# Patient Record
Sex: MALE | Race: WHITE | NOT HISPANIC OR LATINO | URBAN - METROPOLITAN AREA
[De-identification: names, ages, dates, MRNs, and addresses within clinical notes are randomized per-mention and may not be internally consistent; named-entity substitution may affect disease eponyms.]

---

## 2022-08-21 ENCOUNTER — INPATIENT HOSPITAL (OUTPATIENT)
Dept: URBAN - METROPOLITAN AREA HOSPITAL 56 | Facility: HOSPITAL | Age: 62
End: 2022-08-21

## 2022-08-21 DIAGNOSIS — K44.9 DIAPHRAGMATIC HERNIA WITHOUT OBSTRUCTION OR GANGRENE: ICD-10-CM

## 2022-08-21 DIAGNOSIS — E11.22 TYPE 2 DIABETES MELLITUS WITH DIABETIC CHRONIC KIDNEY DISEAS: ICD-10-CM

## 2022-08-21 DIAGNOSIS — K29.90 GASTRODUODENITIS, UNSPECIFIED, WITHOUT BLEEDING: ICD-10-CM

## 2022-08-21 DIAGNOSIS — K25.9 GASTRIC ULCER, UNSPECIFIED AS ACUTE OR CHRONIC, WITHOUT HEMO: ICD-10-CM

## 2022-08-21 DIAGNOSIS — N18.4 CHRONIC KIDNEY DISEASE, STAGE 4 (SEVERE): ICD-10-CM

## 2022-08-21 DIAGNOSIS — K92.2 GASTROINTESTINAL HEMORRHAGE, UNSPECIFIED: ICD-10-CM

## 2022-08-21 DIAGNOSIS — R71.0 PRECIPITOUS DROP IN HEMATOCRIT: ICD-10-CM

## 2022-08-21 PROCEDURE — 43235 EGD DIAGNOSTIC BRUSH WASH: CPT | Performed by: INTERNAL MEDICINE

## 2022-08-21 PROCEDURE — 99254 IP/OBS CNSLTJ NEW/EST MOD 60: CPT | Mod: 25

## 2022-08-22 ENCOUNTER — INPATIENT HOSPITAL (OUTPATIENT)
Dept: URBAN - METROPOLITAN AREA HOSPITAL 56 | Facility: HOSPITAL | Age: 62
End: 2022-08-22

## 2022-08-22 DIAGNOSIS — K25.9 GASTRIC ULCER, UNSPECIFIED AS ACUTE OR CHRONIC, WITHOUT HEMO: ICD-10-CM

## 2022-08-22 DIAGNOSIS — K29.90 GASTRODUODENITIS, UNSPECIFIED, WITHOUT BLEEDING: ICD-10-CM

## 2022-08-22 DIAGNOSIS — D53.8 OTHER SPECIFIED NUTRITIONAL ANEMIAS: ICD-10-CM

## 2022-08-22 DIAGNOSIS — K44.9 DIAPHRAGMATIC HERNIA WITHOUT OBSTRUCTION OR GANGRENE: ICD-10-CM

## 2022-08-22 PROCEDURE — 99232 SBSQ HOSP IP/OBS MODERATE 35: CPT

## 2022-08-23 PROCEDURE — 99232 SBSQ HOSP IP/OBS MODERATE 35: CPT

## 2023-04-15 ENCOUNTER — INPATIENT HOSPITAL (OUTPATIENT)
Dept: URBAN - METROPOLITAN AREA HOSPITAL 56 | Facility: HOSPITAL | Age: 63
End: 2023-04-15
Payer: COMMERCIAL

## 2023-04-15 DIAGNOSIS — K92.1 MELENA: ICD-10-CM

## 2023-04-15 DIAGNOSIS — D50.0 IRON DEFICIENCY ANEMIA SECONDARY TO BLOOD LOSS (CHRONIC): ICD-10-CM

## 2023-04-15 PROCEDURE — 99255 IP/OBS CONSLTJ NEW/EST HI 80: CPT

## 2023-04-16 ENCOUNTER — INPATIENT HOSPITAL (OUTPATIENT)
Dept: URBAN - METROPOLITAN AREA HOSPITAL 56 | Facility: HOSPITAL | Age: 63
End: 2023-04-16
Payer: COMMERCIAL

## 2023-04-16 DIAGNOSIS — K92.1 MELENA: ICD-10-CM

## 2023-04-16 DIAGNOSIS — K26.4 CHRONIC OR UNSPECIFIED DUODENAL ULCER WITH HEMORRHAGE: ICD-10-CM

## 2023-04-16 PROCEDURE — 43255 EGD CONTROL BLEEDING ANY: CPT | Performed by: INTERNAL MEDICINE

## 2023-04-17 ENCOUNTER — INPATIENT HOSPITAL (OUTPATIENT)
Dept: URBAN - METROPOLITAN AREA HOSPITAL 56 | Facility: HOSPITAL | Age: 63
End: 2023-04-17
Payer: COMMERCIAL

## 2023-04-17 DIAGNOSIS — D50.0 IRON DEFICIENCY ANEMIA SECONDARY TO BLOOD LOSS (CHRONIC): ICD-10-CM

## 2023-04-17 DIAGNOSIS — K26.4 CHRONIC OR UNSPECIFIED DUODENAL ULCER WITH HEMORRHAGE: ICD-10-CM

## 2023-04-17 PROCEDURE — 99233 SBSQ HOSP IP/OBS HIGH 50: CPT | Performed by: PHYSICIAN ASSISTANT

## 2023-04-18 PROCEDURE — 99232 SBSQ HOSP IP/OBS MODERATE 35: CPT | Performed by: PHYSICIAN ASSISTANT

## 2023-04-22 ENCOUNTER — INPATIENT HOSPITAL (OUTPATIENT)
Dept: URBAN - METROPOLITAN AREA HOSPITAL 56 | Facility: HOSPITAL | Age: 63
End: 2023-04-22
Payer: COMMERCIAL

## 2023-04-22 DIAGNOSIS — K92.1 MELENA: ICD-10-CM

## 2023-04-22 DIAGNOSIS — K26.4 CHRONIC OR UNSPECIFIED DUODENAL ULCER WITH HEMORRHAGE: ICD-10-CM

## 2023-04-22 PROCEDURE — 43255 EGD CONTROL BLEEDING ANY: CPT | Performed by: INTERNAL MEDICINE

## 2023-04-23 ENCOUNTER — INPATIENT HOSPITAL (OUTPATIENT)
Dept: URBAN - METROPOLITAN AREA HOSPITAL 56 | Facility: HOSPITAL | Age: 63
End: 2023-04-23
Payer: COMMERCIAL

## 2023-04-23 DIAGNOSIS — K26.4 CHRONIC OR UNSPECIFIED DUODENAL ULCER WITH HEMORRHAGE: ICD-10-CM

## 2023-04-23 DIAGNOSIS — D50.0 IRON DEFICIENCY ANEMIA SECONDARY TO BLOOD LOSS (CHRONIC): ICD-10-CM

## 2023-04-23 PROCEDURE — 99232 SBSQ HOSP IP/OBS MODERATE 35: CPT

## 2023-04-24 ENCOUNTER — INPATIENT HOSPITAL (OUTPATIENT)
Dept: URBAN - METROPOLITAN AREA HOSPITAL 104 | Facility: HOSPITAL | Age: 63
End: 2023-04-24
Payer: COMMERCIAL

## 2023-04-24 DIAGNOSIS — D50.0 IRON DEFICIENCY ANEMIA SECONDARY TO BLOOD LOSS (CHRONIC): ICD-10-CM

## 2023-04-24 DIAGNOSIS — K26.4 CHRONIC OR UNSPECIFIED DUODENAL ULCER WITH HEMORRHAGE: ICD-10-CM

## 2023-04-24 DIAGNOSIS — R57.1 HYPOVOLEMIC SHOCK: ICD-10-CM

## 2023-04-24 PROCEDURE — 99233 SBSQ HOSP IP/OBS HIGH 50: CPT | Performed by: NURSE PRACTITIONER

## 2023-04-25 PROCEDURE — 99232 SBSQ HOSP IP/OBS MODERATE 35: CPT | Performed by: NURSE PRACTITIONER

## 2023-04-26 PROCEDURE — 99232 SBSQ HOSP IP/OBS MODERATE 35: CPT | Performed by: NURSE PRACTITIONER

## 2024-06-01 ENCOUNTER — INPATIENT HOSPITAL (OUTPATIENT)
Dept: URBAN - METROPOLITAN AREA HOSPITAL 104 | Facility: HOSPITAL | Age: 64
End: 2024-06-01
Payer: COMMERCIAL

## 2024-06-01 DIAGNOSIS — D50.0 IRON DEFICIENCY ANEMIA SECONDARY TO BLOOD LOSS (CHRONIC): ICD-10-CM

## 2024-06-01 DIAGNOSIS — K92.1 MELENA: ICD-10-CM

## 2024-06-01 PROCEDURE — 99222 1ST HOSP IP/OBS MODERATE 55: CPT | Performed by: INTERNAL MEDICINE

## 2024-06-03 ENCOUNTER — INPATIENT HOSPITAL (OUTPATIENT)
Dept: URBAN - METROPOLITAN AREA HOSPITAL 104 | Facility: HOSPITAL | Age: 64
End: 2024-06-03
Payer: COMMERCIAL

## 2024-06-03 DIAGNOSIS — D50.0 IRON DEFICIENCY ANEMIA SECONDARY TO BLOOD LOSS (CHRONIC): ICD-10-CM

## 2024-06-03 DIAGNOSIS — K92.1 MELENA: ICD-10-CM

## 2024-06-03 PROCEDURE — 99232 SBSQ HOSP IP/OBS MODERATE 35: CPT | Performed by: NURSE PRACTITIONER

## 2024-06-26 ENCOUNTER — INPATIENT HOSPITAL (OUTPATIENT)
Dept: URBAN - METROPOLITAN AREA HOSPITAL 104 | Facility: HOSPITAL | Age: 64
End: 2024-06-26
Payer: COMMERCIAL

## 2024-06-26 DIAGNOSIS — K92.1 MELENA: ICD-10-CM

## 2024-06-26 DIAGNOSIS — D50.0 IRON DEFICIENCY ANEMIA SECONDARY TO BLOOD LOSS (CHRONIC): ICD-10-CM

## 2024-06-26 DIAGNOSIS — K31.811 ANGIODYSPLASIA OF STOMACH AND DUODENUM WITH BLEEDING: ICD-10-CM

## 2024-06-26 DIAGNOSIS — K29.70 GASTRITIS, UNSPECIFIED, WITHOUT BLEEDING: ICD-10-CM

## 2024-06-26 PROCEDURE — 99222 1ST HOSP IP/OBS MODERATE 55: CPT | Mod: 25 | Performed by: NURSE PRACTITIONER

## 2024-06-26 PROCEDURE — 43255 EGD CONTROL BLEEDING ANY: CPT | Performed by: INTERNAL MEDICINE

## 2024-06-27 PROCEDURE — 99232 SBSQ HOSP IP/OBS MODERATE 35: CPT | Performed by: NURSE PRACTITIONER

## 2024-06-28 ENCOUNTER — INPATIENT HOSPITAL (OUTPATIENT)
Dept: URBAN - METROPOLITAN AREA HOSPITAL 104 | Facility: HOSPITAL | Age: 64
End: 2024-06-28
Payer: COMMERCIAL

## 2024-06-28 DIAGNOSIS — K29.70 GASTRITIS, UNSPECIFIED, WITHOUT BLEEDING: ICD-10-CM

## 2024-06-28 DIAGNOSIS — K31.811 ANGIODYSPLASIA OF STOMACH AND DUODENUM WITH BLEEDING: ICD-10-CM

## 2024-06-28 DIAGNOSIS — D50.0 IRON DEFICIENCY ANEMIA SECONDARY TO BLOOD LOSS (CHRONIC): ICD-10-CM

## 2024-06-28 PROCEDURE — 99232 SBSQ HOSP IP/OBS MODERATE 35: CPT | Performed by: NURSE PRACTITIONER

## 2024-07-08 ENCOUNTER — INPATIENT HOSPITAL (OUTPATIENT)
Dept: URBAN - METROPOLITAN AREA HOSPITAL 104 | Facility: HOSPITAL | Age: 64
End: 2024-07-08
Payer: COMMERCIAL

## 2024-07-08 DIAGNOSIS — K56.7 ILEUS, UNSPECIFIED: ICD-10-CM

## 2024-07-08 DIAGNOSIS — D50.0 IRON DEFICIENCY ANEMIA SECONDARY TO BLOOD LOSS (CHRONIC): ICD-10-CM

## 2024-07-08 PROCEDURE — 99232 SBSQ HOSP IP/OBS MODERATE 35: CPT | Performed by: PHYSICIAN ASSISTANT

## 2024-07-09 PROCEDURE — 99232 SBSQ HOSP IP/OBS MODERATE 35: CPT | Performed by: PHYSICIAN ASSISTANT

## 2024-10-25 ENCOUNTER — HOSPITAL ENCOUNTER (INPATIENT)
Age: 64
LOS: 13 days | Discharge: NURSING, REHAB OR LONG-TERM CARE | End: 2024-11-08
Attending: STUDENT IN AN ORGANIZED HEALTH CARE EDUCATION/TRAINING PROGRAM | Admitting: STUDENT IN AN ORGANIZED HEALTH CARE EDUCATION/TRAINING PROGRAM
Payer: MEDICARE

## 2024-10-25 ENCOUNTER — APPOINTMENT (OUTPATIENT)
Dept: CT IMAGING | Age: 64
End: 2024-10-25
Payer: MEDICARE

## 2024-10-25 ENCOUNTER — APPOINTMENT (OUTPATIENT)
Dept: GENERAL RADIOLOGY | Age: 64
End: 2024-10-25
Attending: STUDENT IN AN ORGANIZED HEALTH CARE EDUCATION/TRAINING PROGRAM
Payer: MEDICARE

## 2024-10-25 DIAGNOSIS — N18.6 ESRD (END STAGE RENAL DISEASE) (HCC): ICD-10-CM

## 2024-10-25 DIAGNOSIS — J96.21 ACUTE ON CHRONIC HYPOXIC RESPIRATORY FAILURE: Primary | ICD-10-CM

## 2024-10-25 LAB
ALBUMIN SERPL-MCNC: 2.5 G/DL (ref 3.4–5)
ALBUMIN/GLOB SERPL: 0.6 {RATIO} (ref 1.1–2.2)
ALP SERPL-CCNC: 183 U/L (ref 40–129)
ALT SERPL-CCNC: 11 U/L (ref 10–40)
ANION GAP SERPL CALCULATED.3IONS-SCNC: 7 MMOL/L (ref 9–17)
ARTERIAL PATENCY WRIST A: ABNORMAL
AST SERPL-CCNC: 25 U/L (ref 15–37)
BASOPHILS # BLD: 0.03 K/UL
BASOPHILS NFR BLD: 1 % (ref 0–1)
BILIRUB SERPL-MCNC: 0.5 MG/DL (ref 0–1)
BNP SERPL-MCNC: ABNORMAL PG/ML (ref 0–125)
BODY TEMPERATURE: 37
BUN SERPL-MCNC: 13 MG/DL (ref 7–20)
CALCIUM SERPL-MCNC: 10.6 MG/DL (ref 8.3–10.6)
CHLORIDE SERPL-SCNC: 96 MMOL/L (ref 99–110)
CO2 SERPL-SCNC: 27 MMOL/L (ref 21–32)
COHGB MFR BLD: 0.3 % (ref 0.5–1.5)
CREAT SERPL-MCNC: 1.8 MG/DL (ref 0.8–1.3)
EOSINOPHIL # BLD: 0.2 K/UL
EOSINOPHILS RELATIVE PERCENT: 3 % (ref 0–3)
ERYTHROCYTE [DISTWIDTH] IN BLOOD BY AUTOMATED COUNT: 16.2 % (ref 11.7–14.9)
GFR, ESTIMATED: 37 ML/MIN/1.73M2
GLUCOSE SERPL-MCNC: 85 MG/DL (ref 74–99)
HCO3 VENOUS: 29.2 MMOL/L (ref 22–29)
HCT VFR BLD AUTO: 27.4 % (ref 42–52)
HGB BLD-MCNC: 8.3 G/DL (ref 13.5–18)
IMM GRANULOCYTES # BLD AUTO: 0.02 K/UL
IMM GRANULOCYTES NFR BLD: 0 %
LACTATE BLDV-SCNC: 0.8 MMOL/L (ref 0.4–2)
LYMPHOCYTES NFR BLD: 0.51 K/UL
LYMPHOCYTES RELATIVE PERCENT: 8 % (ref 24–44)
MCH RBC QN AUTO: 28.3 PG (ref 27–31)
MCHC RBC AUTO-ENTMCNC: 30.3 G/DL (ref 32–36)
MCV RBC AUTO: 93.5 FL (ref 78–100)
METHEMOGLOBIN: 0.4 % (ref 0.5–1.5)
MONOCYTES NFR BLD: 0.31 K/UL
MONOCYTES NFR BLD: 5 % (ref 0–4)
NEUTROPHILS NFR BLD: 83 % (ref 36–66)
NEUTS SEG NFR BLD: 5.11 K/UL
OXYHGB MFR BLD: 88.8 %
PCO2 VENOUS: 47.2 MM HG (ref 38–54)
PH VENOUS: 7.41 (ref 7.32–7.43)
PLATELET # BLD AUTO: 128 K/UL (ref 140–440)
PMV BLD AUTO: 8.3 FL (ref 7.5–11.1)
PO2 VENOUS: 57.6 MM HG (ref 23–48)
POSITIVE BASE EXCESS, VEN: 4 MMOL/L (ref 0–3)
POTASSIUM SERPL-SCNC: 4.3 MMOL/L (ref 3.5–5.1)
PROT SERPL-MCNC: 6.9 G/DL (ref 6.4–8.2)
RBC # BLD AUTO: 2.93 M/UL (ref 4.6–6.2)
SODIUM SERPL-SCNC: 131 MMOL/L (ref 136–145)
TROPONIN I SERPL HS-MCNC: 140 NG/L (ref 0–22)
WBC OTHER # BLD: 6.2 K/UL (ref 4–10.5)

## 2024-10-25 PROCEDURE — 82805 BLOOD GASES W/O2 SATURATION: CPT

## 2024-10-25 PROCEDURE — 99285 EMERGENCY DEPT VISIT HI MDM: CPT

## 2024-10-25 PROCEDURE — 83605 ASSAY OF LACTIC ACID: CPT

## 2024-10-25 PROCEDURE — 5A1955Z RESPIRATORY VENTILATION, GREATER THAN 96 CONSECUTIVE HOURS: ICD-10-PCS | Performed by: INTERNAL MEDICINE

## 2024-10-25 PROCEDURE — 2580000003 HC RX 258: Performed by: STUDENT IN AN ORGANIZED HEALTH CARE EDUCATION/TRAINING PROGRAM

## 2024-10-25 PROCEDURE — 83880 ASSAY OF NATRIURETIC PEPTIDE: CPT

## 2024-10-25 PROCEDURE — 84145 PROCALCITONIN (PCT): CPT

## 2024-10-25 PROCEDURE — 6370000000 HC RX 637 (ALT 250 FOR IP): Performed by: STUDENT IN AN ORGANIZED HEALTH CARE EDUCATION/TRAINING PROGRAM

## 2024-10-25 PROCEDURE — 6360000002 HC RX W HCPCS: Performed by: STUDENT IN AN ORGANIZED HEALTH CARE EDUCATION/TRAINING PROGRAM

## 2024-10-25 PROCEDURE — 93005 ELECTROCARDIOGRAM TRACING: CPT | Performed by: STUDENT IN AN ORGANIZED HEALTH CARE EDUCATION/TRAINING PROGRAM

## 2024-10-25 PROCEDURE — 96367 TX/PROPH/DG ADDL SEQ IV INF: CPT

## 2024-10-25 PROCEDURE — 80053 COMPREHEN METABOLIC PANEL: CPT

## 2024-10-25 PROCEDURE — 94640 AIRWAY INHALATION TREATMENT: CPT

## 2024-10-25 PROCEDURE — 2500000003 HC RX 250 WO HCPCS: Performed by: STUDENT IN AN ORGANIZED HEALTH CARE EDUCATION/TRAINING PROGRAM

## 2024-10-25 PROCEDURE — 87040 BLOOD CULTURE FOR BACTERIA: CPT

## 2024-10-25 PROCEDURE — 71045 X-RAY EXAM CHEST 1 VIEW: CPT

## 2024-10-25 PROCEDURE — 83690 ASSAY OF LIPASE: CPT

## 2024-10-25 PROCEDURE — 85025 COMPLETE CBC W/AUTO DIFF WBC: CPT

## 2024-10-25 PROCEDURE — 0202U NFCT DS 22 TRGT SARS-COV-2: CPT

## 2024-10-25 PROCEDURE — 5A1D70Z PERFORMANCE OF URINARY FILTRATION, INTERMITTENT, LESS THAN 6 HOURS PER DAY: ICD-10-PCS | Performed by: INTERNAL MEDICINE

## 2024-10-25 PROCEDURE — 96365 THER/PROPH/DIAG IV INF INIT: CPT

## 2024-10-25 PROCEDURE — 36415 COLL VENOUS BLD VENIPUNCTURE: CPT

## 2024-10-25 PROCEDURE — 84484 ASSAY OF TROPONIN QUANT: CPT

## 2024-10-25 RX ORDER — IPRATROPIUM BROMIDE AND ALBUTEROL SULFATE 2.5; .5 MG/3ML; MG/3ML
2 SOLUTION RESPIRATORY (INHALATION) ONCE
Status: COMPLETED | OUTPATIENT
Start: 2024-10-25 | End: 2024-10-25

## 2024-10-25 RX ADMIN — CEFEPIME 2000 MG: 2 INJECTION, POWDER, FOR SOLUTION INTRAVENOUS at 23:14

## 2024-10-25 RX ADMIN — DOXYCYCLINE 100 MG: 100 INJECTION, POWDER, LYOPHILIZED, FOR SOLUTION INTRAVENOUS at 23:50

## 2024-10-25 RX ADMIN — IPRATROPIUM BROMIDE AND ALBUTEROL SULFATE 2 DOSE: 2.5; .5 SOLUTION RESPIRATORY (INHALATION) at 23:20

## 2024-10-25 RX ADMIN — VANCOMYCIN HYDROCHLORIDE 2500 MG: 5 INJECTION, POWDER, LYOPHILIZED, FOR SOLUTION INTRAVENOUS at 23:49

## 2024-10-25 ASSESSMENT — PAIN - FUNCTIONAL ASSESSMENT: PAIN_FUNCTIONAL_ASSESSMENT: NONE - DENIES PAIN

## 2024-10-26 ENCOUNTER — APPOINTMENT (OUTPATIENT)
Dept: CT IMAGING | Age: 64
End: 2024-10-26
Payer: MEDICARE

## 2024-10-26 PROBLEM — J96.20 ACUTE ON CHRONIC RESPIRATORY FAILURE: Status: ACTIVE | Noted: 2024-10-26

## 2024-10-26 LAB
ALBUMIN SERPL-MCNC: 2.2 G/DL (ref 3.4–5)
ALBUMIN/GLOB SERPL: 0.5 {RATIO} (ref 1.1–2.2)
ALP SERPL-CCNC: 147 U/L (ref 40–129)
ALT SERPL-CCNC: 7 U/L (ref 10–40)
ANION GAP SERPL CALCULATED.3IONS-SCNC: 6 MMOL/L (ref 9–17)
AST SERPL-CCNC: 21 U/L (ref 15–37)
B PARAP IS1001 DNA NPH QL NAA+NON-PROBE: NOT DETECTED
B PERT DNA SPEC QL NAA+PROBE: NOT DETECTED
BILIRUB SERPL-MCNC: 0.4 MG/DL (ref 0–1)
BUN SERPL-MCNC: 17 MG/DL (ref 7–20)
C PNEUM DNA NPH QL NAA+NON-PROBE: NOT DETECTED
CALCIUM SERPL-MCNC: 10.4 MG/DL (ref 8.3–10.6)
CHLORIDE SERPL-SCNC: 99 MMOL/L (ref 99–110)
CO2 SERPL-SCNC: 27 MMOL/L (ref 21–32)
CREAT SERPL-MCNC: 2.1 MG/DL (ref 0.8–1.3)
EKG ATRIAL RATE: 94 BPM
EKG DIAGNOSIS: NORMAL
EKG P AXIS: 62 DEGREES
EKG P-R INTERVAL: 192 MS
EKG Q-T INTERVAL: 394 MS
EKG QRS DURATION: 144 MS
EKG QTC CALCULATION (BAZETT): 492 MS
EKG R AXIS: -78 DEGREES
EKG T AXIS: 44 DEGREES
EKG VENTRICULAR RATE: 94 BPM
ERYTHROCYTE [DISTWIDTH] IN BLOOD BY AUTOMATED COUNT: 16.5 % (ref 11.7–14.9)
FLUAV RNA NPH QL NAA+NON-PROBE: NOT DETECTED
FLUBV RNA NPH QL NAA+NON-PROBE: NOT DETECTED
GFR, ESTIMATED: 32 ML/MIN/1.73M2
GLUCOSE SERPL-MCNC: 54 MG/DL (ref 74–99)
HADV DNA NPH QL NAA+NON-PROBE: NOT DETECTED
HCOV 229E RNA NPH QL NAA+NON-PROBE: NOT DETECTED
HCOV HKU1 RNA NPH QL NAA+NON-PROBE: NOT DETECTED
HCOV NL63 RNA NPH QL NAA+NON-PROBE: NOT DETECTED
HCOV OC43 RNA NPH QL NAA+NON-PROBE: NOT DETECTED
HCT VFR BLD AUTO: 23.8 % (ref 42–52)
HGB BLD-MCNC: 7.1 G/DL (ref 13.5–18)
HMPV RNA NPH QL NAA+NON-PROBE: NOT DETECTED
HPIV1 RNA NPH QL NAA+NON-PROBE: NOT DETECTED
HPIV2 RNA NPH QL NAA+NON-PROBE: NOT DETECTED
HPIV3 RNA NPH QL NAA+NON-PROBE: NOT DETECTED
HPIV4 RNA NPH QL NAA+NON-PROBE: NOT DETECTED
LIPASE SERPL-CCNC: 15 U/L (ref 13–60)
M PNEUMO DNA NPH QL NAA+NON-PROBE: NOT DETECTED
MCH RBC QN AUTO: 28 PG (ref 27–31)
MCHC RBC AUTO-ENTMCNC: 29.8 G/DL (ref 32–36)
MCV RBC AUTO: 93.7 FL (ref 78–100)
MRSA, DNA, NASAL: NOT DETECTED
PLATELET # BLD AUTO: 127 K/UL (ref 140–440)
PMV BLD AUTO: 8.8 FL (ref 7.5–11.1)
POTASSIUM SERPL-SCNC: 4.4 MMOL/L (ref 3.5–5.1)
PROCALCITONIN SERPL-MCNC: 2 NG/ML
PROT SERPL-MCNC: 6.4 G/DL (ref 6.4–8.2)
RBC # BLD AUTO: 2.54 M/UL (ref 4.6–6.2)
RSV RNA NPH QL NAA+NON-PROBE: NOT DETECTED
RV+EV RNA NPH QL NAA+NON-PROBE: NOT DETECTED
SARS-COV-2 RNA NPH QL NAA+NON-PROBE: NOT DETECTED
SODIUM SERPL-SCNC: 132 MMOL/L (ref 136–145)
SPECIMEN DESCRIPTION: NORMAL
SPECIMEN DESCRIPTION: NORMAL
TROPONIN I SERPL HS-MCNC: 150 NG/L (ref 0–22)
WBC OTHER # BLD: 4.9 K/UL (ref 4–10.5)

## 2024-10-26 PROCEDURE — 87205 SMEAR GRAM STAIN: CPT

## 2024-10-26 PROCEDURE — 96368 THER/DIAG CONCURRENT INF: CPT

## 2024-10-26 PROCEDURE — 6370000000 HC RX 637 (ALT 250 FOR IP): Performed by: STUDENT IN AN ORGANIZED HEALTH CARE EDUCATION/TRAINING PROGRAM

## 2024-10-26 PROCEDURE — 94640 AIRWAY INHALATION TREATMENT: CPT

## 2024-10-26 PROCEDURE — 6360000002 HC RX W HCPCS: Performed by: STUDENT IN AN ORGANIZED HEALTH CARE EDUCATION/TRAINING PROGRAM

## 2024-10-26 PROCEDURE — 87070 CULTURE OTHR SPECIMN AEROBIC: CPT

## 2024-10-26 PROCEDURE — 89220 SPUTUM SPECIMEN COLLECTION: CPT

## 2024-10-26 PROCEDURE — 94002 VENT MGMT INPAT INIT DAY: CPT

## 2024-10-26 PROCEDURE — 74176 CT ABD & PELVIS W/O CONTRAST: CPT

## 2024-10-26 PROCEDURE — 85027 COMPLETE CBC AUTOMATED: CPT

## 2024-10-26 PROCEDURE — 87077 CULTURE AEROBIC IDENTIFY: CPT

## 2024-10-26 PROCEDURE — 87641 MR-STAPH DNA AMP PROBE: CPT

## 2024-10-26 PROCEDURE — 80053 COMPREHEN METABOLIC PANEL: CPT

## 2024-10-26 PROCEDURE — 94761 N-INVAS EAR/PLS OXIMETRY MLT: CPT

## 2024-10-26 PROCEDURE — 2580000003 HC RX 258: Performed by: STUDENT IN AN ORGANIZED HEALTH CARE EDUCATION/TRAINING PROGRAM

## 2024-10-26 PROCEDURE — 2000000000 HC ICU R&B

## 2024-10-26 PROCEDURE — 2700000000 HC OXYGEN THERAPY PER DAY

## 2024-10-26 PROCEDURE — 93010 ELECTROCARDIOGRAM REPORT: CPT | Performed by: INTERNAL MEDICINE

## 2024-10-26 PROCEDURE — 87186 SC STD MICRODIL/AGAR DIL: CPT

## 2024-10-26 PROCEDURE — 96366 THER/PROPH/DIAG IV INF ADDON: CPT

## 2024-10-26 PROCEDURE — 84484 ASSAY OF TROPONIN QUANT: CPT

## 2024-10-26 RX ORDER — SUCRALFATE ORAL 1 G/10ML
1 SUSPENSION ORAL 3 TIMES DAILY
COMMUNITY

## 2024-10-26 RX ORDER — ONDANSETRON 8 MG/1
8 TABLET, ORALLY DISINTEGRATING ORAL EVERY 8 HOURS PRN
COMMUNITY

## 2024-10-26 RX ORDER — ACETAMINOPHEN 500 MG
1000 TABLET ORAL EVERY 8 HOURS PRN
COMMUNITY
Start: 2024-08-06

## 2024-10-26 RX ORDER — ATORVASTATIN CALCIUM 40 MG/1
40 TABLET, FILM COATED ORAL NIGHTLY
Status: DISCONTINUED | OUTPATIENT
Start: 2024-10-26 | End: 2024-11-08 | Stop reason: HOSPADM

## 2024-10-26 RX ORDER — LOPERAMIDE HYDROCHLORIDE 2 MG/1
2 CAPSULE ORAL PRN
COMMUNITY
Start: 2024-08-06

## 2024-10-26 RX ORDER — TRAZODONE HYDROCHLORIDE 50 MG/1
50 TABLET, FILM COATED ORAL NIGHTLY
Status: DISCONTINUED | OUTPATIENT
Start: 2024-10-26 | End: 2024-11-08 | Stop reason: HOSPADM

## 2024-10-26 RX ORDER — LANOLIN ALCOHOL/MO/W.PET/CERES
1 CREAM (GRAM) TOPICAL NIGHTLY PRN
COMMUNITY
Start: 2024-06-24

## 2024-10-26 RX ORDER — SODIUM CHLORIDE FOR INHALATION 3 %
4 VIAL, NEBULIZER (ML) INHALATION PRN
Status: DISCONTINUED | OUTPATIENT
Start: 2024-10-26 | End: 2024-11-08 | Stop reason: HOSPADM

## 2024-10-26 RX ORDER — HEPARIN SODIUM 5000 [USP'U]/ML
5000 INJECTION, SOLUTION INTRAVENOUS; SUBCUTANEOUS EVERY 8 HOURS SCHEDULED
Status: DISCONTINUED | OUTPATIENT
Start: 2024-10-26 | End: 2024-11-08 | Stop reason: HOSPADM

## 2024-10-26 RX ORDER — GABAPENTIN 100 MG/1
100 CAPSULE ORAL DAILY
COMMUNITY

## 2024-10-26 RX ORDER — TRAZODONE HYDROCHLORIDE 50 MG/1
1 TABLET, FILM COATED ORAL NIGHTLY
COMMUNITY
Start: 2024-06-24

## 2024-10-26 RX ORDER — MIDODRINE HYDROCHLORIDE 5 MG/1
10 TABLET ORAL PRN
Status: DISCONTINUED | OUTPATIENT
Start: 2024-10-26 | End: 2024-10-26

## 2024-10-26 RX ORDER — SODIUM CHLORIDE FOR INHALATION 7 %
4 VIAL, NEBULIZER (ML) INHALATION EVERY 8 HOURS
COMMUNITY
Start: 2024-08-06

## 2024-10-26 RX ORDER — IPRATROPIUM BROMIDE AND ALBUTEROL SULFATE 2.5; .5 MG/3ML; MG/3ML
1 SOLUTION RESPIRATORY (INHALATION) EVERY 4 HOURS PRN
Status: DISCONTINUED | OUTPATIENT
Start: 2024-10-26 | End: 2024-11-08 | Stop reason: HOSPADM

## 2024-10-26 RX ORDER — BUMETANIDE 0.25 MG/ML
1 INJECTION, SOLUTION INTRAMUSCULAR; INTRAVENOUS ONCE
Status: COMPLETED | OUTPATIENT
Start: 2024-10-26 | End: 2024-10-26

## 2024-10-26 RX ORDER — ALBUTEROL SULFATE 0.83 MG/ML
2.5 SOLUTION RESPIRATORY (INHALATION) EVERY 4 HOURS
Status: DISCONTINUED | OUTPATIENT
Start: 2024-10-26 | End: 2024-11-08 | Stop reason: HOSPADM

## 2024-10-26 RX ORDER — ATORVASTATIN CALCIUM 40 MG/1
40 TABLET, FILM COATED ORAL NIGHTLY
COMMUNITY
Start: 2024-05-20

## 2024-10-26 RX ORDER — LANSOPRAZOLE 30 MG/1
30 TABLET, ORALLY DISINTEGRATING, DELAYED RELEASE ORAL
Status: DISCONTINUED | OUTPATIENT
Start: 2024-10-26 | End: 2024-11-08 | Stop reason: HOSPADM

## 2024-10-26 RX ORDER — MIDODRINE HYDROCHLORIDE 5 MG/1
10 TABLET ORAL
Status: DISCONTINUED | OUTPATIENT
Start: 2024-10-26 | End: 2024-11-08 | Stop reason: HOSPADM

## 2024-10-26 RX ORDER — LANSOPRAZOLE 30 MG/1
30 TABLET, ORALLY DISINTEGRATING, DELAYED RELEASE ORAL DAILY
COMMUNITY
Start: 2024-08-07

## 2024-10-26 RX ORDER — ALPRAZOLAM 0.5 MG
0.5 TABLET ORAL EVERY 6 HOURS PRN
COMMUNITY

## 2024-10-26 RX ORDER — ALBUTEROL SULFATE 0.83 MG/ML
2.5 SOLUTION RESPIRATORY (INHALATION) EVERY 4 HOURS
COMMUNITY
Start: 2024-08-06

## 2024-10-26 RX ORDER — OXYCODONE HYDROCHLORIDE 5 MG/1
5 TABLET ORAL EVERY 8 HOURS PRN
COMMUNITY
Start: 2024-08-06

## 2024-10-26 RX ORDER — ALPRAZOLAM 0.5 MG
0.5 TABLET ORAL EVERY 6 HOURS PRN
Status: DISCONTINUED | OUTPATIENT
Start: 2024-10-26 | End: 2024-11-08 | Stop reason: HOSPADM

## 2024-10-26 RX ORDER — MIDODRINE HYDROCHLORIDE 5 MG/1
10 TABLET ORAL
Status: DISCONTINUED | OUTPATIENT
Start: 2024-10-26 | End: 2024-10-26

## 2024-10-26 RX ORDER — GABAPENTIN 100 MG/1
100 CAPSULE ORAL DAILY
Status: DISCONTINUED | OUTPATIENT
Start: 2024-10-26 | End: 2024-11-08 | Stop reason: HOSPADM

## 2024-10-26 RX ORDER — OXYCODONE HYDROCHLORIDE 5 MG/1
5 TABLET ORAL EVERY 6 HOURS PRN
Status: DISCONTINUED | OUTPATIENT
Start: 2024-10-26 | End: 2024-11-08 | Stop reason: HOSPADM

## 2024-10-26 RX ORDER — MIDODRINE HYDROCHLORIDE 10 MG/1
1 TABLET ORAL PRN
COMMUNITY
Start: 2024-06-24

## 2024-10-26 RX ORDER — ALBUTEROL SULFATE 0.83 MG/ML
2.5 SOLUTION RESPIRATORY (INHALATION) EVERY 4 HOURS
Status: DISCONTINUED | OUTPATIENT
Start: 2024-10-26 | End: 2024-10-26

## 2024-10-26 RX ADMIN — ALPRAZOLAM 0.5 MG: 0.5 TABLET ORAL at 14:57

## 2024-10-26 RX ADMIN — LANSOPRAZOLE 30 MG: 30 TABLET, ORALLY DISINTEGRATING ORAL at 07:54

## 2024-10-26 RX ADMIN — ALPRAZOLAM 0.5 MG: 0.5 TABLET ORAL at 21:34

## 2024-10-26 RX ADMIN — HEPARIN SODIUM 5000 UNITS: 5000 INJECTION INTRAVENOUS; SUBCUTANEOUS at 08:03

## 2024-10-26 RX ADMIN — SODIUM CHLORIDE 1250 MG: 9 INJECTION, SOLUTION INTRAVENOUS at 21:54

## 2024-10-26 RX ADMIN — ALBUTEROL SULFATE 2.5 MG: 2.5 SOLUTION RESPIRATORY (INHALATION) at 12:13

## 2024-10-26 RX ADMIN — MEROPENEM 1000 MG: 1 INJECTION, POWDER, FOR SOLUTION INTRAVENOUS at 08:06

## 2024-10-26 RX ADMIN — ALPRAZOLAM 0.5 MG: 0.5 TABLET ORAL at 07:54

## 2024-10-26 RX ADMIN — MIDODRINE HYDROCHLORIDE 10 MG: 5 TABLET ORAL at 17:13

## 2024-10-26 RX ADMIN — ALBUTEROL SULFATE 2.5 MG: 2.5 SOLUTION RESPIRATORY (INHALATION) at 21:55

## 2024-10-26 RX ADMIN — MIDODRINE HYDROCHLORIDE 10 MG: 5 TABLET ORAL at 12:34

## 2024-10-26 RX ADMIN — ALBUTEROL SULFATE 2.5 MG: 2.5 SOLUTION RESPIRATORY (INHALATION) at 08:15

## 2024-10-26 RX ADMIN — HEPARIN SODIUM 5000 UNITS: 5000 INJECTION INTRAVENOUS; SUBCUTANEOUS at 14:39

## 2024-10-26 RX ADMIN — ALBUTEROL SULFATE 2.5 MG: 2.5 SOLUTION RESPIRATORY (INHALATION) at 16:19

## 2024-10-26 RX ADMIN — OXYCODONE HYDROCHLORIDE 5 MG: 5 TABLET ORAL at 23:42

## 2024-10-26 RX ADMIN — MEROPENEM 1000 MG: 1 INJECTION, POWDER, FOR SOLUTION INTRAVENOUS at 14:39

## 2024-10-26 RX ADMIN — BUMETANIDE 1 MG: 0.25 INJECTION INTRAMUSCULAR; INTRAVENOUS at 07:54

## 2024-10-26 RX ADMIN — ATORVASTATIN CALCIUM 40 MG: 40 TABLET, FILM COATED ORAL at 21:33

## 2024-10-26 RX ADMIN — OXYCODONE HYDROCHLORIDE 5 MG: 5 TABLET ORAL at 10:14

## 2024-10-26 RX ADMIN — OXYCODONE HYDROCHLORIDE 5 MG: 5 TABLET ORAL at 17:18

## 2024-10-26 RX ADMIN — TRAZODONE HYDROCHLORIDE 50 MG: 50 TABLET ORAL at 21:33

## 2024-10-26 RX ADMIN — GABAPENTIN 100 MG: 100 CAPSULE ORAL at 07:54

## 2024-10-26 RX ADMIN — MIDODRINE HYDROCHLORIDE 10 MG: 5 TABLET ORAL at 07:54

## 2024-10-26 RX ADMIN — HEPARIN SODIUM 5000 UNITS: 5000 INJECTION INTRAVENOUS; SUBCUTANEOUS at 21:33

## 2024-10-26 ASSESSMENT — PULMONARY FUNCTION TESTS
PIF_VALUE: 19
PIF_VALUE: 24
PIF_VALUE: 22
PIF_VALUE: 19
PIF_VALUE: 17
PIF_VALUE: 21
PIF_VALUE: 19
PIF_VALUE: 21
PIF_VALUE: 19
PIF_VALUE: 24
PIF_VALUE: 21
PIF_VALUE: 22
PIF_VALUE: 24
PIF_VALUE: 22
PIF_VALUE: 19
PIF_VALUE: 21
PIF_VALUE: 17
PIF_VALUE: 20
PIF_VALUE: 27
PIF_VALUE: 22

## 2024-10-26 ASSESSMENT — PAIN SCALES - GENERAL
PAINLEVEL_OUTOF10: 9
PAINLEVEL_OUTOF10: 8
PAINLEVEL_OUTOF10: 9

## 2024-10-26 ASSESSMENT — PAIN DESCRIPTION - DESCRIPTORS: DESCRIPTORS: ACHING

## 2024-10-26 ASSESSMENT — PAIN DESCRIPTION - ORIENTATION: ORIENTATION: MID

## 2024-10-26 ASSESSMENT — PAIN DESCRIPTION - LOCATION: LOCATION: BACK

## 2024-10-26 NOTE — PROGRESS NOTES
Pt esrd on hd at [facility] seeing dr martino. Sees [facility] outpt  Will forward consult and last hemodialysis Friday there with hd cath  Trach vent

## 2024-10-26 NOTE — H&P
History and Physical      Name:  Tico Day /Age/Sex: 1960  (64 y.o. male)   MRN & CSN:  [id number] & [id number] Encounter Date/Time: 10/26/2024 2:20 AM EDT   Location:  -A PCP: Carlos Nina MD       Assessment and Plan:   Tico Day is a 64 y.o. male with ESRD on HD , chronic systolic heart failure, chronic respiratory failure with tracheostomy since , left atrial thrombus, wide-complex tachycardia, CAD with history of PCI in , COPD, anemia of chronic disease, obstructive sleep apnea, anxiety, chronic pain with opioid dependence presented from nursing facility with respiratory distress    Tracheostomy dependence  Acute on chronic respiratory failure with hypoxia requiring ventilatory support  Secondary to acute on chronic systolic heart failure, possible pneumonia  Chest x-ray personally reviewed bilateral pulmonary infiltrates R>L and cardiomegaly Pro-BNP 43,703.  Last echocardiogram from 2024 reviewed EF of 40%.  Negative respiratory viral panel  IV Bumex 1 mg, monitor intake and output measure daily weight  Nephrology consultation for inpatient management of HD and volume management  IV meropenem and IV vancomycin, follow-up MRSA screen tracheal aspirate cultures and Bcx  Titrate PEEP and FiO2 to target saturation above 92%, tracheostomy care per RT    ESRD on HD   Nephrology consultation for inpatient management of hemodialysis    Elevated troponin secondary to demand ischemia  Coronary artery disease with history of PCI in   EKG 94/min right bundle branch block normal sinus rhythm  Historically unable to tolerate antiplatelet therapy due to recurrent GI bleed  Continue home Lipitor    Anemia of chronic disease  Stable Hb trend, no evidence of active bleeding  Monitor CBC    Obesity/BMI 31.6    Inpatient stepdown telemetry  Full code for now, tried calling Alesia, no response left VM    Disposition:     Current

## 2024-10-26 NOTE — CONSULTS
[address] [place] [address], [address] [place] [address]  Phone: [phone]  Office Hours: 8:30AM - 4:30PM  Monday - Friday     Nephrology Service Consultation    Patient:  Tico Day  MRN: [id number]  Consulting physician:  Emily Ramirez MD  Reason for Consult: ESRD on HD     History Obtained From:  patient, electronic medical record  PCP: Carlos Nina MD    Assessment and Recommendations     Patient Active Problem List   Diagnosis Code    Acute on chronic respiratory failure J96.20       Renal Function Monitoring stable  Dialysis Status on HD MWF - had dialysis Friday  Blood Pressure Management improved  Volume Status euvolemic no evidence of overload  Electrolytes Na 131 K normal  Acid/Base stable  Mineral/Bone Disease Management see orders  Will coordinate dialysis care with you    Poncho Hill MD [facility]    -----------------------------------------------------------------    HISTORY OF PRESENT ILLNESS:   The patient is a 64 y.o. male who presents with resp distress  He has been in [facility] getting rehab - he has been on HD - under the care of Nephrology assoneyda Josef  Has a trach  He had a prolonged  hospitalization  Dr Hoskins sees him in [facility]  See the  for further details    Past Medical History:    History reviewed. No pertinent past medical history.    Past Surgical History:    History reviewed. No pertinent surgical history.    Medications:   Scheduled Meds:   albuterol  2.5 mg Nebulization Q4H    atorvastatin  40 mg Oral Nightly    gabapentin  100 mg Per G Tube Daily    lansoprazole  30 mg Oral QAM AC    traZODone  50 mg Oral Nightly    meropenem  1,000 mg IntraVENous Q8H    midodrine  10 mg Oral TID WC    heparin (porcine)  5,000 Units SubCUTAneous 3 times per day    vancomycin  1,250 mg IntraVENous Q24H     Continuous Infusions:  PRN Meds:.ipratropium 0.5 mg-albuterol 2.5 mg, ALPRAZolam, oxyCODONE, sodium chloride (Inhalant)    Allergies:  Reglan [metoclopramide] and Remeron

## 2024-10-26 NOTE — PROGRESS NOTES
4 Eyes Skin Assessment     NAME:  Tico Day  YOB: 1960  MEDICAL RECORD NUMBER:  [id number]    The patient is being assessed for  Admission    I agree that at least one RN has performed a thorough Head to Toe Skin Assessment on the patient. ALL assessment sites listed below have been assessed.      Areas assessed by both nurses:    Head, Face, Ears, Shoulders, Back, Chest, Arms, Elbows, Hands, Sacrum. Buttock, Coccyx, Ischium, Legs. Feet and Heels, and Under Medical Devices         Does the Patient have a Wound? Yes wound(s) were present on assessment. LDA wound assessment was Initiated and completed by RN       Wilfrid Prevention initiated by RN: Yes  Wound Care Orders initiated by RN: Yes    Pressure Injury (Stage 3,4, Unstageable, DTI, NWPT, and Complex wounds) if present, place Wound referral order by RN under : No    New Ostomies, if present place, Ostomy referral order under : No     Nurse 1 eSignature: Electronically signed by Johanne Mehta RN on 10/26/24 at 5:35 AM EDT    **SHARE this note so that the co-signing nurse can place an eSignature**    Nurse 2 eSignature: Electronically signed by Alex Leonard RN on 10/26/24 at 5:37 AM EDT

## 2024-10-26 NOTE — ED PROVIDER NOTES
150.  Respiratory panel negative. [CR]   0144 CT:IMPRESSION:  Extremely limited evaluation due to extensive artifact from poor arm positioning  and right upper quadrant clips. If suspicion for gallbladder pathology, right  upper quadrant ultrasound is recommended.     Hepatic cirrhosis with sequelae including moderate ascites and splenomegaly.     Diverticulosis.     Small bilateral pleural effusions with adjacent consolidation/atelectasis. Right  lung base subcentimeter nodular opacities, incompletely visualized. Consider  short-term follow-up dedicated CT of the chest.     Cardiomegaly.     Umbilical fat-containing hernia.   [CR]   0147 Paged Lulu Marin MD for admisison [CR]      ED Course User Index  [CR] Ajay Harrison MD           Independent Imaging Interpretation by me: please seen ED course/above/below    EKG (if obtained): (All EKG's are interpreted by myself in the absence of a cardiologist) Please see ED course/above/below    Is this patient to be included in the SEP-1 Core Measure due to severe sepsis or septic shock?   No   Exclusion criteria - the patient is NOT to be included for SEP-1 Core Measure due to:  May have criteria for sepsis, but does not meet criteria for severe sepsis or septic shock    Patient was given the following medications:  Medications   ipratropium 0.5 mg-albuterol 2.5 mg (DUONEB) nebulizer solution 1 Dose (has no administration in time range)   ceFEPIme (MAXIPIME) 2,000 mg in sodium chloride 0.9 % 50 mL IVPB (mini-bag) (has no administration in time range)   vancomycin (VANCOCIN) 2,500 mg in sodium chloride 0.9 % 500 mL IVPB (0 mg IntraVENous Stopped 10/26/24 0249)   ceFEPIme (MAXIPIME) 2,000 mg in sodium chloride 0.9 % 50 mL IVPB (mini-bag) (0 mg IntraVENous Stopped 10/25/24 2345)   doxycycline (VIBRAMYCIN) 100 mg in sodium chloride 0.9 % 100 mL IVPB (mini-bag) (0 mg IntraVENous Stopped 10/26/24 0050)   ipratropium 0.5 mg-albuterol 2.5 mg (DUONEB) nebulizer    ceFEPIme (MAXIPIME) 2,000 mg in sodium chloride 0.9 % 50 mL IVPB (mini-bag) (0 mg IntraVENous Stopped 10/25/24 2345)   doxycycline (VIBRAMYCIN) 100 mg in sodium chloride 0.9 % 100 mL IVPB (mini-bag) (0 mg IntraVENous Stopped 10/26/24 0050)   ipratropium 0.5 mg-albuterol 2.5 mg (DUONEB) nebulizer solution 2 Dose (2 Doses Inhalation Given 10/25/24 2320)       DISCHARGE PRESCRIPTIONS: (None if blank)  There are no discharge medications for this patient.      I have reviewed and interpreted all of the currently available lab results from this visit (if applicable):    Radiographs (if obtained):  Radiologist's Report Reviewed:  CT ABDOMEN PELVIS WO CONTRAST Additional Contrast? None   Final Result   Extremely limited evaluation due to extensive artifact from poor arm positioning   and right upper quadrant clips. If suspicion for gallbladder pathology, right   upper quadrant ultrasound is recommended.      Hepatic cirrhosis with sequelae including moderate ascites and splenomegaly.      Diverticulosis.      Small bilateral pleural effusions with adjacent consolidation/atelectasis. Right   lung base subcentimeter nodular opacities, incompletely visualized. Consider   short-term follow-up dedicated CT of the chest.      Cardiomegaly.      Umbilical fat-containing hernia.      Electronically signed by FAVIAN AGUDELO      XR CHEST PORTABLE   Final Result   1. Moderate diffuse bilateral infiltrate versus edema.   2. Dialysis catheter and tracheostomy tube in good position.   3. Small right pleural effusion.            Electronically signed by Dionicio Nj MD          LABS: (none if blank)  Labs Reviewed   CBC WITH AUTO DIFFERENTIAL - Abnormal; Notable for the following components:       Result Value    RBC 2.93 (*)     Hemoglobin 8.3 (*)     Hematocrit 27.4 (*)     MCHC 30.3 (*)     RDW 16.2 (*)     Platelets 128 (*)     Neutrophils % 83 (*)     Lymphocytes % 8 (*)     Monocytes % 5 (*)     All other components within normal

## 2024-10-26 NOTE — ED NOTES
The following labs were labeled with appropriate pt sticker and tubed to lab:     [] Blue     [] Lavender   [] on ice  [x] Green/yellow  [] Green/black [] on ice  [x] Grey  [x] on ice  [] Yellow  [] Red  [] Pink  [] Type/ Screen  [] ABG  [] VBG    [] COVID-19 swab    [] Rapid  [] PCR  [] Flu swab  [] Peds Viral Panel     [] Urine Sample  [] Fecal Sample  [] Pelvic Cultures  [] Blood Cultures  [] X 2  [] STREP Cultures  [] Wound Cultures

## 2024-10-26 NOTE — ED NOTES
ED TO INPATIENT SBAR HANDOFF    Patient Name: Tico Day   :  1960  64 y.o.   Preferred Name  Tico  Family/Caregiver Present no   Restraints no   C-SSRS: Risk of Suicide: No Risk  Sitter no   Sepsis Risk Score        Situation  Chief Complaint   Patient presents with    Respiratory Distress     Brief Description of Patient's Condition: trach on vent  Mental Status: oriented and alert  Arrived from: nursing home    Imaging:   CT ABDOMEN PELVIS WO CONTRAST Additional Contrast? None   Final Result   Extremely limited evaluation due to extensive artifact from poor arm positioning   and right upper quadrant clips. If suspicion for gallbladder pathology, right   upper quadrant ultrasound is recommended.      Hepatic cirrhosis with sequelae including moderate ascites and splenomegaly.      Diverticulosis.      Small bilateral pleural effusions with adjacent consolidation/atelectasis. Right   lung base subcentimeter nodular opacities, incompletely visualized. Consider   short-term follow-up dedicated CT of the chest.      Cardiomegaly.      Umbilical fat-containing hernia.      Electronically signed by FAVIAN AGUDELO      XR CHEST PORTABLE   Final Result   1. Moderate diffuse bilateral infiltrate versus edema.   2. Dialysis catheter and tracheostomy tube in good position.   3. Small right pleural effusion.            Electronically signed by Dionicio Nj MD        Abnormal labs:   Abnormal Labs Reviewed   CBC WITH AUTO DIFFERENTIAL - Abnormal; Notable for the following components:       Result Value    RBC 2.93 (*)     Hemoglobin 8.3 (*)     Hematocrit 27.4 (*)     MCHC 30.3 (*)     RDW 16.2 (*)     Platelets 128 (*)     Neutrophils % 83 (*)     Lymphocytes % 8 (*)     Monocytes % 5 (*)     All other components within normal limits   COMPREHENSIVE METABOLIC PANEL - Abnormal; Notable for the following components:    Sodium 131 (*)     Chloride 96 (*)     Anion Gap 7 (*)     Creatinine 1.8 (*)     Est, Glom

## 2024-10-26 NOTE — CONSULTS
PHARMACY VANCOMYCIN MONITORING SERVICE  Pharmacy consulted by Dr. Garrison for monitoring and adjustment.    Indication for treatment: Vancomycin indication: HAP  Goal trough: Trough Goal: 15-20 mcg/mL  AUC/EULALIO: 400-600    Risk Factors for MRSA Identified:   Hospitalization within the past 90 days    Pertinent Laboratory Values:   Temp Readings from Last 3 Encounters:   10/25/24 98.9 °F (37.2 °C) (Oral)     Recent Labs     10/25/24  2245   WBC 6.2     Recent Labs     10/25/24  2245   BUN 13   CREATININE 1.8*     Estimated Creatinine Clearance: 48 mL/min (A) (based on SCr of 1.8 mg/dL (H)).    Intake/Output Summary (Last 24 hours) at 10/26/2024 1024  Last data filed at 10/26/2024 0249  Gross per 24 hour   Intake 650 ml   Output --   Net 650 ml     Last Encounter Weight:  Wt Readings from Last 3 Encounters:   10/26/24 97.3 kg (214 lb 8.1 oz)       Pertinent Cultures:   Date    Source    Results  10/25   Blood    NGTD  10/26   Sputum/Respiratory  To be collected  10/26   MRSA Nasal   To be collected   10/25   Respiratory panel   Negative     Vancomycin level:   TROUGH:  No results for input(s): \"VANCOTROUGH\" in the last 72 hours.  RANDOM:  No results for input(s): \"VANCORANDOM\" in the last 72 hours.    Assessment:  HPI: patient is a 64 y.o. male with tracheostomy dependence who presents with acute on chronic respiratory failure with hypoxia requiring ventilatory support and possible pneumonia. History of ESRD on iHD MWF, CAD with PCI 2022, anemia.  SCr, BUN, and urine output: Scr 1.8, BUN 13, UoP not documented  ESRD on iHD MWF -no orders yet, nephro consult placed  Day(s) of therapy: 1 of 7  Vancomycin concentration: TBD    Plan:  Loading dose of vancomycin 2500 mg IV x once administered in ED   Will give maintenance dose of vancomycin 1250 mg IV q24h  Regimen predicts ssAUC of 539 mg/L*hr  Pharmacy will continue to monitor patient and adjust therapy as indicated    VANCOMYCIN CONCENTRATION SCHEDULED FOR 10/29 @

## 2024-10-27 PROBLEM — E44.0 MODERATE MALNUTRITION (HCC): Status: ACTIVE | Noted: 2024-10-27

## 2024-10-27 LAB
ALBUMIN SERPL-MCNC: 2.3 G/DL (ref 3.4–5)
ALBUMIN/GLOB SERPL: 0.5 {RATIO} (ref 1.1–2.2)
ALP SERPL-CCNC: 155 U/L (ref 40–129)
ALT SERPL-CCNC: 6 U/L (ref 10–40)
ANION GAP SERPL CALCULATED.3IONS-SCNC: 8 MMOL/L (ref 9–17)
AST SERPL-CCNC: 20 U/L (ref 15–37)
BASOPHILS # BLD: 0.04 K/UL
BASOPHILS NFR BLD: 0 % (ref 0–1)
BILIRUB SERPL-MCNC: 0.5 MG/DL (ref 0–1)
BUN SERPL-MCNC: 22 MG/DL (ref 7–20)
CALCIUM SERPL-MCNC: 11.4 MG/DL (ref 8.3–10.6)
CHLORIDE SERPL-SCNC: 97 MMOL/L (ref 99–110)
CO2 SERPL-SCNC: 26 MMOL/L (ref 21–32)
CREAT SERPL-MCNC: 2.8 MG/DL (ref 0.8–1.3)
EOSINOPHIL # BLD: 0.17 K/UL
EOSINOPHILS RELATIVE PERCENT: 2 % (ref 0–3)
ERYTHROCYTE [DISTWIDTH] IN BLOOD BY AUTOMATED COUNT: 17.2 % (ref 11.7–14.9)
GFR, ESTIMATED: 23 ML/MIN/1.73M2
GLUCOSE BLD-MCNC: 42 MG/DL (ref 74–99)
GLUCOSE BLD-MCNC: 75 MG/DL (ref 74–99)
GLUCOSE SERPL-MCNC: 37 MG/DL (ref 74–99)
HCT VFR BLD AUTO: 26.9 % (ref 42–52)
HGB BLD-MCNC: 8 G/DL (ref 13.5–18)
IMM GRANULOCYTES # BLD AUTO: 0.06 K/UL
IMM GRANULOCYTES NFR BLD: 1 %
LYMPHOCYTES NFR BLD: 0.73 K/UL
LYMPHOCYTES RELATIVE PERCENT: 7 % (ref 24–44)
MCH RBC QN AUTO: 28.2 PG (ref 27–31)
MCHC RBC AUTO-ENTMCNC: 29.7 G/DL (ref 32–36)
MCV RBC AUTO: 94.7 FL (ref 78–100)
MONOCYTES NFR BLD: 0.41 K/UL
MONOCYTES NFR BLD: 4 % (ref 0–4)
NEUTROPHILS NFR BLD: 86 % (ref 36–66)
NEUTS SEG NFR BLD: 8.6 K/UL
PLATELET # BLD AUTO: 152 K/UL (ref 140–440)
PMV BLD AUTO: 8.5 FL (ref 7.5–11.1)
POTASSIUM SERPL-SCNC: 4.9 MMOL/L (ref 3.5–5.1)
PROT SERPL-MCNC: 7.1 G/DL (ref 6.4–8.2)
RBC # BLD AUTO: 2.84 M/UL (ref 4.6–6.2)
SODIUM SERPL-SCNC: 132 MMOL/L (ref 136–145)
WBC OTHER # BLD: 10 K/UL (ref 4–10.5)

## 2024-10-27 PROCEDURE — 2580000003 HC RX 258: Performed by: STUDENT IN AN ORGANIZED HEALTH CARE EDUCATION/TRAINING PROGRAM

## 2024-10-27 PROCEDURE — 36415 COLL VENOUS BLD VENIPUNCTURE: CPT

## 2024-10-27 PROCEDURE — 6360000002 HC RX W HCPCS: Performed by: STUDENT IN AN ORGANIZED HEALTH CARE EDUCATION/TRAINING PROGRAM

## 2024-10-27 PROCEDURE — 82962 GLUCOSE BLOOD TEST: CPT

## 2024-10-27 PROCEDURE — 2700000000 HC OXYGEN THERAPY PER DAY

## 2024-10-27 PROCEDURE — 89220 SPUTUM SPECIMEN COLLECTION: CPT

## 2024-10-27 PROCEDURE — 94640 AIRWAY INHALATION TREATMENT: CPT

## 2024-10-27 PROCEDURE — 94761 N-INVAS EAR/PLS OXIMETRY MLT: CPT

## 2024-10-27 PROCEDURE — 85025 COMPLETE CBC W/AUTO DIFF WBC: CPT

## 2024-10-27 PROCEDURE — 2000000000 HC ICU R&B

## 2024-10-27 PROCEDURE — 94003 VENT MGMT INPAT SUBQ DAY: CPT

## 2024-10-27 PROCEDURE — 6370000000 HC RX 637 (ALT 250 FOR IP): Performed by: STUDENT IN AN ORGANIZED HEALTH CARE EDUCATION/TRAINING PROGRAM

## 2024-10-27 PROCEDURE — 6370000000 HC RX 637 (ALT 250 FOR IP): Performed by: INTERNAL MEDICINE

## 2024-10-27 PROCEDURE — 80053 COMPREHEN METABOLIC PANEL: CPT

## 2024-10-27 PROCEDURE — 2580000003 HC RX 258: Performed by: FAMILY MEDICINE

## 2024-10-27 RX ORDER — GLUCAGON 1 MG/ML
1 KIT INJECTION PRN
Status: DISCONTINUED | OUTPATIENT
Start: 2024-10-27 | End: 2024-11-08 | Stop reason: HOSPADM

## 2024-10-27 RX ORDER — DEXTROSE MONOHYDRATE 100 MG/ML
INJECTION, SOLUTION INTRAVENOUS CONTINUOUS PRN
Status: DISCONTINUED | OUTPATIENT
Start: 2024-10-27 | End: 2024-11-08 | Stop reason: HOSPADM

## 2024-10-27 RX ORDER — ALPRAZOLAM 0.5 MG
0.5 TABLET ORAL ONCE
Status: DISCONTINUED | OUTPATIENT
Start: 2024-10-27 | End: 2024-11-08 | Stop reason: HOSPADM

## 2024-10-27 RX ADMIN — ALBUTEROL SULFATE 2.5 MG: 2.5 SOLUTION RESPIRATORY (INHALATION) at 11:16

## 2024-10-27 RX ADMIN — ALPRAZOLAM 0.5 MG: 0.5 TABLET ORAL at 03:24

## 2024-10-27 RX ADMIN — ALBUTEROL SULFATE 2.5 MG: 2.5 SOLUTION RESPIRATORY (INHALATION) at 07:09

## 2024-10-27 RX ADMIN — HEPARIN SODIUM 5000 UNITS: 5000 INJECTION INTRAVENOUS; SUBCUTANEOUS at 06:46

## 2024-10-27 RX ADMIN — ALPRAZOLAM 0.5 MG: 0.5 TABLET ORAL at 09:39

## 2024-10-27 RX ADMIN — MIDODRINE HYDROCHLORIDE 10 MG: 5 TABLET ORAL at 09:39

## 2024-10-27 RX ADMIN — MEROPENEM 1000 MG: 1 INJECTION, POWDER, FOR SOLUTION INTRAVENOUS at 00:51

## 2024-10-27 RX ADMIN — TRAZODONE HYDROCHLORIDE 50 MG: 50 TABLET ORAL at 20:59

## 2024-10-27 RX ADMIN — DEXTROSE MONOHYDRATE 250 ML: 100 INJECTION, SOLUTION INTRAVENOUS at 06:45

## 2024-10-27 RX ADMIN — ALBUTEROL SULFATE 2.5 MG: 2.5 SOLUTION RESPIRATORY (INHALATION) at 21:43

## 2024-10-27 RX ADMIN — MEROPENEM 1000 MG: 1 INJECTION, POWDER, FOR SOLUTION INTRAVENOUS at 07:06

## 2024-10-27 RX ADMIN — ALPRAZOLAM 0.5 MG: 0.5 TABLET ORAL at 23:09

## 2024-10-27 RX ADMIN — HEPARIN SODIUM 5000 UNITS: 5000 INJECTION INTRAVENOUS; SUBCUTANEOUS at 20:59

## 2024-10-27 RX ADMIN — MEROPENEM 1000 MG: 1 INJECTION, POWDER, FOR SOLUTION INTRAVENOUS at 23:07

## 2024-10-27 RX ADMIN — ALBUTEROL SULFATE 2.5 MG: 2.5 SOLUTION RESPIRATORY (INHALATION) at 23:39

## 2024-10-27 RX ADMIN — ALBUTEROL SULFATE 2.5 MG: 2.5 SOLUTION RESPIRATORY (INHALATION) at 16:22

## 2024-10-27 RX ADMIN — ALPRAZOLAM 0.5 MG: 0.5 TABLET ORAL at 17:09

## 2024-10-27 RX ADMIN — MEROPENEM 1000 MG: 1 INJECTION, POWDER, FOR SOLUTION INTRAVENOUS at 15:34

## 2024-10-27 RX ADMIN — LANSOPRAZOLE 30 MG: 30 TABLET, ORALLY DISINTEGRATING ORAL at 06:46

## 2024-10-27 RX ADMIN — ATORVASTATIN CALCIUM 40 MG: 40 TABLET, FILM COATED ORAL at 21:00

## 2024-10-27 RX ADMIN — Medication 4 ML: at 07:10

## 2024-10-27 RX ADMIN — ALBUTEROL SULFATE 2.5 MG: 2.5 SOLUTION RESPIRATORY (INHALATION) at 01:59

## 2024-10-27 RX ADMIN — OXYCODONE HYDROCHLORIDE 5 MG: 5 TABLET ORAL at 21:00

## 2024-10-27 RX ADMIN — OXYCODONE HYDROCHLORIDE 5 MG: 5 TABLET ORAL at 06:47

## 2024-10-27 RX ADMIN — HEPARIN SODIUM 5000 UNITS: 5000 INJECTION INTRAVENOUS; SUBCUTANEOUS at 13:37

## 2024-10-27 RX ADMIN — GABAPENTIN 100 MG: 100 CAPSULE ORAL at 09:39

## 2024-10-27 RX ADMIN — OXYCODONE HYDROCHLORIDE 5 MG: 5 TABLET ORAL at 12:47

## 2024-10-27 RX ADMIN — ALBUTEROL SULFATE 2.5 MG: 2.5 SOLUTION RESPIRATORY (INHALATION) at 04:50

## 2024-10-27 ASSESSMENT — PAIN SCALES - WONG BAKER
WONGBAKER_NUMERICALRESPONSE: HURTS A LITTLE BIT

## 2024-10-27 ASSESSMENT — PAIN DESCRIPTION - ORIENTATION
ORIENTATION: MID
ORIENTATION: RIGHT;LEFT

## 2024-10-27 ASSESSMENT — PULMONARY FUNCTION TESTS
PIF_VALUE: 16
PIF_VALUE: 21
PIF_VALUE: 24
PIF_VALUE: 17
PIF_VALUE: 21
PIF_VALUE: 19
PIF_VALUE: 20
PIF_VALUE: 21
PIF_VALUE: 24
PIF_VALUE: 20
PIF_VALUE: 22
PIF_VALUE: 19
PIF_VALUE: 15
PIF_VALUE: 20
PIF_VALUE: 22
PIF_VALUE: 26

## 2024-10-27 ASSESSMENT — PAIN DESCRIPTION - LOCATION
LOCATION: ABDOMEN
LOCATION: BACK
LOCATION: LEG

## 2024-10-27 ASSESSMENT — PAIN DESCRIPTION - DESCRIPTORS
DESCRIPTORS: ACHING
DESCRIPTORS: DISCOMFORT
DESCRIPTORS: ACHING

## 2024-10-27 ASSESSMENT — PAIN SCALES - GENERAL
PAINLEVEL_OUTOF10: 8
PAINLEVEL_OUTOF10: 8
PAINLEVEL_OUTOF10: 0
PAINLEVEL_OUTOF10: 2
PAINLEVEL_OUTOF10: 8
PAINLEVEL_OUTOF10: 2
PAINLEVEL_OUTOF10: 8
PAINLEVEL_OUTOF10: 2

## 2024-10-27 NOTE — PROGRESS NOTES
PHARMACY VANCOMYCIN MONITORING SERVICE  Pharmacy consulted by Dr. Garrison for monitoring and adjustment.    Indication for treatment: Vancomycin indication: HAP  Goal trough: Trough Goal: 15-20 mcg/mL  AUC/EULALIO: 400-600    Risk Factors for MRSA Identified:   Hospitalization within the past 90 days    Pertinent Laboratory Values:   Temp Readings from Last 3 Encounters:   10/27/24 98.2 °F (36.8 °C) (Oral)     Recent Labs     10/25/24  2245 10/26/24  1010 10/27/24  0610   WBC 6.2 4.9 10.0     Recent Labs     10/25/24  2245 10/26/24  1010 10/27/24  0535   BUN 13 17 22*   CREATININE 1.8* 2.1* 2.8*     Estimated Creatinine Clearance: 31 mL/min (A) (based on SCr of 2.8 mg/dL (H)).    Intake/Output Summary (Last 24 hours) at 10/27/2024 1102  Last data filed at 10/27/2024 0650  Gross per 24 hour   Intake --   Output 545 ml   Net -545 ml     Last Encounter Weight:  Wt Readings from Last 3 Encounters:   10/27/24 99 kg (218 lb 4.1 oz)       Pertinent Cultures:   Date    Source    Results  10/25   Blood    NG x2 days  10/26   Sputum (trach aspirate) Sent   10/26   MRSA Nasal   Negative   10/25   Respiratory panel   Negative     Vancomycin level:   TROUGH:  No results for input(s): \"VANCOTROUGH\" in the last 72 hours.  RANDOM:  No results for input(s): \"VANCORANDOM\" in the last 72 hours.    Assessment:  HPI: patient is a 64 y.o. male with tracheostomy dependence who presents with acute on chronic respiratory failure with hypoxia requiring ventilatory support and possible pneumonia. History of ESRD on iHD MWF, CAD with PCI 2022, anemia.  SCr, BUN, and urine output: Scr 2.8 (increased from 1.8 PTA), BUN 22, UoP 0.2 ml/kg/hr  ESRD on iHD MWF -orders placed  Day(s) of therapy: 3 of 7  Vancomycin concentration: TBD    Plan:  No vancomycin today. Planned for iHD tomorrow and pre-HD level ordered  Pharmacy will continue to monitor patient and adjust therapy as indicated    VANCOMYCIN CONCENTRATION SCHEDULED FOR 10/29 @ 1800    Thank you for

## 2024-10-27 NOTE — DISCHARGE INSTR - DIET

## 2024-10-27 NOTE — PROGRESS NOTES
Comprehensive Nutrition Assessment    Type and Reason for Visit:  Initial, Positive Nutrition Screen (Home TF)    Nutrition Recommendations/Plan:   Begin Renal formula @ 40 ml/hr to provide 1728 kcals (80% of EEN), 78 gm protein (100% of EPN). Standard FWF, may adjust according to nephrology fluid recs   Will continue PO diet, recommend SLP eval   Switch supplement to Renal oral nutrition supplement daily   If pt is eating 50% of three meals per day, will adjust EN order. If nocturnal feeds or bolus feeds are desired to encourage further po intakes, please consult dietitian for TF order and mgt to adjust EN.   Monitor for GI status, glucose, labs, fluids, Po intakes, and POC     Malnutrition Assessment:  Malnutrition Status:  Moderate malnutrition (10/27/24 1329)    Context:  Social/Environmental Circumstances     Findings of the 6 clinical characteristics of malnutrition:  Energy Intake:  Unable to assess (Pt was eating orally and receiving EN PTA)  Weight Loss:  No significant weight loss     Body Fat Loss:  Mild body fat loss Triceps, Orbital   Muscle Mass Loss:  Mild muscle mass loss Clavicles (pectoralis & deltoids), Hand (interosseous)  Fluid Accumulation:  Unable to assess Extremities   Strength:  Not Performed    Nutrition Assessment:    Admitted with acute on chronic respiratory failure. Hx ESRD on HD, anemia of chronic disease, HF, DMII, CAD s/p stents x 2, trach/PEG tube. Currently on pressure support, plans to wean vent settings. Minimal po data documented, pt resting at visit. Observed pt with mild wasting in fat/muscle mass areas. Meets criteria for malnutrition. H/o pt tolerating TF of Nepro 1.8 Audi formula at 50 ml/hr, no formula hung or running at visit, unclear plan for nutrition. Pt remains on Regular diet and standard supplements, will adjust. Recommend SLP eval for swallow safety. Some wounds present on abdomen. MD provided verbal order to manage tube feeds. Will monitor as a high nutrition

## 2024-10-27 NOTE — PROGRESS NOTES
V2.0  [facility] Hospitalist Progress Note      Name:  Tico Day /Age/Sex: 1960  (64 y.o. male)   MRN & CSN:  [id number] & [id number] Encounter Date/Time: 10/27/2024 10:55 AM EDT    Location:  -A PCP: Carlos Nina MD       Hospital Day: 3    Assessment and Plan:   Tico Day is a 64 y.o. male with pmh of  ESRD on HD , chronic systolic heart failure, chronic respiratory failure with tracheostomy since , left atrial thrombus, wide-complex tachycardia, CAD with history of PCI in , COPD, anemia of chronic disease, obstructive sleep apnea, anxiety, chronic pain with opioid dependence  who presents with Acute on chronic respiratory failure      Plan:  Tracheostomy dependence  Acute on chronic respiratory failure with hypoxia requiring ventilatory support  Secondary to acute on chronic systolic heart failure, possible pneumonia  Chest x-ray personally reviewed bilateral pulmonary infiltrates R>L and cardiomegaly Pro-BNP 43,703.  Last echocardiogram from 2024 reviewed EF of 40%.  Negative respiratory viral panel  monitor intake and output measure daily weight  Nephrology consultation for inpatient management of HD and volume management  IV meropenem dc IV vancomycin as MRSA nares negative  Will start pressure support trial as tolerated wean from vent if tolerated     ESRD on HD   Nephrology consultation for inpatient management of hemodialysis     Elevated troponin secondary to demand ischemia  Coronary artery disease with history of PCI in   EKG 94/min right bundle branch block normal sinus rhythm  Historically unable to tolerate antiplatelet therapy due to recurrent GI bleed  Continue home Lipitor     Anemia of chronic disease  Stable Hb trend, no evidence of active bleeding  Monitor CBC     Obesity/BMI 31.6    Diet ADULT DIET; Regular  ADULT ORAL NUTRITION SUPPLEMENT; Breakfast, Lunch, Dinner; Standard High Calorie/High Protein

## 2024-10-27 NOTE — PROGRESS NOTES
[address] [place] [address], [address] [place] [address]  Phone: [phone]  Office Hours: 8:30AM - 4:30PM  Monday - Friday     Nephrology Progress Note  10/27/2024 9:14 AM        Assessment and Plan:     Patient Active Problem List:     Acute on chronic respiratory failure  Resp failure  hypotension      Renal Function Monitoring stable  Dialysis Status MWF hemodialysis   Blood Pressure Management low normal tolerating well  Volume Status mild   Electrolytes Na 132 K normal  Acid/Base normal  Mineral/Bone Disease Management see orders  Anemia Management on retacrit with dialysis check iron    Dr Hoskins will follow in am    Poncho Hill MD FACP, FASN, FAS    Subjective:   Admit Date: 10/25/2024  PCP: Carlos Nina MD  Interval History: awake alert    Diet: Diet NPO Exceptions are: Sips of Water with Meds      Data:   Scheduled Meds:   atorvastatin  40 mg Oral Nightly    gabapentin  100 mg Per G Tube Daily    lansoprazole  30 mg Oral QAM AC    traZODone  50 mg Oral Nightly    meropenem  1,000 mg IntraVENous Q8H    midodrine  10 mg Oral TID WC    heparin (porcine)  5,000 Units SubCUTAneous 3 times per day    vancomycin  1,250 mg IntraVENous Q24H    albuterol  2.5 mg Nebulization Q4H     Continuous Infusions:   dextrose       PRN Meds:glucose, dextrose bolus **OR** dextrose bolus, glucagon (rDNA), dextrose, ipratropium 0.5 mg-albuterol 2.5 mg, ALPRAZolam, oxyCODONE, sodium chloride (Inhalant)  I/O last 3 completed shifts:  In: 650 [IV Piggyback:650]  Out: 545 [Urine:545]  No intake/output data recorded.    Intake/Output Summary (Last 24 hours) at 10/27/2024 0914  Last data filed at 10/27/2024 0650  Gross per 24 hour   Intake --   Output 545 ml   Net -545 ml     CBC:   Recent Labs     10/25/24  2245 10/26/24  1010 10/27/24  0610   WBC 6.2 4.9 10.0   HGB 8.3* 7.1* 8.0*   * 127* 152     BMP:    Recent Labs     10/25/24  2245 10/26/24  1010 10/27/24  0535   * 132* 132*   K 4.3 4.4 4.9   CL 96* 99 97*

## 2024-10-28 LAB
ALBUMIN SERPL-MCNC: 2.4 G/DL (ref 3.4–5)
ALBUMIN/GLOB SERPL: 0.5 {RATIO} (ref 1.1–2.2)
ALP SERPL-CCNC: 159 U/L (ref 40–129)
ALT SERPL-CCNC: 5 U/L (ref 10–40)
ANION GAP SERPL CALCULATED.3IONS-SCNC: 8 MMOL/L (ref 9–17)
AST SERPL-CCNC: 23 U/L (ref 15–37)
BILIRUB SERPL-MCNC: 0.5 MG/DL (ref 0–1)
BUN SERPL-MCNC: 28 MG/DL (ref 7–20)
CALCIUM SERPL-MCNC: 11.5 MG/DL (ref 8.3–10.6)
CHLORIDE SERPL-SCNC: 97 MMOL/L (ref 99–110)
CO2 SERPL-SCNC: 26 MMOL/L (ref 21–32)
CREAT SERPL-MCNC: 3.4 MG/DL (ref 0.8–1.3)
ERYTHROCYTE [DISTWIDTH] IN BLOOD BY AUTOMATED COUNT: 17.4 % (ref 11.7–14.9)
FERRITIN SERPL-MCNC: 305 NG/ML (ref 30–400)
GFR, ESTIMATED: 18 ML/MIN/1.73M2
GLUCOSE BLD-MCNC: 73 MG/DL (ref 74–99)
GLUCOSE BLD-MCNC: 85 MG/DL (ref 74–99)
GLUCOSE BLD-MCNC: 95 MG/DL (ref 74–99)
GLUCOSE SERPL-MCNC: 62 MG/DL (ref 74–99)
HCT VFR BLD AUTO: 25.3 % (ref 42–52)
HGB BLD-MCNC: 7.6 G/DL (ref 13.5–18)
IRON SATN MFR SERPL: 15 % (ref 15–50)
IRON SERPL-MCNC: 21 UG/DL (ref 59–158)
MCH RBC QN AUTO: 28.6 PG (ref 27–31)
MCHC RBC AUTO-ENTMCNC: 30 G/DL (ref 32–36)
MCV RBC AUTO: 95.1 FL (ref 78–100)
MICROORGANISM SPEC CULT: ABNORMAL
MICROORGANISM SPEC CULT: ABNORMAL
MICROORGANISM/AGENT SPEC: ABNORMAL
PLATELET # BLD AUTO: 152 K/UL (ref 140–440)
PMV BLD AUTO: 8.8 FL (ref 7.5–11.1)
POTASSIUM SERPL-SCNC: 5 MMOL/L (ref 3.5–5.1)
PROT SERPL-MCNC: 7 G/DL (ref 6.4–8.2)
RBC # BLD AUTO: 2.66 M/UL (ref 4.6–6.2)
RETICS # AUTO: 0.11 M/UL
RETICS/RBC NFR AUTO: 4 % (ref 0.2–2)
SODIUM SERPL-SCNC: 131 MMOL/L (ref 136–145)
SPECIMEN DESCRIPTION: ABNORMAL
TIBC SERPL-MCNC: 136 UG/DL (ref 260–445)
UNSATURATED IRON BINDING CAPACITY: 115 UG/DL (ref 110–370)
WBC OTHER # BLD: 4.7 K/UL (ref 4–10.5)

## 2024-10-28 PROCEDURE — 6360000002 HC RX W HCPCS: Performed by: STUDENT IN AN ORGANIZED HEALTH CARE EDUCATION/TRAINING PROGRAM

## 2024-10-28 PROCEDURE — 2580000003 HC RX 258: Performed by: STUDENT IN AN ORGANIZED HEALTH CARE EDUCATION/TRAINING PROGRAM

## 2024-10-28 PROCEDURE — 94640 AIRWAY INHALATION TREATMENT: CPT

## 2024-10-28 PROCEDURE — 92610 EVALUATE SWALLOWING FUNCTION: CPT

## 2024-10-28 PROCEDURE — 2000000000 HC ICU R&B

## 2024-10-28 PROCEDURE — 36415 COLL VENOUS BLD VENIPUNCTURE: CPT

## 2024-10-28 PROCEDURE — 83540 ASSAY OF IRON: CPT

## 2024-10-28 PROCEDURE — 6360000002 HC RX W HCPCS: Performed by: INTERNAL MEDICINE

## 2024-10-28 PROCEDURE — 89220 SPUTUM SPECIMEN COLLECTION: CPT

## 2024-10-28 PROCEDURE — 6370000000 HC RX 637 (ALT 250 FOR IP): Performed by: STUDENT IN AN ORGANIZED HEALTH CARE EDUCATION/TRAINING PROGRAM

## 2024-10-28 PROCEDURE — 2700000000 HC OXYGEN THERAPY PER DAY

## 2024-10-28 PROCEDURE — 94003 VENT MGMT INPAT SUBQ DAY: CPT

## 2024-10-28 PROCEDURE — 82728 ASSAY OF FERRITIN: CPT

## 2024-10-28 PROCEDURE — 2500000003 HC RX 250 WO HCPCS: Performed by: STUDENT IN AN ORGANIZED HEALTH CARE EDUCATION/TRAINING PROGRAM

## 2024-10-28 PROCEDURE — 90935 HEMODIALYSIS ONE EVALUATION: CPT

## 2024-10-28 PROCEDURE — 85045 AUTOMATED RETICULOCYTE COUNT: CPT

## 2024-10-28 PROCEDURE — 6370000000 HC RX 637 (ALT 250 FOR IP): Performed by: PREVENTIVE MEDICINE

## 2024-10-28 PROCEDURE — 80053 COMPREHEN METABOLIC PANEL: CPT

## 2024-10-28 PROCEDURE — 6370000000 HC RX 637 (ALT 250 FOR IP): Performed by: INTERNAL MEDICINE

## 2024-10-28 PROCEDURE — 85027 COMPLETE CBC AUTOMATED: CPT

## 2024-10-28 PROCEDURE — 82962 GLUCOSE BLOOD TEST: CPT

## 2024-10-28 PROCEDURE — 83550 IRON BINDING TEST: CPT

## 2024-10-28 PROCEDURE — 94761 N-INVAS EAR/PLS OXIMETRY MLT: CPT

## 2024-10-28 RX ORDER — ALPRAZOLAM 0.5 MG
0.5 TABLET ORAL ONCE
Status: COMPLETED | OUTPATIENT
Start: 2024-10-28 | End: 2024-10-28

## 2024-10-28 RX ORDER — OXYCODONE HYDROCHLORIDE 5 MG/1
5 TABLET ORAL ONCE
Status: COMPLETED | OUTPATIENT
Start: 2024-10-28 | End: 2024-10-28

## 2024-10-28 RX ADMIN — OXYCODONE HYDROCHLORIDE 5 MG: 5 TABLET ORAL at 21:12

## 2024-10-28 RX ADMIN — MICONAZOLE NITRATE: 2 POWDER TOPICAL at 21:16

## 2024-10-28 RX ADMIN — GABAPENTIN 100 MG: 100 CAPSULE ORAL at 10:00

## 2024-10-28 RX ADMIN — OXYCODONE HYDROCHLORIDE 5 MG: 5 TABLET ORAL at 04:17

## 2024-10-28 RX ADMIN — HEPARIN SODIUM 5000 UNITS: 5000 INJECTION INTRAVENOUS; SUBCUTANEOUS at 17:15

## 2024-10-28 RX ADMIN — PIPERACILLIN AND TAZOBACTAM 3375 MG: 3; .375 INJECTION, POWDER, LYOPHILIZED, FOR SOLUTION INTRAVENOUS at 13:07

## 2024-10-28 RX ADMIN — ATORVASTATIN CALCIUM 40 MG: 40 TABLET, FILM COATED ORAL at 21:12

## 2024-10-28 RX ADMIN — OXYCODONE HYDROCHLORIDE 5 MG: 5 TABLET ORAL at 23:54

## 2024-10-28 RX ADMIN — ALPRAZOLAM 0.5 MG: 0.5 TABLET ORAL at 07:14

## 2024-10-28 RX ADMIN — MEROPENEM 1000 MG: 1 INJECTION, POWDER, FOR SOLUTION INTRAVENOUS at 05:38

## 2024-10-28 RX ADMIN — ALBUTEROL SULFATE 2.5 MG: 2.5 SOLUTION RESPIRATORY (INHALATION) at 15:13

## 2024-10-28 RX ADMIN — HEPARIN SODIUM 5000 UNITS: 5000 INJECTION INTRAVENOUS; SUBCUTANEOUS at 21:12

## 2024-10-28 RX ADMIN — ALBUTEROL SULFATE 2.5 MG: 2.5 SOLUTION RESPIRATORY (INHALATION) at 04:17

## 2024-10-28 RX ADMIN — EPOETIN ALFA-EPBX 10000 UNITS: 10000 INJECTION, SOLUTION INTRAVENOUS; SUBCUTANEOUS at 15:04

## 2024-10-28 RX ADMIN — ALPRAZOLAM 0.5 MG: 0.5 TABLET ORAL at 19:29

## 2024-10-28 RX ADMIN — MIDODRINE HYDROCHLORIDE 10 MG: 5 TABLET ORAL at 13:02

## 2024-10-28 RX ADMIN — ALBUTEROL SULFATE 2.5 MG: 2.5 SOLUTION RESPIRATORY (INHALATION) at 23:22

## 2024-10-28 RX ADMIN — OXYCODONE HYDROCHLORIDE 5 MG: 5 TABLET ORAL at 17:30

## 2024-10-28 RX ADMIN — ALBUTEROL SULFATE 2.5 MG: 2.5 SOLUTION RESPIRATORY (INHALATION) at 07:24

## 2024-10-28 RX ADMIN — HEPARIN SODIUM 5000 UNITS: 5000 INJECTION INTRAVENOUS; SUBCUTANEOUS at 04:18

## 2024-10-28 RX ADMIN — TRAZODONE HYDROCHLORIDE 50 MG: 50 TABLET ORAL at 21:12

## 2024-10-28 RX ADMIN — LANSOPRAZOLE 30 MG: 30 TABLET, ORALLY DISINTEGRATING ORAL at 06:11

## 2024-10-28 RX ADMIN — ALBUTEROL SULFATE 2.5 MG: 2.5 SOLUTION RESPIRATORY (INHALATION) at 20:03

## 2024-10-28 RX ADMIN — ALPRAZOLAM 0.5 MG: 0.5 TABLET ORAL at 05:38

## 2024-10-28 ASSESSMENT — PAIN SCALES - GENERAL
PAINLEVEL_OUTOF10: 9
PAINLEVEL_OUTOF10: 6
PAINLEVEL_OUTOF10: 9
PAINLEVEL_OUTOF10: 8
PAINLEVEL_OUTOF10: 9

## 2024-10-28 ASSESSMENT — PAIN DESCRIPTION - LOCATION
LOCATION: COCCYX;HIP
LOCATION: BACK
LOCATION: HIP
LOCATION: BACK

## 2024-10-28 ASSESSMENT — PULMONARY FUNCTION TESTS
PIF_VALUE: 16
PIF_VALUE: 23
PIF_VALUE: 17
PIF_VALUE: 16
PIF_VALUE: 23
PIF_VALUE: 23
PIF_VALUE: 22
PIF_VALUE: 16
PIF_VALUE: 22
PIF_VALUE: 16
PIF_VALUE: 22
PIF_VALUE: 21
PIF_VALUE: 26
PIF_VALUE: 23
PIF_VALUE: 21
PIF_VALUE: 24
PIF_VALUE: 22
PIF_VALUE: 23
PIF_VALUE: 25
PIF_VALUE: 19
PIF_VALUE: 22
PIF_VALUE: 21
PIF_VALUE: 23
PIF_VALUE: 23
PIF_VALUE: 16

## 2024-10-28 ASSESSMENT — PAIN DESCRIPTION - ORIENTATION
ORIENTATION: MID
ORIENTATION: MID
ORIENTATION: RIGHT
ORIENTATION: RIGHT;LEFT;LOWER;MID;UPPER

## 2024-10-28 ASSESSMENT — PAIN DESCRIPTION - FREQUENCY: FREQUENCY: INTERMITTENT

## 2024-10-28 ASSESSMENT — PAIN DESCRIPTION - DESCRIPTORS
DESCRIPTORS: ACHING

## 2024-10-28 ASSESSMENT — PAIN DESCRIPTION - ONSET: ONSET: GRADUAL

## 2024-10-28 ASSESSMENT — PAIN - FUNCTIONAL ASSESSMENT: PAIN_FUNCTIONAL_ASSESSMENT: ACTIVITIES ARE NOT PREVENTED

## 2024-10-28 ASSESSMENT — PAIN DESCRIPTION - PAIN TYPE: TYPE: CHRONIC PAIN

## 2024-10-28 NOTE — PROGRESS NOTES
Nephrology Progress Note        [address] [facility], [address]  [place] [address]  Phone: [phone]  Office Hours: 8:30AM - 4:30PM  Monday - Friday        10/28/2024 8:03 AM  Subjective:   Admit Date: 10/25/2024  PCP: Carlos Nina MD  Interval History:   On 30%fio2      Diet: ADULT ORAL NUTRITION SUPPLEMENT; Breakfast; Renal Oral Supplement  ADULT DIET; Regular; likes chocolate vs van  ADULT TUBE FEEDING; PEG; Renal Formula; Continuous; 20; Yes; 10; Q 4 hours; 40; 30; Q 4 hours      Data:   Scheduled Meds:   epoetin gabriel-epbx  10,000 Units IntraVENous Once per day on Monday Wednesday Friday    ALPRAZolam  0.5 mg PEG Tube Once    atorvastatin  40 mg Oral Nightly    gabapentin  100 mg Per G Tube Daily    lansoprazole  30 mg Oral QAM AC    traZODone  50 mg Oral Nightly    meropenem  1,000 mg IntraVENous Q8H    midodrine  10 mg Oral TID WC    heparin (porcine)  5,000 Units SubCUTAneous 3 times per day    albuterol  2.5 mg Nebulization Q4H     Continuous Infusions:   dextrose       PRN Meds:glucose, dextrose bolus **OR** dextrose bolus, glucagon (rDNA), dextrose, ipratropium 0.5 mg-albuterol 2.5 mg, ALPRAZolam, oxyCODONE, sodium chloride (Inhalant)  I/O last 3 completed shifts:  In: -   Out: 1100 [Urine:1100]  No intake/output data recorded.    Intake/Output Summary (Last 24 hours) at 10/28/2024 0803  Last data filed at 10/27/2024 1800  Gross per 24 hour   Intake --   Output 700 ml   Net -700 ml       CBC:   Recent Labs     10/26/24  1010 10/27/24  0610 10/28/24  0635   WBC 4.9 10.0 4.7   HGB 7.1* 8.0* 7.6*   * 152 152       BMP:    Recent Labs     10/26/24  1010 10/27/24  0535 10/28/24  0635   * 132* 131*   K 4.4 4.9 5.0   CL 99 97* 97*   CO2 27 26 26   BUN 17 22* 28*   CREATININE 2.1* 2.8* 3.4*   GLUCOSE 54* 37* 62*   CALCIUM 10.4 11.4* 11.5*     Hepatic:   Recent Labs     10/26/24  1010 10/27/24  0535 10/28/24  0635   AST 21 20 23   ALT 7* 6* 5*   BILITOT 0.4 0.5 0.5   ALKPHOS 147* 155*

## 2024-10-28 NOTE — PROCEDURES
INSTRUMENTAL SWALLOW REPORT  MODIFIED BARIUM SWALLOW    NAME: Tico Day   : 1960  MRN: [id number]       Date of Eval: 10/28/2024     Ordering Physician: Dr. Emily Ramirez        Referring Diagnosis(es): Referring Diagnosis: dysphagia R13.10    Past Medical History:  has no past medical history on file.  Past Surgical History:  has no past surgical history on file.          Type of Study: Initial FEES  Results of Prior Study: unable to locate report    Recent CXR/CT of Chest: see chart    Patient Complaints/Reason for Referral:  Tico Day was referred for a MBS to assess the efficiency of his/her swallow function, assess for aspiration, and to make recommendations regarding safe dietary consistencies, effective compensatory strategies, and safe eating environment.  Patient complaints: discomfort with trach tube    Onset of problem:   Date of Onset: prior to admission            Behavior/Cognition/Vision/Hearing:  Behavior/Cognition: Alert;Cooperative  Vision: Within Functional Limits  Hearing: Within functional limits    Impressions: Tico Day was seen for inpatient flexible endoscopic swallow evaluation (FEES). He was seated upright in bed, alert, cooperative.     The flexible endoscope was passed easily through the right nasal vault and the hypopharynx was visualized. Noted edema of the epiglottis, arytenoids, and false vocal folds. Scant thin, clear secretions observed in the valleculae, lateral channels, and pyriform sinuses. Vocal folds achieved complete adduction with compression of the false folds during phonation. Movement of soft palate, BOT and pharyngeal walls appeared functional.    Pt presented with trials of ice chips, thin liquids via tsp/straw, nectar thick liquids via straw, puree, and regular solids. Oral stage WFL with intact labial seal, timely oral prep/transit and complete clearance. Pharyngeal stage WFL with intact airway protection and functional bolus efficiency.  Yes  Type of devices: All fall risk precautions in place  Restraints Initially in Place: No      Goals:    Long Term:     Short Term:     Goal 1: Pt/caregivers will indicate understanding of dysphagia education and recommendations.  Goal 2: Pt/caregivers will demonstrate understanding of SLP recommendations/POC       Oral Preparation / Oral Phase  WFL        Pharyngeal Phase  WFL      Esophageal Phase  Esophageal Screen: WFL        Pain      Pain Level: 9  Pain Location: Back      Therapy Time:   Individual Concurrent Group Co-treatment   Time In 1115         Time Out 1145         Minutes 30                   YONY Sheffield, 10/28/2024, 12:21 PM

## 2024-10-28 NOTE — PROGRESS NOTES
V2.0  [facility] [place]      Name:  Tico Day /Age/Sex: 1960  (64 y.o. male)   MRN & CSN:  [id number] & [id number] Encounter Date/Time: 10/28/2024 10:55 AM EDT    Location:  -A PCP: Carlos Nina MD       Hospital Day: 4    Assessment and Plan:   Tico Day is a 64 y.o. male with pmh of  ESRD on HD , chronic systolic heart failure, chronic respiratory failure with tracheostomy since , left atrial thrombus, wide-complex tachycardia, CAD with history of PCI in , COPD, anemia of chronic disease, obstructive sleep apnea, anxiety, chronic pain with opioid dependence  who presents with Acute on chronic respiratory failure      Plan:  Tracheostomy dependence  Acute on chronic respiratory failure with hypoxia requiring ventilatory support  Secondary to acute on chronic systolic heart failure, possible pneumonia  Chest x-ray personally reviewed bilateral pulmonary infiltrates R>L and cardiomegaly Pro-BNP 43,703.  Last echocardiogram from 2024 reviewed EF of 40%.  Negative respiratory viral panel  monitor intake and output measure daily weight  Nephrology consultation for inpatient management of HD and volume management  Sputum culture growing Pseudomonas aeruginosa sensitive to Zosyn switched from meropenem to Zosyn  pressure support trial as tolerated      ESRD on HD   Nephrology consultation for inpatient management of hemodialysis     Elevated troponin secondary to demand ischemia  Coronary artery disease with history of PCI in   EKG 94/min right bundle branch block normal sinus rhythm  Historically unable to tolerate antiplatelet therapy due to recurrent GI bleed  Continue home Lipitor     Anemia of chronic disease  Stable Hb trend, no evidence of active bleeding  Monitor CBC     Obesity/BMI 31.6    Diet ADULT TUBE FEEDING; PEG; Renal Formula; Continuous; 20; Yes; 20; Q 4 hours; 40; 30; Q 4 hours  ADULT DIET;

## 2024-10-28 NOTE — PROGRESS NOTES
Spiritual Health History and Assessment/Progress Note  [facility]    Loneliness/Social Isolation,  ,  ,      Name: Tico Day MRN: [id number]    Age: 64 y.o.     Sex: male   Language: English   Jehovah's witness: None   Acute on chronic respiratory failure     Date: 10/28/2024            Total Time Calculated: 8 min              Spiritual Assessment began in [facility] ICU        Referral/Consult From: Rounding   Encounter Overview/Reason: Loneliness/Social Isolation  Service Provided For: Patient    Sybil, Belief, Meaning:   Patient unable to assess at this time  Family/Friends No family/friends present      Importance and Influence:  Patient unable to assess at this time  Family/Friends No family/friends present    Community:  Patient feels well-supported. Support system includes: Spouse/Partner  Family/Friends No family/friends present    Assessment and Plan of Care:     Patient Interventions include: Facilitated expression of thoughts and feelings  Family/Friends Interventions include: No family/friends present    Patient Plan of Care: Spiritual Care available upon further referral  Family/Friends Plan of Care: No family/friends present    Electronically signed by Chaplain Garret on 10/28/2024 at 5:03 PM

## 2024-10-28 NOTE — PROGRESS NOTES
Facility/Department: [facility]   CLINICAL BEDSIDE SWALLOW EVALUATION    NAME: Tico Day  : 1960  MRN: [id number]    ADMISSION DATE: 10/25/2024  ADMITTING DIAGNOSIS: has Acute on chronic respiratory failure and Moderate malnutrition (HCC) on their problem list.  ONSET DATE: this admission       Date of Eval: 10/28/2024  Evaluating Therapist: YONY Yin    Impression: Tico Day was seen today for a clinical bedside swallow evaluation following admission to [facility] with acute on chronic respiratory failure. Abdominal/pelvis CT on 10/26/24 reveals \"Small bilateral pleural effusions with adjacent consolidation/atelectasis. Right  lung base subcentimeter nodular opacities, incompletely visualized.\" Relevant medical hx includes ESRD on HD , chronic systolic heart failure, chronic respiratory failure with tracheostomy since , left atrial thrombus, wide-complex tachycardia, CAD with history of PCI in , COPD, anemia of chronic disease, obstructive sleep apnea, anxiety, chronic pain with opioid dependence. Pt receiving mechanical ventilation throughout visit.     Tico Day was positioned upright in bed for the current visit. Pt pleasant and cooperative throughout visit. Pt reports he has been consuming liquids only at the LTACH and he hasn't had much of an appetite. Pt expresses frustration that he is back on the vent, stating \"I worked so hard.\" Oral mechanism examination reveals decreased lingual ROM, but otherwise WFL. Vocal quality is aphonic d/t tracheostomy.    Tico Day consumed trials of ice chips x6 during this visit. Oral acceptance, bolus preparation/mastication, and oral clearance appear WFL. Laryngeal elevation appears functional. Pharyngeal swallow initiation appears timely initially, however, questionable delay as pt fatigues. No overt s/sx of penetration/aspiration noted. SLP provided education to pt and RN regarding results/recommendation

## 2024-10-28 NOTE — CONSULTS
[facility] Contin[facility] Nurse  Consult Note       Tico Day  AGE: 64 y.o.   GENDER: male  : 1960  TODAY'S DATE:  10/28/2024    Subjective:     Reason for CWOCN Evaluation and Assessment: wound assessment      Tico Day is a 64 y.o. male referred by:   [x] Physician  [] Nursing  [] Other:     Wound Identification:  Wound Type: pressure and MASD-intertriginous and IAD  Contributing Factors: chronic pressure, decreased mobility, obesity, decreased tissue oxygenation, malnutrition, incontinence of stool, and incontinence of urine        PAST MEDICAL HISTORY    History reviewed. No pertinent past medical history.    PAST SURGICAL HISTORY    History reviewed. No pertinent surgical history.    FAMILY HISTORY    History reviewed. No pertinent family history.    SOCIAL HISTORY    Social History     Tobacco Use    Smoking status: Unknown     Passive exposure: Past       ALLERGIES    Allergies   Allergen Reactions    Reglan [Metoclopramide]     Remeron [Mirtazapine]        MEDICATIONS    No current facility-administered medications on file prior to encounter.     Current Outpatient Medications on File Prior to Encounter   Medication Sig Dispense Refill    albuterol (PROVENTIL) (2.5 MG/3ML) 0.083% nebulizer solution Inhale 3 mLs into the lungs every 4 hours      atorvastatin (LIPITOR) 40 MG tablet 1 tablet by Enteral route nightly      loperamide (IMODIUM) 2 MG capsule Take 1 capsule by mouth as needed for Diarrhea      melatonin 3 MG TABS tablet Take 1 tablet by mouth nightly as needed (insomnia)      midodrine (PROAMATINE) 10 MG tablet Take 1 tablet by mouth as needed (Every monday, wednesday, and friday for hypotension)      oxyCODONE (ROXICODONE) 5 MG immediate release tablet 1 tablet by Enteral route every 8 hours as needed. Max Daily Amount: 15 mg      lansoprazole (PREVACID SOLUTAB) 30 MG disintegrating tablet 1 tablet by Enteral route daily      sennosides (SENOKOT) 8.8 MG/5ML syrup 5 mLs

## 2024-10-28 NOTE — PROGRESS NOTES
Patient wanted to be put back on full support. Vent changed to full support, settings below, RN notified.     10/28/24 1127   Patient Observation   Pulse 87   Respirations 29   SpO2 100 %   Vent Information   Vent Mode (S)  AC/VC   Ventilator Settings   FiO2  30 %   Vt (Set, mL) 450 mL   Resp Rate (Set) 16 bpm   PEEP/CPAP (cmH2O) 5   Vent Patient Data (Readings)   Vt (Measured) 320 mL   Peak Inspiratory Pressure (cmH2O) 26 cmH2O   Rate Measured 31 br/min   Minute Volume (L/min) 11.8 Liters   Mean Airway Pressure (cmH2O) 8.7 cmH20   Plateau Pressure (cm H2O) 0 cm H2O   Driving Pressure -5   I:E Ratio 1.10:1   Backup Apnea On   Backup Rate 16 Breaths Per Minute   Backup Vt 450   Vent Alarm Settings   High Pressure (cmH2O) 40 cmH2O   Low Minute Volume (lpm) 2.5 L/min   High Minute Volume (lpm) 20 L/min   Low Exhaled Vt (ml) 0.38 mL   High Exhaled Vt (ml) 1000 mL   RR High (bpm) 40 br/min   Apnea (secs) 20 secs   Additional Respiratoray Assessments   Humidification Source HME   Ambu Bag With Mask At Bedside Yes   Airway Clearance   Suction Trach   Subglottic Suction Done No   Suction Device Inline suction catheter   Sputum Method Obtained Tracheal   $Obtained Sample $Induced Sputum (charge not used for Bronchoscopy)

## 2024-10-28 NOTE — PROGRESS NOTES
Physician Progress Note      PATIENT:               ANAMARIA OROURKE  CSN #:                  [id number]  :                       1960  ADMIT DATE:       10/25/2024 10:22 PM  DISCH DATE:  RESPONDING  PROVIDER #:        Emily Ramirez MD          QUERY TEXT:    Pt admitted with respiratory distress and has malnutrition documented in RD   note on 10/27. Please further specify type of malnutrition with documentation   in the medical record.    The medical record reflects the following:  Risk Factors: 64 y.o male with pMHx of CHF, COPD, ESRD, ventilator dependence,   and tracheostomy  Clinical Indicators: 10/27 -  - Malnutrition Assessment:  Malnutrition Status:  Moderate malnutrition (10/27/24 1329)  Context:  Social/Environmental Circumstances  Findings of the 6 clinical characteristics of malnutrition:  Energy Intake:  Unable to assess (Pt was eating orally and receiving EN PTA)  Weight Loss:  No significant weight loss  Body Fat Loss:  Mild body fat loss Triceps, Orbital  Muscle Mass Loss:  Mild muscle mass loss Clavicles (pectoralis & deltoids),   Hand (interosseous)  Nutrition Assessment:  Admitted with acute on chronic respiratory failure. Hx ESRD on HD, anemia of   chronic disease, HF, DMII, CAD s/p stents x 2, trach/PEG tube. Currently on   pressure support, plans to wean vent settings. Minimal po data documented, pt   resting at visit. Observed pt with mild wasting in fat/muscle mass areas.   Meets criteria for malnutrition. H/o pt tolerating TF of Nepro 1.8 Audi formula   at 50 ml/hr, no formula hung or running at visit, unclear plan for nutrition.   Pt remains on Regular diet and standard supplements, will adjust. Recommend   SLP eval for swallow safety. Some wounds pr  Treatment: Rd consult, oral nutritional supplement, tube feeding adjustments    ASPEN Criteria:    https://aspenjournals.onlinelibrary.seymour.com/doi/full/10.1177/791205966742947  5  Options provided:  -- This patient has Moderate  Protein calorie malnutrition.  -- Other - I will add my own diagnosis  -- Disagree - Not applicable / Not valid  -- Disagree - Clinically unable to determine / Unknown  -- Refer to Clinical Documentation Reviewer    PROVIDER RESPONSE TEXT:    This patient has Moderate Protein calorie malnutrition.    Query created by: Federico Carcamo on 10/28/2024 8:10 AM      Electronically signed by:  Emily Ramirez MD 10/28/2024 9:11 AM

## 2024-10-28 NOTE — PROGRESS NOTES
Kady NAVARRETEN RN clinical  for [facility] RN students 07-19:00 this date.

## 2024-10-28 NOTE — DISCHARGE INSTR - COC
Continuity of Care Form    Patient Name: Tico Day   :  1960  MRN:  [id number]    Admit date:  10/25/2024  Discharge date:  21    Code Status Order: Full Code   Advance Directives:   Advance Care Flowsheet Documentation             Admitting Physician:  No admitting provider for patient encounter.  PCP: Carlos Nina MD    Discharging Nurse: Nitza SEE  Discharging Hospital Unit/Room#: 2122/2122-A  Discharging Unit Phone Number: [phone]    Emergency Contact:   Extended Emergency Contact Information  Primary Emergency Contact: Alesia Day  Mobile Phone: [phone]  Relation: Spouse    Past Surgical History:  History reviewed. No pertinent surgical history.    Immunization History:   Immunization History   Administered Date(s) Administered    COVID-19, PFIZER PURPLE top, DILUTE for use, (age 12 y+), 30mcg/0.3mL 2021, 2021       Active Problems:  Patient Active Problem List   Diagnosis Code    Acute on chronic respiratory failure J96.20    Moderate malnutrition (HCC) E44.0       Isolation/Infection:   Isolation            Contact          Patient Infection Status       Infection Onset Added Last Indicated Last Indicated By Review Planned Expiration Resolved Resolved By    ESBL (Extended Spectrum Beta Lactamase) 04/30/24 10/28/24  Justice Weiner RN        Added from external infection. Source: [facility].  24: Added from external infection. Source: [facility]                       Nurse Assessment:  Last Vital Signs: BP (!) 119/58   Pulse 84   Temp 98.2 °F (36.8 °C) (Oral)   Resp 25   Ht 1.753 m (5' 9\")   Wt 99.6 kg (219 lb 9.3 oz)   SpO2 100%   BMI 32.43 kg/m²     Last documented pain score (0-10 scale): Pain Level: 9  Last Weight:   Wt Readings from Last 1 Encounters:   10/28/24 99.6 kg (219 lb 9.3 oz)     Mental Status:  oriented, alert, coherent, and able to concentrate and follow conversation    IV Access:  - None    Nursing    Undermining Starts ___ O'Clock 0 10/28/24 1150   Undermining Ends___ O'Clock 0 10/28/24 1150   Wound Assessment Pink/red 10/28/24 1150   Drainage Amount Small (< 25%) 10/28/24 1150   Drainage Description Serosanguinous 10/28/24 1150   Odor None 10/28/24 1150   Suzette-wound Assessment Intact 10/28/24 1150   Margins Defined edges 10/28/24 1150   Wound Thickness Description not for Pressure Injury Partial thickness 10/28/24 1150   Number of days: 2       Wound 10/26/24 Abdomen Lower;Right (Active)   Wound Image   10/28/24 1150   Wound Etiology Other 10/28/24 1150   Dressing Status New dressing applied 10/28/24 1150   Wound Cleansed Cleansed with saline 10/28/24 1150   Dressing/Treatment Interdry Ag/wicking fabric with Ag 10/28/24 1150   Wound Length (cm) 1 cm 10/28/24 1150   Wound Width (cm) 4 cm 10/28/24 1150   Wound Depth (cm) 0.1 cm 10/28/24 1150   Wound Surface Area (cm^2) 4 cm^2 10/28/24 1150   Wound Volume (cm^3) 0.4 cm^3 10/28/24 1150   Distance Tunneling (cm) 0 cm 10/28/24 1150   Tunneling Position ___ O'Clock 0 10/28/24 1150   Undermining Starts ___ O'Clock 0 10/28/24 1150   Undermining Ends___ O'Clock 0 10/28/24 1150   Undermining Maxium Distance (cm) 0 10/28/24 1150   Wound Assessment Pink/red 10/28/24 1150   Drainage Amount Small (< 25%) 10/28/24 1150   Drainage Description Serosanguinous 10/28/24 1150   Odor None 10/28/24 1150   Suzette-wound Assessment Intact 10/28/24 1150   Margins Defined edges 10/28/24 1150   Wound Thickness Description not for Pressure Injury Partial thickness 10/28/24 1150   Number of days: 2        Elimination:  Continence:   Bowel: No  Bladder: No  Urinary Catheter:  external    Colostomy/Ileostomy/Ileal Conduit: No       Date of Last BM: 11/8/21    Intake/Output Summary (Last 24 hours) at 10/28/2024 1630  Last data filed at 10/27/2024 1800  Gross per 24 hour   Intake --   Output 200 ml   Net -200 ml     I/O last 3 completed shifts:  In: -   Out: 1100 [Urine:1100]    Safety Concerns:

## 2024-10-29 LAB
ALBUMIN SERPL-MCNC: 2.2 G/DL (ref 3.4–5)
ALBUMIN/GLOB SERPL: 0.5 {RATIO} (ref 1.1–2.2)
ALP SERPL-CCNC: 151 U/L (ref 40–129)
ALT SERPL-CCNC: <5 U/L (ref 10–40)
ANION GAP SERPL CALCULATED.3IONS-SCNC: 4 MMOL/L (ref 9–17)
AST SERPL-CCNC: 21 U/L (ref 15–37)
BILIRUB SERPL-MCNC: 0.4 MG/DL (ref 0–1)
BUN SERPL-MCNC: 18 MG/DL (ref 7–20)
CALCIUM SERPL-MCNC: 11.3 MG/DL (ref 8.3–10.6)
CHLORIDE SERPL-SCNC: 97 MMOL/L (ref 99–110)
CO2 SERPL-SCNC: 28 MMOL/L (ref 21–32)
CREAT SERPL-MCNC: 2.5 MG/DL (ref 0.8–1.3)
CRP SERPL HS-MCNC: 120 MG/L (ref 0–5)
ERYTHROCYTE [DISTWIDTH] IN BLOOD BY AUTOMATED COUNT: 17.5 % (ref 11.7–14.9)
GFR, ESTIMATED: 26 ML/MIN/1.73M2
GLUCOSE SERPL-MCNC: 85 MG/DL (ref 74–99)
HCT VFR BLD AUTO: 23.1 % (ref 42–52)
HCT VFR BLD AUTO: 26.1 % (ref 42–52)
HGB BLD-MCNC: 7.1 G/DL (ref 13.5–18)
HGB BLD-MCNC: 7.9 G/DL (ref 13.5–18)
LACTATE BLDV-SCNC: 0.6 MMOL/L (ref 0.4–2)
MCH RBC QN AUTO: 28.5 PG (ref 27–31)
MCHC RBC AUTO-ENTMCNC: 30.7 G/DL (ref 32–36)
MCV RBC AUTO: 92.8 FL (ref 78–100)
PLATELET # BLD AUTO: 145 K/UL (ref 140–440)
PMV BLD AUTO: 8.7 FL (ref 7.5–11.1)
POTASSIUM SERPL-SCNC: 4.3 MMOL/L (ref 3.5–5.1)
PROT SERPL-MCNC: 6.6 G/DL (ref 6.4–8.2)
RBC # BLD AUTO: 2.49 M/UL (ref 4.6–6.2)
SODIUM SERPL-SCNC: 129 MMOL/L (ref 136–145)
WBC OTHER # BLD: 4 K/UL (ref 4–10.5)

## 2024-10-29 PROCEDURE — 85014 HEMATOCRIT: CPT

## 2024-10-29 PROCEDURE — 6360000002 HC RX W HCPCS: Performed by: STUDENT IN AN ORGANIZED HEALTH CARE EDUCATION/TRAINING PROGRAM

## 2024-10-29 PROCEDURE — 86901 BLOOD TYPING SEROLOGIC RH(D): CPT

## 2024-10-29 PROCEDURE — 86900 BLOOD TYPING SEROLOGIC ABO: CPT

## 2024-10-29 PROCEDURE — 2060000000 HC ICU INTERMEDIATE R&B

## 2024-10-29 PROCEDURE — 80053 COMPREHEN METABOLIC PANEL: CPT

## 2024-10-29 PROCEDURE — 86140 C-REACTIVE PROTEIN: CPT

## 2024-10-29 PROCEDURE — 85027 COMPLETE CBC AUTOMATED: CPT

## 2024-10-29 PROCEDURE — 6360000002 HC RX W HCPCS: Performed by: INTERNAL MEDICINE

## 2024-10-29 PROCEDURE — 94640 AIRWAY INHALATION TREATMENT: CPT

## 2024-10-29 PROCEDURE — 99223 1ST HOSP IP/OBS HIGH 75: CPT | Performed by: INTERNAL MEDICINE

## 2024-10-29 PROCEDURE — APPSS60 APP SPLIT SHARED TIME 46-60 MINUTES: Performed by: NURSE PRACTITIONER

## 2024-10-29 PROCEDURE — 83605 ASSAY OF LACTIC ACID: CPT

## 2024-10-29 PROCEDURE — P9016 RBC LEUKOCYTES REDUCED: HCPCS

## 2024-10-29 PROCEDURE — 2580000003 HC RX 258: Performed by: PREVENTIVE MEDICINE

## 2024-10-29 PROCEDURE — 2700000000 HC OXYGEN THERAPY PER DAY

## 2024-10-29 PROCEDURE — 94761 N-INVAS EAR/PLS OXIMETRY MLT: CPT

## 2024-10-29 PROCEDURE — 94003 VENT MGMT INPAT SUBQ DAY: CPT

## 2024-10-29 PROCEDURE — 92526 ORAL FUNCTION THERAPY: CPT

## 2024-10-29 PROCEDURE — 36430 TRANSFUSION BLD/BLD COMPNT: CPT

## 2024-10-29 PROCEDURE — 6370000000 HC RX 637 (ALT 250 FOR IP): Performed by: INTERNAL MEDICINE

## 2024-10-29 PROCEDURE — 6370000000 HC RX 637 (ALT 250 FOR IP): Performed by: STUDENT IN AN ORGANIZED HEALTH CARE EDUCATION/TRAINING PROGRAM

## 2024-10-29 PROCEDURE — 89220 SPUTUM SPECIMEN COLLECTION: CPT

## 2024-10-29 PROCEDURE — 85018 HEMOGLOBIN: CPT

## 2024-10-29 PROCEDURE — 86920 COMPATIBILITY TEST SPIN: CPT

## 2024-10-29 PROCEDURE — 86850 RBC ANTIBODY SCREEN: CPT

## 2024-10-29 PROCEDURE — 2580000003 HC RX 258: Performed by: STUDENT IN AN ORGANIZED HEALTH CARE EDUCATION/TRAINING PROGRAM

## 2024-10-29 RX ORDER — LORAZEPAM 1 MG/1
1 TABLET ORAL EVERY 4 HOURS PRN
Status: DISCONTINUED | OUTPATIENT
Start: 2024-10-29 | End: 2024-11-08 | Stop reason: HOSPADM

## 2024-10-29 RX ORDER — SODIUM CHLORIDE 9 MG/ML
INJECTION, SOLUTION INTRAVENOUS PRN
Status: DISCONTINUED | OUTPATIENT
Start: 2024-10-29 | End: 2024-11-08 | Stop reason: HOSPADM

## 2024-10-29 RX ORDER — SODIUM CHLORIDE, SODIUM LACTATE, POTASSIUM CHLORIDE, AND CALCIUM CHLORIDE .6; .31; .03; .02 G/100ML; G/100ML; G/100ML; G/100ML
500 INJECTION, SOLUTION INTRAVENOUS ONCE
Status: COMPLETED | OUTPATIENT
Start: 2024-10-29 | End: 2024-10-29

## 2024-10-29 RX ADMIN — TRAZODONE HYDROCHLORIDE 50 MG: 50 TABLET ORAL at 21:03

## 2024-10-29 RX ADMIN — ALPRAZOLAM 0.5 MG: 0.5 TABLET ORAL at 16:44

## 2024-10-29 RX ADMIN — ALPRAZOLAM 0.5 MG: 0.5 TABLET ORAL at 10:30

## 2024-10-29 RX ADMIN — ALBUTEROL SULFATE 2.5 MG: 2.5 SOLUTION RESPIRATORY (INHALATION) at 16:03

## 2024-10-29 RX ADMIN — LANSOPRAZOLE 30 MG: 30 TABLET, ORALLY DISINTEGRATING ORAL at 07:21

## 2024-10-29 RX ADMIN — HEPARIN SODIUM 5000 UNITS: 5000 INJECTION INTRAVENOUS; SUBCUTANEOUS at 21:03

## 2024-10-29 RX ADMIN — PIPERACILLIN AND TAZOBACTAM 3375 MG: 3; .375 INJECTION, POWDER, LYOPHILIZED, FOR SOLUTION INTRAVENOUS at 11:42

## 2024-10-29 RX ADMIN — ALBUTEROL SULFATE 2.5 MG: 2.5 SOLUTION RESPIRATORY (INHALATION) at 11:37

## 2024-10-29 RX ADMIN — OXYCODONE HYDROCHLORIDE 5 MG: 5 TABLET ORAL at 16:43

## 2024-10-29 RX ADMIN — ALBUTEROL SULFATE 2.5 MG: 2.5 SOLUTION RESPIRATORY (INHALATION) at 23:10

## 2024-10-29 RX ADMIN — PIPERACILLIN AND TAZOBACTAM 3375 MG: 3; .375 INJECTION, POWDER, LYOPHILIZED, FOR SOLUTION INTRAVENOUS at 00:36

## 2024-10-29 RX ADMIN — ALBUTEROL SULFATE 2.5 MG: 2.5 SOLUTION RESPIRATORY (INHALATION) at 19:46

## 2024-10-29 RX ADMIN — ALPRAZOLAM 0.5 MG: 0.5 TABLET ORAL at 01:07

## 2024-10-29 RX ADMIN — GABAPENTIN 100 MG: 100 CAPSULE ORAL at 07:21

## 2024-10-29 RX ADMIN — LORAZEPAM 1 MG: 1 TABLET ORAL at 21:03

## 2024-10-29 RX ADMIN — SODIUM CHLORIDE, POTASSIUM CHLORIDE, SODIUM LACTATE AND CALCIUM CHLORIDE 500 ML: 600; 310; 30; 20 INJECTION, SOLUTION INTRAVENOUS at 04:50

## 2024-10-29 RX ADMIN — IRON SUCROSE 200 MG: 20 INJECTION, SOLUTION INTRAVENOUS at 18:06

## 2024-10-29 RX ADMIN — ALBUTEROL SULFATE 2.5 MG: 2.5 SOLUTION RESPIRATORY (INHALATION) at 03:20

## 2024-10-29 RX ADMIN — MICONAZOLE NITRATE: 2 POWDER TOPICAL at 07:23

## 2024-10-29 RX ADMIN — MIDODRINE HYDROCHLORIDE 10 MG: 5 TABLET ORAL at 18:06

## 2024-10-29 RX ADMIN — HEPARIN SODIUM 5000 UNITS: 5000 INJECTION INTRAVENOUS; SUBCUTANEOUS at 07:22

## 2024-10-29 RX ADMIN — MIDODRINE HYDROCHLORIDE 10 MG: 5 TABLET ORAL at 07:21

## 2024-10-29 RX ADMIN — MICONAZOLE NITRATE: 2 POWDER TOPICAL at 21:03

## 2024-10-29 RX ADMIN — ATORVASTATIN CALCIUM 40 MG: 40 TABLET, FILM COATED ORAL at 21:03

## 2024-10-29 RX ADMIN — HEPARIN SODIUM 5000 UNITS: 5000 INJECTION INTRAVENOUS; SUBCUTANEOUS at 16:44

## 2024-10-29 RX ADMIN — ALBUTEROL SULFATE 2.5 MG: 2.5 SOLUTION RESPIRATORY (INHALATION) at 07:50

## 2024-10-29 ASSESSMENT — PULMONARY FUNCTION TESTS
PIF_VALUE: 26
PIF_VALUE: 20
PIF_VALUE: 36
PIF_VALUE: 22
PIF_VALUE: 32
PIF_VALUE: 22
PIF_VALUE: 15
PIF_VALUE: 19
PIF_VALUE: 16
PIF_VALUE: 15
PIF_VALUE: 18
PIF_VALUE: 19
PIF_VALUE: 22
PIF_VALUE: 20
PIF_VALUE: 21
PIF_VALUE: 23
PIF_VALUE: 26
PIF_VALUE: 19
PIF_VALUE: 16
PIF_VALUE: 19
PIF_VALUE: 35
PIF_VALUE: 20
PIF_VALUE: 15
PIF_VALUE: 20
PIF_VALUE: 32
PIF_VALUE: 15

## 2024-10-29 ASSESSMENT — PAIN SCALES - GENERAL
PAINLEVEL_OUTOF10: 6
PAINLEVEL_OUTOF10: 5
PAINLEVEL_OUTOF10: 0
PAINLEVEL_OUTOF10: 4
PAINLEVEL_OUTOF10: 8

## 2024-10-29 ASSESSMENT — PAIN DESCRIPTION - LOCATION: LOCATION: BACK

## 2024-10-29 ASSESSMENT — PAIN DESCRIPTION - DESCRIPTORS: DESCRIPTORS: ACHING;THROBBING

## 2024-10-29 ASSESSMENT — PAIN - FUNCTIONAL ASSESSMENT: PAIN_FUNCTIONAL_ASSESSMENT: ACTIVITIES ARE NOT PREVENTED

## 2024-10-29 ASSESSMENT — PAIN DESCRIPTION - ORIENTATION: ORIENTATION: MID;LOWER

## 2024-10-29 NOTE — PROGRESS NOTES
Nephrology Progress Note        [address], [address]  [place] [address]  Phone: [phone]  Office Hours: 8:30AM - 4:30PM  Monday - Friday        10/29/2024 7:56 AM  Subjective:   Admit Date: 10/25/2024  PCP: Carlos Nina MD  Interval History:   On MV  Receiving a NS bolus as BP was low    Diet: ADULT TUBE FEEDING; PEG; Renal Formula; Continuous; 20; Yes; 20; Q 4 hours; 40; 30; Q 4 hours  ADULT DIET; Regular      Data:   Scheduled Meds:   epoetin gabriel-epbx  10,000 Units IntraVENous Once per day on Monday Wednesday Friday    piperacillin-tazobactam  3,375 mg IntraVENous Q12H    miconazole   Topical BID    ALPRAZolam  0.5 mg PEG Tube Once    atorvastatin  40 mg Oral Nightly    gabapentin  100 mg Per G Tube Daily    lansoprazole  30 mg Oral QAM AC    traZODone  50 mg Oral Nightly    midodrine  10 mg Oral TID WC    heparin (porcine)  5,000 Units SubCUTAneous 3 times per day    albuterol  2.5 mg Nebulization Q4H     Continuous Infusions:   dextrose       PRN Meds:glucose, dextrose bolus **OR** dextrose bolus, glucagon (rDNA), dextrose, ipratropium 0.5 mg-albuterol 2.5 mg, ALPRAZolam, oxyCODONE, sodium chloride (Inhalant)  I/O last 3 completed shifts:  In: 1592.4 [I.V.:241.1; Other:240; NG/GT:354; IV Piggyback:257.3]  Out: 2000   I/O this shift:  In: 195 [NG/GT:195]  Out: 50 [Urine:50]    Intake/Output Summary (Last 24 hours) at 10/29/2024 0756  Last data filed at 10/29/2024 0730  Gross per 24 hour   Intake 1787.41 ml   Output 2050 ml   Net -262.59 ml       CBC:   Recent Labs     10/27/24  0610 10/28/24  0635 10/29/24  0330   WBC 10.0 4.7 4.0   HGB 8.0* 7.6* 7.1*    152 145       BMP:    Recent Labs     10/27/24  0535 10/28/24  0635 10/29/24  0330   * 131* 129*   K 4.9 5.0 4.3   CL 97* 97* 97*   CO2 26 26 28   BUN 22* 28* 18   CREATININE 2.8* 3.4* 2.5*   GLUCOSE 37* 62* 85   CALCIUM 11.4* 11.5* 11.3*     Hepatic:   Recent Labs     10/27/24  0535 10/28/24  0635 10/29/24  0330   AST 20  23 21   ALT 6* 5* <5*   BILITOT 0.5 0.5 0.4   ALKPHOS 155* 159* 151*         Objective:   Vitals: BP (!) 83/46   Pulse 75   Temp 98.2 °F (36.8 °C) (Oral)   Resp 20   Ht 1.753 m (5' 9\")   Wt 99.3 kg (218 lb 14.7 oz)   SpO2 96%   BMI 32.33 kg/m²   General appearance:  in no acute distress  HEENT: normocephalic, atraumatic,   Neck: supple, trachea midline  Lungs: breathing comfortably on MV  Heart:: regular rate and rhythm,  Abdomen:PEG tube  Extremities: extremities atraumatic, no cyanosis or edema  Neurologic: drowsy    MEDICAL DECISION MAKING       Patient Active Problem List    Diagnosis Date Noted    Moderate malnutrition (HCC) 10/27/2024    Acute on chronic respiratory failure 10/26/2024     -Renal Function Monitoring: stable  -Dialysis Status: MWF hemodialysis so HD planned tomorrow  -Blood Pressure Management : low normal range  -Volume Status: mild   -Electrolytes Na 129 today  -Acid/Base: normal  -Mineral/Bone Disease Management : calcium is high today so no calcitriol nor calcium tabs needed  -Anemia Management; on retacrit with dialysis   -Reason for admission; acute hypoxic resp failure                      Electronically signed by Mike Hoskins DO on 10/29/2024 at 7:56 AM    [facility]  MD KAYLEE ARRINGTON DO  [address],  [address]  [place] [address]  PHONE: [phone]  FAX: [phone]

## 2024-10-29 NOTE — PROGRESS NOTES
[facility]  DEPARTMENT OF SPEECH/LANGUAGE PATHOLOGY  DAILY PROGRESS NOTE  Tico Day  10/29/2024  [id number]  ESRD (end stage renal disease) ([facility]) [N18.6]  Acute on chronic respiratory failure [J96.20]  Acute on chronic hypoxic respiratory failure [J96.21]  Allergies   Allergen Reactions    Reglan [Metoclopramide]     Remeron [Mirtazapine]          Pt was seen this date for dysphagia treatment.       IMPRESSION AND RECOMMENDATIONS: Tico Day was seen today for dysphagia tx and ongoing education following admission to [facility] with acute on chronic respiratory failure. Pt seen for a fiberoptic endoscopic evaluation of swallowing yesterday 10/29/24 with the following results: Oral stage WFL with intact labial seal, timely oral prep/transit and complete clearance. Pharyngeal stage WFL with intact airway protection and functional bolus efficiency. No episodes of laryngeal penetration or aspiration were identified. Minimal residue identified following trials of thin liquids, trace following trials of ice chips/nectar thick liquids/puree, and none following trial of solids; residue was noted within the valleculae, lateral channels, and pyriform sinuses, and spontaneously cleared with continued trials. SLP recommended a regular/thin liquid diet following study.     Tico Day was positioned upright in bed for the current visit. Pt agreeable to limited trials with SLP and pleasant throughout. Pt on full vent support. He states \"I had a breakdown this morning\" and requested to be placed on full support. Tico Day consumed trials of thin liquid via straw and regular solid during this visit. Oral acceptance, bolus preparation/mastication, a/p transit, and oral clearance are WFL. Pt with reduced oral acceptance. Pharyngeal swallow initiation appears timely. Pt declined further PO trials. SLP provided education on results of FEES and recommendations moving forward. Pt indicates

## 2024-10-29 NOTE — PROGRESS NOTES
Pt very anxious at this time. Repeat suctioning per patient. RR in the 30's and HR in the low 100's. Dr. Harmon at bedside and notified. Called Shahnaz PARKS at this time to place pt back on full vent support.

## 2024-10-29 NOTE — PROGRESS NOTES
V2.0  [facility] Hospitalist Progress Note      Name:  Tico Day /Age/Sex: 1960  (64 y.o. male)   MRN & CSN:  [id number] & [id number] Encounter Date/Time: 10/29/2024 10:55 AM EDT    Location:  -A PCP: Carlos Nina MD       Hospital Day: 5    Assessment and Plan:   Tico Day is a 64 y.o. male with pmh of  ESRD on HD , chronic systolic heart failure, chronic respiratory failure with tracheostomy since , left atrial thrombus, wide-complex tachycardia, CAD with history of PCI in , COPD, anemia of chronic disease, obstructive sleep apnea, anxiety, chronic pain with opioid dependence  who presents with Acute on chronic respiratory failure      Plan:  Tracheostomy dependence  Acute on chronic respiratory failure with hypoxia requiring ventilatory support  Secondary to acute on chronic systolic heart failure, possible pneumonia  Chest x-ray personally reviewed bilateral pulmonary infiltrates R>L and cardiomegaly Pro-BNP 43,703.  Last echocardiogram from 2024 reviewed EF of 40%.  Negative respiratory viral panel  monitor intake and output measure daily weight  Nephrology consultation for inpatient management of HD and volume management  Sputum culture growing Pseudomonas aeruginosa sensitive to Zosyn switched from meropenem to Zosyn; ID team consulted - plan for 2 weeks total coverage, continue zosyn - end date 2024  Not tolerating pressure support ventilation     ESRD on HD   Nephrology consultation for inpatient management of hemodialysis     Elevated troponin secondary to demand ischemia  Coronary artery disease with history of PCI in   EKG 94/min right bundle branch block normal sinus rhythm  Historically unable to tolerate antiplatelet therapy due to recurrent GI bleed  Continue home Lipitor     Anemia of chronic disease  Stable Hb trend, no evidence of active bleeding  Monitor CBC     Obesity/BMI 31.6    Diet ADULT

## 2024-10-29 NOTE — PROGRESS NOTES
Pt is very anxious again. Not tolerating vent and requiring frequent suctioning. Notified Shahnaz PARKS. PRN medications given.

## 2024-10-29 NOTE — PROGRESS NOTES
Patient on SBT, CPAP/PS of 10. Tolerating well. RN notified.     10/29/24 0754   Patient Observation   Pulse 75   Respirations 20   SpO2 96 %   Vent Information   Vent Mode (S)  CPAP/PS   Ventilator Settings   FiO2  30 %   PEEP/CPAP (cmH2O) 5   Pressure Support (cm H2O) (S)  10 cm H2O   Vent Patient Data (Readings)   Vt (Measured) 444 mL   Peak Inspiratory Pressure (cmH2O) 15 cmH2O   Rate Measured 19 br/min   Minute Volume (L/min) 8.26 Liters   Mean Airway Pressure (cmH2O) 8.4 cmH20   Plateau Pressure (cm H2O) 0 cm H2O   Driving Pressure -5   I:E Ratio 1:2.20   Backup Apnea On   Backup Rate 16 Breaths Per Minute   Backup Vt 450   Vent Alarm Settings   High Pressure (cmH2O) 40 cmH2O   Low Minute Volume (lpm) 2.5 L/min   High Minute Volume (lpm) 20 L/min   Low Exhaled Vt (ml) 0.44 mL   High Exhaled Vt (ml) 1000 mL   RR High (bpm) 40 br/min   Apnea (secs) 20 secs

## 2024-10-29 NOTE — PROGRESS NOTES
Ed Sapp NP, made aware of patients blood pressure being low. Patient is not symptomatic and wakes up and can answer questions at this time.

## 2024-10-29 NOTE — PROGRESS NOTES
Spoke with pt wife Alesia. She states that after every dialysis treatment pt BP seems to drop and he is more anxious. Notified her that patient has had multiple rounds of anxiety today requiring medications. She states that he is baseline very anxious but it seems to get worse the day after dialysis. Update given at this time. All questions answered.

## 2024-10-29 NOTE — CONSULTS
Infectious Disease Consult Note  10/29/2024   Patient Name: Tico Day : 1960   Impression  Pseudomonas aeruginosa Pneumonia:  Acute on Chronic Hypoxic Respiratory Failure Requiring Mechanical Ventilator to Chronic Trach:  Continue IV Zosyn as difficult to say if ProBNP elevation in ESRD pt is causing the acute on chronic hypoxic resp failure or is it pneumonia but will treat for pneumonia.   No reported allergies to ABX. CrCl 35 on ESRD. T-max 99.6. No leukocytosis. Pro-BNP 43,703. Viral resp panel and MRSA by PCR negative.   10/25-BC 0/2 NGTD  10/26-Resp culture: Pseudomonas aeruginosa (pan sensi)    ESRD on HD (MWF):  Dr. Morales onboard  Decompensated HFrEF:  Multi-morbidity: per PMHx: ESRD on HD thrice weekly MWF, chronic HFrEF, chronic respiratory failure with tracheostomy since 2024, left atrial thrombus, wide-complex tachycardia, CAD s/p PCI , COPD, anemia of chronic disease, ALONDRA, anxiety and chronic pain with opioid dependence  Plan:  Continue IV Zosyn 3,375 mg tid, plan for a cumulative 14 day course (end date 2024)  Trend CRP and Pct, ordered    Thank you for allowing me to consult in the care of this patient.  ------------------------  REASON FOR CONSULT: Infective syndrome \"recurrent pseudomonas pneumonia vs colonization\"  Requested by: Archie Harmon  HPI:Patient is a 64 y.o.  male with a history of ESRD on HD thrice weekly MWF, chronic HFrEF, chronic respiratory failure with tracheostomy since 2024, left atrial thrombus, wide-complex tachycardia, CAD s/p PCI , COPD, anemia of chronic disease, ALONDRA, anxiety and chronic pain with opioid dependence who was admitted 10/25/2024 for further evaluation and management from a SNF with respiratory distress requiring ventilator support diagnosed with acute on chronic HFrEF and possible pneumonia. He presented with a T-max 99.6. No leukocytosis. Pro-BNP 43,703. Viral resp panel and MRSA by PCR negative. His PCXR showed

## 2024-10-29 NOTE — CONSENT
Informed Consent for Blood Component Transfusion Note    I have discussed with the patient the rationale for blood component transfusion; its benefits in treating or preventing fatigue, organ damage, or death; and its risk which includes mild transfusion reactions, rare risk of blood borne infection, or more serious but rare reactions. I have discussed the alternatives to transfusion, including the risk and consequences of not receiving transfusion. The patient had an opportunity to ask questions and had agreed to proceed with transfusion of blood components.    Electronically signed by Archie Harmon MD on 10/29/24 at 11:18 AM EDT

## 2024-10-29 NOTE — PROGRESS NOTES
Comprehensive Nutrition Assessment    Type and Reason for Visit:  Reassess    Nutrition Recommendations/Plan:   Continue current tube feeding with renal formula, nepro, at 40 ml/hr continuous  Diet consistency as per speech therapy   Please document all PO intakes in I/O    Will continue to follow up during stay      Malnutrition Assessment:  Malnutrition Status:  Moderate malnutrition (10/27/24 1329)    Context:  Social/Environmental Circumstances     Findings of the 6 clinical characteristics of malnutrition:  Energy Intake:  Unable to assess (Pt was eating orally and receiving EN PTA)  Weight Loss:  No significant weight loss     Body Fat Loss:  Mild body fat loss Triceps, Orbital   Muscle Mass Loss:  Mild muscle mass loss Clavicles (pectoralis & deltoids), Hand (interosseous)  Fluid Accumulation:  Unable to assess Extremities   Strength:  Not Performed    Nutrition Assessment:    Able to resume regular diet yesterday per speech therapy after FEES, no po data as yet. Continues to receive renal formula (nepro) tube feeding via PEG to meet more than 75% of minimum estimated calorie and protein needs. Will continue to follow at high nutrition risk at this time with likely need to continue tube feeding as primary nutrition source.    Nutrition Related Findings:    asleep in bed, tube feeding running  hx ESRD on HD   trach dependent Wound Type: Stage II, Pressure Injury       Current Nutrition Intake & Therapies:    Average Meal Intake: Unable to assess  Average Supplements Intake: None Ordered  ADULT TUBE FEEDING; PEG; Renal Formula; Continuous; 20; Yes; 20; Q 4 hours; 40; 30; Q 4 hours  ADULT DIET; Regular  Current Tube Feeding (TF) Orders:  Feeding Route: PEG  Formula: Renal Formula  Schedule: Continuous  Feeding Regimen: 40 ml/hr  Additives/Modulars: None  Water Flushes: 30 ml every 4 hours  Current TF & Flush Orders Provides: 1728 calories, 77 g protein, 877 ml free water in TF and flushes  Goal TF & Flush

## 2024-10-29 NOTE — CARE COORDINATION
Cm in to see pt. Pt very anxious and Cm need to call for the nurse. Pt is vent and trach dependent and admitted to [facility] from [facility]. Cm will follow for anticipate need for return to [facility] upon discharge.    1200 CM spoke with [facility] Vent admissions. Pt was admitted to [facility] 10/24/24 from [facility]. Pt was at [facility] 8/28/24-10/24/24. Per [facility] Vent admissions pt was only on 24/7 trach collar for 3 days before he admitted to the NH. Pt is now on vent settings. Pt is also on HD. Plan will be for return to [facility] when medically stable.    1430 Cm contacted pt's wife to discuss discharge planning. Wife is quite distraught and distrusting of the healthcare system. Pt has been to [facility] and [facility] in the past. Pt blames one of those facilities for pt regino a serious infection. 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 Wife states that she is 9 yrs younger than pt and states that she is not well. Cm discussed with pt that unfortunately when a pt need trach/vent and HD the SNF options are extremely limited and pt does not want return to [facility] or [facility]. [facility] accepts vents but not in house HD. Cm discussed with wife the significance of pt illnesses and care he requires. Wife states that she feels like she cannot trust anyone. Cm reassured wife that SNFs are working hard to provide the best service that they can and have in CM experience been very responsive to addressing any concerns a pt or family might have. CM encouraged wife to pursue supportive counseling through a pastoral care or Counseling agency. Wife also reports that she is on medication to help anxiety but despite that seems under great stress. Cm also encouraged wife to continue advocating for pt and to also discuss/check in with him periodically to make sure that she is following his most current health care

## 2024-10-29 NOTE — PLAN OF CARE
Problem: Safety - Adult  Goal: Free from fall injury  Outcome: Progressing     Problem: Discharge Planning  Goal: Discharge to home or other facility with appropriate resources  Outcome: Progressing     Problem: Skin/Tissue Integrity  Goal: Absence of new skin breakdown  Description: 1.  Monitor for areas of redness and/or skin breakdown  2.  Assess vascular access sites hourly  3.  Every 4-6 hours minimum:  Change oxygen saturation probe site  4.  Every 4-6 hours:  If on nasal continuous positive airway pressure, respiratory therapy assess nares and determine need for appliance change or resting period.  Outcome: Progressing     Problem: Pain  Goal: Verbalizes/displays adequate comfort level or baseline comfort level  Outcome: Progressing     Problem: Nutrition Deficit:  Goal: Optimize nutritional status  Outcome: Progressing     Problem: Safety - Adult  Goal: Free from fall injury  10/29/2024 0311 by Andreea Benson RN  Outcome: Progressing  10/29/2024 0310 by Andreea Benson RN  Outcome: Progressing     Problem: Discharge Planning  Goal: Discharge to home or other facility with appropriate resources  10/29/2024 0311 by Andreea Benson RN  Outcome: Progressing  10/29/2024 0310 by Andreea Benson RN  Outcome: Progressing     Problem: Skin/Tissue Integrity  Goal: Absence of new skin breakdown  Description: 1.  Monitor for areas of redness and/or skin breakdown  2.  Assess vascular access sites hourly  3.  Every 4-6 hours minimum:  Change oxygen saturation probe site  4.  Every 4-6 hours:  If on nasal continuous positive airway pressure, respiratory therapy assess nares and determine need for appliance change or resting period.  10/29/2024 0311 by Andreea Benson RN  Outcome: Progressing  10/29/2024 0310 by Andreea Benson RN  Outcome: Progressing     Problem: Pain  Goal: Verbalizes/displays adequate comfort level or baseline comfort level  10/29/2024 0311 by Andreea Benson RN  Outcome:

## 2024-10-29 NOTE — PLAN OF CARE
Problem: Safety - Adult  Goal: Free from fall injury  10/29/2024 0713 by Katie Landis RN  Outcome: Progressing  10/29/2024 0311 by Andreea Benson RN  Outcome: Progressing  10/29/2024 0310 by Andreea Benson RN  Outcome: Progressing     Problem: Discharge Planning  Goal: Discharge to home or other facility with appropriate resources  10/29/2024 0713 by Katie Landis RN  Outcome: Progressing  10/29/2024 0311 by Andreea Benson RN  Outcome: Progressing  10/29/2024 0310 by Andreea Benson RN  Outcome: Progressing     Problem: Skin/Tissue Integrity  Goal: Absence of new skin breakdown  Description: 1.  Monitor for areas of redness and/or skin breakdown  2.  Assess vascular access sites hourly  3.  Every 4-6 hours minimum:  Change oxygen saturation probe site  4.  Every 4-6 hours:  If on nasal continuous positive airway pressure, respiratory therapy assess nares and determine need for appliance change or resting period.  10/29/2024 0713 by Katie Landis RN  Outcome: Progressing  10/29/2024 0311 by Andreea Benson RN  Outcome: Progressing  10/29/2024 0310 by Andreea Benson RN  Outcome: Progressing     Problem: Pain  Goal: Verbalizes/displays adequate comfort level or baseline comfort level  10/29/2024 0713 by Katie Landis RN  Outcome: Progressing  10/29/2024 0311 by Andreea Bneson RN  Outcome: Progressing  10/29/2024 0310 by Andreea Benson RN  Outcome: Progressing     Problem: Nutrition Deficit:  Goal: Optimize nutritional status  10/29/2024 0713 by Katie Landis RN  Outcome: Progressing  10/29/2024 0311 by Andreea Benson RN  Outcome: Progressing  10/29/2024 0310 by Andreea Benson RN  Outcome: Progressing     Problem: Neurosensory - Adult  Goal: Achieves maximal functionality and self care  10/29/2024 0713 by Katie Landis RN  Outcome: Progressing  10/29/2024 0311 by Andreea Benson RN  Outcome: Progressing     Problem: Respiratory - Adult  Goal: Achieves optimal

## 2024-10-30 PROBLEM — J15.1 PNEUMONIA OF BOTH LOWER LOBES DUE TO PSEUDOMONAS SPECIES (HCC): Status: ACTIVE | Noted: 2024-10-30

## 2024-10-30 PROBLEM — T80.219A CENTRAL LINE INFECTION: Status: ACTIVE | Noted: 2024-10-30

## 2024-10-30 PROBLEM — J96.21 ACUTE ON CHRONIC HYPOXIC RESPIRATORY FAILURE: Status: ACTIVE | Noted: 2024-10-30

## 2024-10-30 LAB
ABO/RH: NORMAL
ANTIBODY SCREEN: NEGATIVE
BLOOD BANK BLOOD PRODUCT EXPIRATION DATE: NORMAL
BLOOD BANK DISPENSE STATUS: NORMAL
BLOOD BANK ISBT PRODUCT BLOOD TYPE: 6200
BLOOD BANK PRODUCT CODE: NORMAL
BLOOD BANK SAMPLE EXPIRATION: NORMAL
BLOOD BANK UNIT TYPE AND RH: NORMAL
BPU ID: NORMAL
COMPONENT: NORMAL
CROSSMATCH RESULT: NORMAL
CRP SERPL HS-MCNC: 106 MG/L (ref 0–5)
MICROORGANISM SPEC CULT: NORMAL
MICROORGANISM SPEC CULT: NORMAL
PROCALCITONIN SERPL-MCNC: 2.63 NG/ML
SERVICE CMNT-IMP: NORMAL
SERVICE CMNT-IMP: NORMAL
SPECIMEN DESCRIPTION: NORMAL
SPECIMEN DESCRIPTION: NORMAL
TRANSFUSION STATUS: NORMAL
UNIT DIVISION: 0
UNIT ISSUE DATE/TIME: NORMAL

## 2024-10-30 PROCEDURE — 6370000000 HC RX 637 (ALT 250 FOR IP): Performed by: STUDENT IN AN ORGANIZED HEALTH CARE EDUCATION/TRAINING PROGRAM

## 2024-10-30 PROCEDURE — 89220 SPUTUM SPECIMEN COLLECTION: CPT

## 2024-10-30 PROCEDURE — 6370000000 HC RX 637 (ALT 250 FOR IP): Performed by: INTERNAL MEDICINE

## 2024-10-30 PROCEDURE — 84145 PROCALCITONIN (PCT): CPT

## 2024-10-30 PROCEDURE — 6360000002 HC RX W HCPCS: Performed by: INTERNAL MEDICINE

## 2024-10-30 PROCEDURE — 94640 AIRWAY INHALATION TREATMENT: CPT

## 2024-10-30 PROCEDURE — 90935 HEMODIALYSIS ONE EVALUATION: CPT

## 2024-10-30 PROCEDURE — 2700000000 HC OXYGEN THERAPY PER DAY

## 2024-10-30 PROCEDURE — 6360000002 HC RX W HCPCS: Performed by: STUDENT IN AN ORGANIZED HEALTH CARE EDUCATION/TRAINING PROGRAM

## 2024-10-30 PROCEDURE — 86140 C-REACTIVE PROTEIN: CPT

## 2024-10-30 PROCEDURE — 94003 VENT MGMT INPAT SUBQ DAY: CPT

## 2024-10-30 PROCEDURE — 2060000000 HC ICU INTERMEDIATE R&B

## 2024-10-30 PROCEDURE — 36415 COLL VENOUS BLD VENIPUNCTURE: CPT

## 2024-10-30 PROCEDURE — 94761 N-INVAS EAR/PLS OXIMETRY MLT: CPT

## 2024-10-30 PROCEDURE — 2580000003 HC RX 258: Performed by: STUDENT IN AN ORGANIZED HEALTH CARE EDUCATION/TRAINING PROGRAM

## 2024-10-30 RX ADMIN — ALBUTEROL SULFATE 2.5 MG: 2.5 SOLUTION RESPIRATORY (INHALATION) at 19:51

## 2024-10-30 RX ADMIN — ALBUTEROL SULFATE 2.5 MG: 2.5 SOLUTION RESPIRATORY (INHALATION) at 08:42

## 2024-10-30 RX ADMIN — HEPARIN SODIUM 5000 UNITS: 5000 INJECTION INTRAVENOUS; SUBCUTANEOUS at 21:55

## 2024-10-30 RX ADMIN — TRAZODONE HYDROCHLORIDE 50 MG: 50 TABLET ORAL at 21:55

## 2024-10-30 RX ADMIN — LANSOPRAZOLE 30 MG: 30 TABLET, ORALLY DISINTEGRATING ORAL at 06:02

## 2024-10-30 RX ADMIN — MICONAZOLE NITRATE: 2 POWDER TOPICAL at 09:00

## 2024-10-30 RX ADMIN — ALBUTEROL SULFATE 2.5 MG: 2.5 SOLUTION RESPIRATORY (INHALATION) at 12:38

## 2024-10-30 RX ADMIN — ALBUTEROL SULFATE 2.5 MG: 2.5 SOLUTION RESPIRATORY (INHALATION) at 23:43

## 2024-10-30 RX ADMIN — IRON SUCROSE 200 MG: 20 INJECTION, SOLUTION INTRAVENOUS at 17:21

## 2024-10-30 RX ADMIN — HEPARIN SODIUM 5000 UNITS: 5000 INJECTION INTRAVENOUS; SUBCUTANEOUS at 13:38

## 2024-10-30 RX ADMIN — ALBUTEROL SULFATE 2.5 MG: 2.5 SOLUTION RESPIRATORY (INHALATION) at 03:27

## 2024-10-30 RX ADMIN — GABAPENTIN 100 MG: 100 CAPSULE ORAL at 13:38

## 2024-10-30 RX ADMIN — HEPARIN SODIUM 5000 UNITS: 5000 INJECTION INTRAVENOUS; SUBCUTANEOUS at 06:02

## 2024-10-30 RX ADMIN — ALPRAZOLAM 0.5 MG: 0.5 TABLET ORAL at 06:01

## 2024-10-30 RX ADMIN — ALBUTEROL SULFATE 2.5 MG: 2.5 SOLUTION RESPIRATORY (INHALATION) at 16:21

## 2024-10-30 RX ADMIN — MICONAZOLE NITRATE: 2 POWDER TOPICAL at 21:55

## 2024-10-30 RX ADMIN — ALPRAZOLAM 0.5 MG: 0.5 TABLET ORAL at 23:08

## 2024-10-30 RX ADMIN — MIDODRINE HYDROCHLORIDE 10 MG: 5 TABLET ORAL at 06:01

## 2024-10-30 RX ADMIN — LORAZEPAM 1 MG: 1 TABLET ORAL at 10:46

## 2024-10-30 RX ADMIN — ATORVASTATIN CALCIUM 40 MG: 40 TABLET, FILM COATED ORAL at 21:55

## 2024-10-30 RX ADMIN — MIDODRINE HYDROCHLORIDE 10 MG: 5 TABLET ORAL at 17:16

## 2024-10-30 RX ADMIN — LORAZEPAM 1 MG: 1 TABLET ORAL at 18:36

## 2024-10-30 RX ADMIN — PIPERACILLIN AND TAZOBACTAM 3375 MG: 3; .375 INJECTION, POWDER, LYOPHILIZED, FOR SOLUTION INTRAVENOUS at 13:56

## 2024-10-30 RX ADMIN — MIDODRINE HYDROCHLORIDE 10 MG: 5 TABLET ORAL at 12:03

## 2024-10-30 RX ADMIN — PIPERACILLIN AND TAZOBACTAM 3375 MG: 3; .375 INJECTION, POWDER, LYOPHILIZED, FOR SOLUTION INTRAVENOUS at 00:37

## 2024-10-30 RX ADMIN — ALPRAZOLAM 0.5 MG: 0.5 TABLET ORAL at 13:38

## 2024-10-30 RX ADMIN — OXYCODONE HYDROCHLORIDE 5 MG: 5 TABLET ORAL at 15:52

## 2024-10-30 RX ADMIN — OXYCODONE HYDROCHLORIDE 5 MG: 5 TABLET ORAL at 21:55

## 2024-10-30 RX ADMIN — EPOETIN ALFA-EPBX 10000 UNITS: 10000 INJECTION, SOLUTION INTRAVENOUS; SUBCUTANEOUS at 10:28

## 2024-10-30 ASSESSMENT — PULMONARY FUNCTION TESTS
PIF_VALUE: 23
PIF_VALUE: 20
PIF_VALUE: 23
PIF_VALUE: 20
PIF_VALUE: 21
PIF_VALUE: 19
PIF_VALUE: 23
PIF_VALUE: 22
PIF_VALUE: 20
PIF_VALUE: 21
PIF_VALUE: 21
PIF_VALUE: 20
PIF_VALUE: 22
PIF_VALUE: 24
PIF_VALUE: 21
PIF_VALUE: 22

## 2024-10-30 ASSESSMENT — PAIN DESCRIPTION - PAIN TYPE
TYPE: CHRONIC PAIN

## 2024-10-30 ASSESSMENT — PAIN SCALES - GENERAL
PAINLEVEL_OUTOF10: 0
PAINLEVEL_OUTOF10: 2
PAINLEVEL_OUTOF10: 9
PAINLEVEL_OUTOF10: 0
PAINLEVEL_OUTOF10: 9
PAINLEVEL_OUTOF10: 5
PAINLEVEL_OUTOF10: 0
PAINLEVEL_OUTOF10: 0
PAINLEVEL_OUTOF10: 7

## 2024-10-30 ASSESSMENT — PAIN SCALES - WONG BAKER: WONGBAKER_NUMERICALRESPONSE: HURTS A LITTLE BIT

## 2024-10-30 ASSESSMENT — PAIN DESCRIPTION - ONSET
ONSET: GRADUAL
ONSET: GRADUAL
ONSET: ON-GOING

## 2024-10-30 ASSESSMENT — PAIN DESCRIPTION - FREQUENCY
FREQUENCY: INTERMITTENT
FREQUENCY: INTERMITTENT
FREQUENCY: CONTINUOUS

## 2024-10-30 ASSESSMENT — PAIN DESCRIPTION - DESCRIPTORS
DESCRIPTORS: ACHING

## 2024-10-30 ASSESSMENT — PAIN DESCRIPTION - ORIENTATION
ORIENTATION: MID;LOWER

## 2024-10-30 ASSESSMENT — PAIN DESCRIPTION - LOCATION
LOCATION: BACK

## 2024-10-30 ASSESSMENT — PAIN - FUNCTIONAL ASSESSMENT
PAIN_FUNCTIONAL_ASSESSMENT: ACTIVITIES ARE NOT PREVENTED

## 2024-10-30 NOTE — PROGRESS NOTES
Nephrology Progress Note        [address], [address]  [place] [address]  Phone: [phone]  Office Hours: 8:30AM - 4:30PM  Monday - Friday        10/30/2024 8:21 AM  Subjective:   Admit Date: 10/25/2024  PCP: Carlos Nina MD  Interval History: on MV  Soft BP    Diet: ADULT TUBE FEEDING; PEG; Renal Formula; Continuous; 20; Yes; 20; Q 4 hours; 40; 30; Q 4 hours  ADULT DIET; Regular      Data:   Scheduled Meds:   iron sucrose  200 mg IntraVENous Q24H    epoetin gabriel-epbx  10,000 Units IntraVENous Once per day on Monday Wednesday Friday    piperacillin-tazobactam  3,375 mg IntraVENous Q12H    miconazole   Topical BID    ALPRAZolam  0.5 mg PEG Tube Once    atorvastatin  40 mg Oral Nightly    gabapentin  100 mg Per G Tube Daily    lansoprazole  30 mg Oral QAM AC    traZODone  50 mg Oral Nightly    midodrine  10 mg Oral TID WC    heparin (porcine)  5,000 Units SubCUTAneous 3 times per day    albuterol  2.5 mg Nebulization Q4H     Continuous Infusions:   sodium chloride      dextrose       PRN Meds:LORazepam **OR** LORazepam, sodium chloride, glucose, dextrose bolus **OR** dextrose bolus, glucagon (rDNA), dextrose, ipratropium 0.5 mg-albuterol 2.5 mg, ALPRAZolam, oxyCODONE, sodium chloride (Inhalant)  I/O last 3 completed shifts:  In: 3035.5 [I.V.:241.1; Blood:420.7; NG/GT:1735; IV Piggyback:638.8]  Out: 225 [Urine:225]  No intake/output data recorded.    Intake/Output Summary (Last 24 hours) at 10/30/2024 0821  Last data filed at 10/30/2024 0600  Gross per 24 hour   Intake 2138.13 ml   Output 175 ml   Net 1963.13 ml       CBC:   Recent Labs     10/28/24  0635 10/29/24  0330 10/29/24  1930   WBC 4.7 4.0  --    HGB 7.6* 7.1* 7.9*    145  --        BMP:    Recent Labs     10/28/24  0635 10/29/24  0330   * 129*   K 5.0 4.3   CL 97* 97*   CO2 26 28   BUN 28* 18   CREATININE 3.4* 2.5*   GLUCOSE 62* 85   CALCIUM 11.5* 11.3*     Hepatic:   Recent Labs     10/28/24  0635 10/29/24  0330   AST 23

## 2024-10-30 NOTE — PLAN OF CARE
Problem: Safety - Adult  Goal: Free from fall injury  10/30/2024 1140 by Christy Laureano RN  Outcome: Progressing  10/30/2024 0002 by Pelon Valverde RN  Outcome: Progressing     Problem: Discharge Planning  Goal: Discharge to home or other facility with appropriate resources  10/30/2024 1140 by Christy Laureano RN  Outcome: Progressing  10/30/2024 0002 by Pelon Valverde RN  Outcome: Progressing  Flowsheets (Taken 10/29/2024 2000)  Discharge to home or other facility with appropriate resources:   Identify barriers to discharge with patient and caregiver   Arrange for needed discharge resources and transportation as appropriate   Identify discharge learning needs (meds, wound care, etc)     Problem: Skin/Tissue Integrity  Goal: Absence of new skin breakdown  Description: 1.  Monitor for areas of redness and/or skin breakdown  2.  Assess vascular access sites hourly  3.  Every 4-6 hours minimum:  Change oxygen saturation probe site  4.  Every 4-6 hours:  If on nasal continuous positive airway pressure, respiratory therapy assess nares and determine need for appliance change or resting period.  10/30/2024 1140 by Christy Laureano RN  Outcome: Progressing  10/30/2024 0002 by Pelon Valverde RN  Outcome: Progressing     Problem: Pain  Goal: Verbalizes/displays adequate comfort level or baseline comfort level  10/30/2024 1140 by Christy Laureano RN  Outcome: Progressing  10/30/2024 0002 by Pelon Valverde RN  Outcome: Progressing  Flowsheets (Taken 10/29/2024 2000)  Verbalizes/displays adequate comfort level or baseline comfort level:   Encourage patient to monitor pain and request assistance   Assess pain using appropriate pain scale   Administer analgesics based on type and severity of pain and evaluate response   Implement non-pharmacological measures as appropriate and evaluate response   Consider cultural and social influences on pain and pain management   Notify Licensed Independent Practitioner if

## 2024-10-30 NOTE — CONSULTS
24 hour central line rounding completed. No dressing change indicated. Dressing is clean, dry, and intact. Patient continues to meet the following criteria for central vascular access: Hemodialysis    Tunneled HD Catheter is RED/TENDER; site visualized is RED/YELLOW. Site will need evaluated by ID or primary hospitalist on case.      ID informed of this assessment via [employer]; consult placed for wound to guide bedside on how to help the site heal.     Consult the Vascular Access Team for questions, concerns, or change in patient's condition.

## 2024-10-30 NOTE — PROGRESS NOTES
Infectious Disease Progress Note  10/30/2024   Patient Name: Tico Day : 1960   Impression  Pseudomonas aeruginosa Pneumonia:  Acute on Chronic Hypoxic Respiratory Failure Requiring Mechanical Ventilator to Chronic Trach:  Continue IV Zosyn as difficult to say if ProBNP elevation in ESRD pt is causing the acute on chronic hypoxic resp failure or is it pneumonia but will treat for pneumonia.   No reported allergies to ABX. CrCl 35 on ESRD. T-max 99.6. No leukocytosis. Pro-BNP 43,703. Viral resp panel and MRSA by PCR negative. CRP on dwt.   10/25-BC 0/2 NGTD  10/26-Resp culture: Pseudomonas aeruginosa (pan sensi)   ESRD on HD (MWF):  Dr. Morales onboard  Decompensated HFrEF:  Multi-morbidity: per PMHx: ESRD on HD thrice weekly MWF, chronic HFrEF, chronic respiratory failure with tracheostomy since 2024, left atrial thrombus, wide-complex tachycardia, CAD s/p PCI , COPD, anemia of chronic disease, ALONDRA, anxiety and chronic pain with opioid dependence  Plan:  Continue IV Zosyn 3,375 mg tid, plan for a cumulative 14 day course (end date 2024)  Trend CRP and Pct, ordered    Ongoing Antimicrobial Therapy  Zosyn 10/28- ?  Completed Antimicrobial Therapy  Meropenem 10/26-  Doxycycline 10/25  Cefepime 10/25  Vancomycin 10/25-??  History:?Interval history noted. Chief complaint: Pseudomonas aeruginosa pneumonia.   Denies n/v/d/f or untoward effects of antibiotics  Physical Exam:  Vital Signs: BP (!) 110/57   Pulse 75   Temp 97.5 °F (36.4 °C)   Resp 22   Ht 1.753 m (5' 9\")   Wt 96.4 kg (212 lb 8.4 oz)   SpO2 100%   BMI 31.38 kg/m²     Gen: alert, trach to vent, no distress  Skin: no stigmata of endocarditis  Wounds: C/D/I  Vascath intact right upper chest.   HEMT: AT/NC Oropharynx pink, moist, and without lesions or exudates; dentition in good state of repair  Eyes: PERRLA, EOMI, conjunctiva pink, sclera anicteric.   Neck: Supple. Trachea midline. No LAD.  Chest: no distress and CTA.  Diminished breath sounds, tracheostomy intact on mechanical ventilator.   Heart: NSR and no MRG.   Abd: soft, non-distended, no tenderness, no hepatomegaly. Normoactive bowel sounds.  PEG Tube: Intact LUQ   Ext: no clubbing, cyanosis, or edema  External Catheter Site: without erythema or tenderness draining clear yellow urine  Neuro: Mental status intact. CN 2-12 intact and no focal sensory or motor deficits     Radiologic / Imaging / TESTING  10/25/2024 XR Chest Portable:  IMPRESSION:  1. Moderate diffuse bilateral infiltrate versus edema.  2. Dialysis catheter and tracheostomy tube in good position.  3. Small right pleural effusion.     10/26/2024 CT Abdomen Pelvis WO Contrast:  IMPRESSION:  Extremely limited evaluation due to extensive artifact from poor arm positioning and right upper quadrant clips. If suspicion for gallbladder pathology, right upper quadrant ultrasound is recommended. Hepatic cirrhosis with sequelae including moderate ascites and splenomegaly. Diverticulosis.Small bilateral pleural effusions with adjacent consolidation/atelectasis. Right lung base subcentimeter nodular opacities, incompletely visualized. Consider short-term follow-up dedicated CT of the chest. Cardiomegaly.Umbilical fat-containing hernia.     Labs:    Recent Results (from the past 24 hour(s))   TYPE AND SCREEN    Collection Time: 10/29/24 11:45 AM   Result Value Ref Range    Blood Bank Sample Expiration 11/01/2024,6739     ABO/Rh A POSITIVE     Antibody Screen NEGATIVE     Unit Number [id number]     Component Leukocyte Reduced Red Cell     Unit Divison 00     Dispense Status Blood Bank TRANSFUSED     Unit Issue Date/Time 202410291419     Product Code Blood Bank [id number]     Blood Bank Unit Type and Rh A POS     Blood Bank ISBT Product Blood Type 6200     Blood Bank Blood Product Expiration Date 202411212359     Transfusion Status OK TO TRANSFUSE     Crossmatch Result COMPATIBLE    C-Reactive Protein    Collection Time:

## 2024-10-30 NOTE — PROGRESS NOTES
V2.0  [facility] [place] Hospitalist Progress Note      Name:  Tico Day /Age/Sex: 1960  (64 y.o. male)   MRN & CSN:  [id number] & [id number] Encounter Date/Time: 10/30/2024 10:55 AM EDT    Location:  -A PCP: Carlos Nina MD       Hospital Day: 6    Assessment and Plan:   Tico Day is a 64 y.o. male with pmh of  ESRD on HD , chronic systolic heart failure, chronic respiratory failure with tracheostomy since , left atrial thrombus, wide-complex tachycardia, CAD with history of PCI in , COPD, anemia of chronic disease, obstructive sleep apnea, anxiety, chronic pain with opioid dependence  who presents with Acute on chronic respiratory failure      Plan:  Tracheostomy dependence  Acute on chronic respiratory failure with hypoxia requiring ventilatory support  Secondary to acute on chronic systolic heart failure, possible pneumonia  Chest x-ray personally reviewed bilateral pulmonary infiltrates R>L and cardiomegaly Pro-BNP 43,703.  Last echocardiogram from 2024 reviewed EF of 40%.  Negative respiratory viral panel  monitor intake and output measure daily weight  Nephrology consultation for inpatient management of HD and volume management  Sputum culture growing Pseudomonas aeruginosa sensitive to Zosyn switched from meropenem to Zosyn; ID team consulted - plan for 2 weeks total coverage, continue zosyn - end date 2024  Not tolerating pressure support ventilation     Possible central line infection  Painful, tender, erythematous central line   ID team evaluated as well- recommending blood culture x 2 from the site by HD nurse - order given  Would need TDC to be exchanged  Will reach out to nephrology and if agreed on exchange will consult IR    ESRD on HD   Nephrology consultation for inpatient management of hemodialysis     Elevated troponin secondary to demand ischemia  Coronary artery disease with history of PCI in   EKG

## 2024-10-30 NOTE — PLAN OF CARE
Monitor oral intake, labs, and treatment plans   Recommend appropriate diets, oral nutritional supplements, and vitamin/mineral supplements   Recommend, monitor, and adjust tube feedings and TPN/PPN based on assessed needs  Taken 10/29/2024 1020  Nutrient intake appropriate for improving, restoring, or maintaining nutritional needs:   Monitor oral intake, labs, and treatment plans   Recommend, monitor, and adjust tube feedings and TPN/PPN based on assessed needs   Recommend appropriate diets, oral nutritional supplements, and vitamin/mineral supplements     Problem: Neurosensory - Adult  Goal: Achieves maximal functionality and self care  Outcome: Progressing     Problem: Respiratory - Adult  Goal: Achieves optimal ventilation and oxygenation  Outcome: Progressing  Flowsheets (Taken 10/29/2024 2000)  Achieves optimal ventilation and oxygenation:   Assess for changes in respiratory status   Assess for changes in mentation and behavior   Position to facilitate oxygenation and minimize respiratory effort   Oxygen supplementation based on oxygen saturation or arterial blood gases   Initiate smoking cessation protocol as indicated   Assess the need for suctioning and aspirate as needed   Encourage broncho-pulmonary hygiene including cough, deep breathe, incentive spirometry   Assess and instruct to report shortness of breath or any respiratory difficulty   Respiratory therapy support as indicated     Problem: Cardiovascular - Adult  Goal: Maintains optimal cardiac output and hemodynamic stability  Outcome: Progressing     Problem: Skin/Tissue Integrity - Adult  Goal: Skin integrity remains intact  Outcome: Progressing  Flowsheets (Taken 10/29/2024 2000)  Skin Integrity Remains Intact:   Monitor for areas of redness and/or skin breakdown   Assess vascular access sites hourly   Every 4-6 hours minimum: Change oxygen saturation probe site   Every 4-6 hours: If on nasal continuous positive airway pressure, respiratory

## 2024-10-30 NOTE — PROGRESS NOTES
10/30/24 1245 350 ml/min 170 ml/hr 436 ml -80 mmHg 70 20 700 no change Yes   10/30/24 1300 350 ml/min 170 ml/hr 477 ml -80 mmHg 70 10 700 no change Yes   10/30/24 1309 -- -- 501 ml -- -- -- -- tx complete --     Vital Signs  Patient Vitals for the past 8 hrs:   BP Temp Pulse Resp SpO2 Weight Weight Method Percent Weight Change   10/30/24 0600 (!) 105/56 -- 77 19 100 % 96.4 kg (212 lb 8.4 oz) Bed scale 0   10/30/24 0800 (!) 87/48 98 °F (36.7 °C) 72 17 96 % -- -- --   10/30/24 0844 -- -- 73 15 99 % -- -- --   10/30/24 0900 (!) 100/54 -- 74 18 99 % -- -- --   10/30/24 0945 (!) 99/50 97.5 °F (36.4 °C) 73 18 99 % -- -- --   10/30/24 1000 (!) 96/52 -- 74 18 99 % -- -- --   10/30/24 1008 -- -- 73 19 99 % -- -- --   10/30/24 1015 (!) 108/56 -- 71 17 100 % -- -- --   10/30/24 1030 (!) 113/56 -- 72 19 100 % -- -- --   10/30/24 1045 115/60 -- 77 20 100 % -- -- --   10/30/24 1100 (!) 113/57 -- 78 23 100 % -- -- --   10/30/24 1115 (!) 110/57 -- 76 22 100 % -- -- --   10/30/24 1130 (!) 105/57 -- 75 22 100 % -- -- --   10/30/24 1145 (!) 107/59 -- 76 21 100 % -- -- --   10/30/24 1200 114/66 98.2 °F (36.8 °C) 79 24 100 % -- -- --   10/30/24 1215 115/61 -- 79 24 100 % -- -- --   10/30/24 1230 (!) 115/59 -- 76 22 100 % -- -- --   10/30/24 1245 121/63 -- 78 23 100 % -- -- --   10/30/24 1300 114/65 -- 84 19 100 % -- -- --   10/30/24 1315 122/60 97.9 °F (36.6 °C) 86 23 100 % -- -- --     Post-Dialysis  Arterial Catheter Locking Solution:  normal saline  Venous Catheter Locking Solution:  normal saline  Post-Treatment Procedures: Blood returned, Catheter Capped, clamped with Saline x2 ports  Machine Disinfection Process: Exterior Machine Disinfection, Acid/Vinegar Clean, Heat Disinfect  Rinseback Volume (ml): 300 ml  Blood Volume Processed (Liters): 60.8 L  Dialyzer Clearance: Lightly streaked  Duration of Treatment (minutes): 180 minutes     Hemodialysis Intake (ml): 500 ml  Hemodialysis Output (ml): 501 ml     Tolerated Treatment:  Good  Patient Response to Treatment: tolerated well  Physician Notified: No       Provider Notification        Handoff complete and report given to Primary RN at 1325 hours.  Primary RN (First Initial, Last Name, Title):  OK Laureano RN     Education  Person Educated: Patient   Knowledge Base: Substantial  Barriers to Learning?: Yes, including trach vent  Preferred method of Learning: Oral  Topic(s): Access Care, Signs and Symptoms of Infection, Fluid Management, Albumin, Procedural, Medications, Treatment Options, Potassium, Diet, and Transplant   Teaching Tools: Explanation   Response to Education: Requires Follow-up     Electronically signed by Tiffanie King RN on 10/30/2024 at 1:27 PM

## 2024-10-31 ENCOUNTER — APPOINTMENT (OUTPATIENT)
Dept: INTERVENTIONAL RADIOLOGY/VASCULAR | Age: 64
End: 2024-10-31
Payer: MEDICARE

## 2024-10-31 LAB
CRP SERPL HS-MCNC: 115 MG/L (ref 0–5)
ERYTHROCYTE [DISTWIDTH] IN BLOOD BY AUTOMATED COUNT: 17.2 % (ref 11.7–14.9)
GLUCOSE BLD-MCNC: 94 MG/DL (ref 74–99)
HCT VFR BLD AUTO: 28.5 % (ref 42–52)
HGB BLD-MCNC: 8.6 G/DL (ref 13.5–18)
INR PPP: 1.3
MCH RBC QN AUTO: 28.2 PG (ref 27–31)
MCHC RBC AUTO-ENTMCNC: 30.2 G/DL (ref 32–36)
MCV RBC AUTO: 93.4 FL (ref 78–100)
PARTIAL THROMBOPLASTIN TIME: 47.7 SEC (ref 25.1–37.1)
PLATELET # BLD AUTO: 176 K/UL (ref 140–440)
PMV BLD AUTO: 9.2 FL (ref 7.5–11.1)
PROCALCITONIN SERPL-MCNC: 3.69 NG/ML
PROTHROMBIN TIME: 16.6 SEC (ref 11.7–14.5)
RBC # BLD AUTO: 3.05 M/UL (ref 4.6–6.2)
WBC OTHER # BLD: 6.1 K/UL (ref 4–10.5)

## 2024-10-31 PROCEDURE — 6370000000 HC RX 637 (ALT 250 FOR IP): Performed by: INTERNAL MEDICINE

## 2024-10-31 PROCEDURE — 87077 CULTURE AEROBIC IDENTIFY: CPT

## 2024-10-31 PROCEDURE — 36589 REMOVAL TUNNELED CV CATH: CPT

## 2024-10-31 PROCEDURE — 87186 SC STD MICRODIL/AGAR DIL: CPT

## 2024-10-31 PROCEDURE — 85730 THROMBOPLASTIN TIME PARTIAL: CPT

## 2024-10-31 PROCEDURE — 94761 N-INVAS EAR/PLS OXIMETRY MLT: CPT

## 2024-10-31 PROCEDURE — 82962 GLUCOSE BLOOD TEST: CPT

## 2024-10-31 PROCEDURE — 6360000002 HC RX W HCPCS: Performed by: STUDENT IN AN ORGANIZED HEALTH CARE EDUCATION/TRAINING PROGRAM

## 2024-10-31 PROCEDURE — 6370000000 HC RX 637 (ALT 250 FOR IP): Performed by: STUDENT IN AN ORGANIZED HEALTH CARE EDUCATION/TRAINING PROGRAM

## 2024-10-31 PROCEDURE — 6360000002 HC RX W HCPCS: Performed by: INTERNAL MEDICINE

## 2024-10-31 PROCEDURE — 85610 PROTHROMBIN TIME: CPT

## 2024-10-31 PROCEDURE — 89220 SPUTUM SPECIMEN COLLECTION: CPT

## 2024-10-31 PROCEDURE — 2500000003 HC RX 250 WO HCPCS

## 2024-10-31 PROCEDURE — 85027 COMPLETE CBC AUTOMATED: CPT

## 2024-10-31 PROCEDURE — 86140 C-REACTIVE PROTEIN: CPT

## 2024-10-31 PROCEDURE — 36415 COLL VENOUS BLD VENIPUNCTURE: CPT

## 2024-10-31 PROCEDURE — 87071 CULTURE AEROBIC QUANT OTHER: CPT

## 2024-10-31 PROCEDURE — 94003 VENT MGMT INPAT SUBQ DAY: CPT

## 2024-10-31 PROCEDURE — 2060000000 HC ICU INTERMEDIATE R&B

## 2024-10-31 PROCEDURE — 2580000003 HC RX 258: Performed by: STUDENT IN AN ORGANIZED HEALTH CARE EDUCATION/TRAINING PROGRAM

## 2024-10-31 PROCEDURE — 2700000000 HC OXYGEN THERAPY PER DAY

## 2024-10-31 PROCEDURE — 6360000002 HC RX W HCPCS

## 2024-10-31 PROCEDURE — 2709999900 IR REMOVE TUNNELED CVAD WO SQ PORT/PUMP

## 2024-10-31 PROCEDURE — 94640 AIRWAY INHALATION TREATMENT: CPT

## 2024-10-31 PROCEDURE — 84145 PROCALCITONIN (PCT): CPT

## 2024-10-31 RX ORDER — HYDROMORPHONE HYDROCHLORIDE 1 MG/ML
0.5 INJECTION, SOLUTION INTRAMUSCULAR; INTRAVENOUS; SUBCUTANEOUS ONCE
Status: COMPLETED | OUTPATIENT
Start: 2024-10-31 | End: 2024-10-31

## 2024-10-31 RX ADMIN — MICONAZOLE NITRATE: 2 POWDER TOPICAL at 09:13

## 2024-10-31 RX ADMIN — PIPERACILLIN AND TAZOBACTAM 3375 MG: 3; .375 INJECTION, POWDER, LYOPHILIZED, FOR SOLUTION INTRAVENOUS at 21:59

## 2024-10-31 RX ADMIN — HEPARIN SODIUM 5000 UNITS: 5000 INJECTION INTRAVENOUS; SUBCUTANEOUS at 06:40

## 2024-10-31 RX ADMIN — ALBUTEROL SULFATE 2.5 MG: 2.5 SOLUTION RESPIRATORY (INHALATION) at 15:34

## 2024-10-31 RX ADMIN — ALBUTEROL SULFATE 2.5 MG: 2.5 SOLUTION RESPIRATORY (INHALATION) at 20:20

## 2024-10-31 RX ADMIN — MICONAZOLE NITRATE: 2 POWDER TOPICAL at 22:00

## 2024-10-31 RX ADMIN — ALPRAZOLAM 0.5 MG: 0.5 TABLET ORAL at 06:38

## 2024-10-31 RX ADMIN — HEPARIN SODIUM 5000 UNITS: 5000 INJECTION INTRAVENOUS; SUBCUTANEOUS at 22:02

## 2024-10-31 RX ADMIN — OXYCODONE HYDROCHLORIDE 5 MG: 5 TABLET ORAL at 22:02

## 2024-10-31 RX ADMIN — PIPERACILLIN AND TAZOBACTAM 3375 MG: 3; .375 INJECTION, POWDER, LYOPHILIZED, FOR SOLUTION INTRAVENOUS at 01:07

## 2024-10-31 RX ADMIN — ATORVASTATIN CALCIUM 40 MG: 40 TABLET, FILM COATED ORAL at 20:04

## 2024-10-31 RX ADMIN — LANSOPRAZOLE 30 MG: 30 TABLET, ORALLY DISINTEGRATING ORAL at 06:38

## 2024-10-31 RX ADMIN — LORAZEPAM 1 MG: 1 TABLET ORAL at 00:38

## 2024-10-31 RX ADMIN — TRAZODONE HYDROCHLORIDE 50 MG: 50 TABLET ORAL at 22:02

## 2024-10-31 RX ADMIN — PIPERACILLIN AND TAZOBACTAM 3375 MG: 3; .375 INJECTION, POWDER, LYOPHILIZED, FOR SOLUTION INTRAVENOUS at 13:15

## 2024-10-31 RX ADMIN — ALBUTEROL SULFATE 2.5 MG: 2.5 SOLUTION RESPIRATORY (INHALATION) at 03:32

## 2024-10-31 RX ADMIN — ALPRAZOLAM 0.5 MG: 0.5 TABLET ORAL at 20:04

## 2024-10-31 RX ADMIN — ALBUTEROL SULFATE 2.5 MG: 2.5 SOLUTION RESPIRATORY (INHALATION) at 07:42

## 2024-10-31 RX ADMIN — IRON SUCROSE 200 MG: 20 INJECTION, SOLUTION INTRAVENOUS at 16:04

## 2024-10-31 RX ADMIN — OXYCODONE HYDROCHLORIDE 5 MG: 5 TABLET ORAL at 16:03

## 2024-10-31 RX ADMIN — HYDROMORPHONE HYDROCHLORIDE 0.5 MG: 1 INJECTION, SOLUTION INTRAMUSCULAR; INTRAVENOUS; SUBCUTANEOUS at 18:39

## 2024-10-31 RX ADMIN — GABAPENTIN 100 MG: 100 CAPSULE ORAL at 09:10

## 2024-10-31 RX ADMIN — MIDODRINE HYDROCHLORIDE 10 MG: 5 TABLET ORAL at 13:15

## 2024-10-31 RX ADMIN — ALBUTEROL SULFATE 2.5 MG: 2.5 SOLUTION RESPIRATORY (INHALATION) at 11:27

## 2024-10-31 ASSESSMENT — PAIN DESCRIPTION - ORIENTATION
ORIENTATION: LOWER
ORIENTATION: LOWER
ORIENTATION: RIGHT;UPPER
ORIENTATION: RIGHT;UPPER

## 2024-10-31 ASSESSMENT — PAIN DESCRIPTION - LOCATION
LOCATION: OTHER (COMMENT)
LOCATION: BACK
LOCATION: BACK
LOCATION: OTHER (COMMENT)

## 2024-10-31 ASSESSMENT — PAIN - FUNCTIONAL ASSESSMENT
PAIN_FUNCTIONAL_ASSESSMENT: ACTIVITIES ARE NOT PREVENTED

## 2024-10-31 ASSESSMENT — PAIN SCALES - GENERAL
PAINLEVEL_OUTOF10: 7
PAINLEVEL_OUTOF10: 2
PAINLEVEL_OUTOF10: 8
PAINLEVEL_OUTOF10: 0
PAINLEVEL_OUTOF10: 10
PAINLEVEL_OUTOF10: 3

## 2024-10-31 ASSESSMENT — PULMONARY FUNCTION TESTS
PIF_VALUE: 24
PIF_VALUE: 20
PIF_VALUE: 22
PIF_VALUE: 21
PIF_VALUE: 19
PIF_VALUE: 21
PIF_VALUE: 18
PIF_VALUE: 19
PIF_VALUE: 23
PIF_VALUE: 26
PIF_VALUE: 19
PIF_VALUE: 19
PIF_VALUE: 22
PIF_VALUE: 18
PIF_VALUE: 22
PIF_VALUE: 21
PIF_VALUE: 17
PIF_VALUE: 22
PIF_VALUE: 20
PIF_VALUE: 19
PIF_VALUE: 20

## 2024-10-31 ASSESSMENT — PAIN DESCRIPTION - PAIN TYPE
TYPE: CHRONIC PAIN
TYPE: CHRONIC PAIN
TYPE: ACUTE PAIN

## 2024-10-31 ASSESSMENT — PAIN DESCRIPTION - DESCRIPTORS
DESCRIPTORS: ACHING;SORE
DESCRIPTORS: ACHING;SHARP;SORE
DESCRIPTORS: DULL
DESCRIPTORS: ACHING;SORE

## 2024-10-31 ASSESSMENT — PAIN DESCRIPTION - FREQUENCY
FREQUENCY: CONTINUOUS

## 2024-10-31 ASSESSMENT — PAIN DESCRIPTION - ONSET
ONSET: ON-GOING
ONSET: ON-GOING
ONSET: GRADUAL

## 2024-10-31 NOTE — PROGRESS NOTES
Infectious Disease Progress Note  10/31/2024   Patient Name: Tico Day : 1960   Impression  Pseudomonas aeruginosa Pneumonia:  Acute on Chronic Hypoxic Respiratory Failure Requiring Mechanical Ventilator to Chronic Trach:  Continue IV Zosyn as difficult to say if ProBNP elevation in ESRD pt is causing the acute on chronic hypoxic resp failure or is it pneumonia but will treat for pneumonia. Concern for possible central line infection, removed 10/31-await cultures.   No reported allergies to ABX. CrCl 35 on ESRD. T-max 99.6. No leukocytosis. Pro-BNP 43,703. Viral resp panel and MRSA by PCR negative. CRP on dwt.   10/25-BC 0/2 NGTD  10/31-CVC removed, Culture: Pending  10/26-Resp culture: Pseudomonas aeruginosa (pan sensi)   ESRD on HD (MWF):  Dr. Morales onboard  Decompensated HFrEF:  Multi-morbidity: per PMHx: ESRD on HD thrice weekly MWF, chronic HFrEF, chronic respiratory failure with tracheostomy since 2024, left atrial thrombus, wide-complex tachycardia, CAD s/p PCI , COPD, anemia of chronic disease, ALONDRA, anxiety and chronic pain with opioid dependence  Plan:  Continue IV Zosyn 3,375 mg tid, plan for a cumulative 14 day course (end date 2024)  Trend CRP and Pct, ordered  Await CVC tip culture for concern for possible central line infection     Ongoing Antimicrobial Therapy  Zosyn 10/28- ?  Completed Antimicrobial Therapy  Meropenem 10/26-28  Doxycycline 10/25  Cefepime 10/25  Vancomycin 10/25-26??  History:?Interval history noted. Chief complaint: Pseudomonas aeruginosa pneumonia.   Denies n/v/d/f or untoward effects of antibiotics  Physical Exam:  Vital Signs: BP (!) 107/55   Pulse 74   Temp 99 °F (37.2 °C) (Oral)   Resp 18   Ht 1.753 m (5' 9\")   Wt 98.8 kg (217 lb 13 oz)   SpO2 99%   BMI 32.17 kg/m²     Gen: alert, trach to vent, no distress  Skin: no stigmata of endocarditis  Wounds: C/D/I-removal site from tunneled CVC right chest.    Vascath intact right upper chest.

## 2024-10-31 NOTE — PROGRESS NOTES
Pt to IR at this time with RT.    Electronically signed by Jackie Holt RN on 10/31/2024 at 5:29 PM

## 2024-10-31 NOTE — PROGRESS NOTES
Nephrology Progress Note        [address], [address]  [place] [address]  Phone: [phone]  Office Hours: 8:30AM - 4:30PM  Monday - Friday        10/31/2024 7:38 AM  Subjective:   Admit Date: 10/25/2024  PCP: Carlos Nina MD  Interval History: Doing OK  HD cath site looking infected thus ID recs to exchange it for safety    Diet: ADULT TUBE FEEDING; PEG; Renal Formula; Continuous; 20; Yes; 20; Q 4 hours; 40; 30; Q 4 hours  ADULT DIET; Regular; Low Potassium (Less than 3000 mg/day); 1500 ml      Data:   Scheduled Meds:   iron sucrose  200 mg IntraVENous Q24H    epoetin gabriel-epbx  10,000 Units IntraVENous Once per day on Monday Wednesday Friday    piperacillin-tazobactam  3,375 mg IntraVENous Q12H    miconazole   Topical BID    ALPRAZolam  0.5 mg PEG Tube Once    atorvastatin  40 mg Oral Nightly    gabapentin  100 mg Per G Tube Daily    lansoprazole  30 mg Oral QAM AC    traZODone  50 mg Oral Nightly    midodrine  10 mg Oral TID WC    heparin (porcine)  5,000 Units SubCUTAneous 3 times per day    albuterol  2.5 mg Nebulization Q4H     Continuous Infusions:   sodium chloride      dextrose       PRN Meds:LORazepam **OR** LORazepam, sodium chloride, glucose, dextrose bolus **OR** dextrose bolus, glucagon (rDNA), dextrose, ipratropium 0.5 mg-albuterol 2.5 mg, ALPRAZolam, oxyCODONE, sodium chloride (Inhalant)  I/O last 3 completed shifts:  In: 2531.5 [NG/GT:1450; IV Piggyback:581.5]  Out: 826 [Urine:325]  No intake/output data recorded.    Intake/Output Summary (Last 24 hours) at 10/31/2024 0738  Last data filed at 10/31/2024 0416  Gross per 24 hour   Intake 1316 ml   Output 651 ml   Net 665 ml       CBC:   Recent Labs     10/29/24  0330 10/29/24  1930   WBC 4.0  --    HGB 7.1* 7.9*     --        BMP:    Recent Labs     10/29/24  0330   *   K 4.3   CL 97*   CO2 28   BUN 18   CREATININE 2.5*   GLUCOSE 85   CALCIUM 11.3*     Hepatic:   Recent Labs     10/29/24  0330   AST 21   ALT <5*

## 2024-10-31 NOTE — PROGRESS NOTES
Received an IR consult, Dr Rubio reviewed the case.  Ordered labs.  Updated the pt nurse.  Per Dr Eden mccormack to proceed with the IR consult. Per Johanne mccormack to proceed with the IR consult.

## 2024-10-31 NOTE — PROGRESS NOTES
V2.0  [facility] [place] Hospitalist Progress Note      Name:  Tico Day /Age/Sex: 1960  (64 y.o. male)   MRN & CSN:  [id number] & [id number] Encounter Date/Time: 10/31/2024 10:55 AM EDT    Location:  -A PCP: Carlos Nina MD       Hospital Day: 7    Assessment and Plan:   Tico Day is a 64 y.o. male with pmh of  ESRD on HD , chronic systolic heart failure, chronic respiratory failure with tracheostomy since , left atrial thrombus, wide-complex tachycardia, CAD with history of PCI in , COPD, anemia of chronic disease, obstructive sleep apnea, anxiety, chronic pain with opioid dependence  who presents with Acute on chronic respiratory failure      Plan:  Tracheostomy dependence  Acute on chronic respiratory failure with hypoxia requiring ventilatory support  Secondary to acute on chronic systolic heart failure, possible pneumonia  Chest x-ray personally reviewed bilateral pulmonary infiltrates R>L and cardiomegaly Pro-BNP 43,703.  Last echocardiogram from 2024 reviewed EF of 40%.  Negative respiratory viral panel  monitor intake and output measure daily weight  Nephrology consultation for inpatient management of HD and volume management  Sputum culture growing Pseudomonas aeruginosa sensitive to Zosyn switched from meropenem to Zosyn; ID team consulted - plan for 2 weeks total coverage, continue zosyn - end date 2024  Not tolerating pressure support ventilation     Possible central line infection  Painful, tender, erythematous central line   ID team evaluated as well- recommending blood culture x 2 from the site by HD nurse - order given  Would need TDC to be exchanged  IR team onboard - will remove TDC and attempt temp HD placement - may end up being on line holiday if IR team unable to let him lie flat today - discussed with Dr. Rubio.    ESRD on HD   Nephrology consultation for inpatient management of hemodialysis     Elevated  94 74 - 99 mg/dL        Imaging/Diagnostics Last 24 Hours   CT ABDOMEN PELVIS WO CONTRAST Additional Contrast? None    Result Date: 10/26/2024  INDICATION: Right-sided abdominal tenderness COMPARISON: None available TECHNIQUE: CT abdomen/pelvis obtained without IV contrast per departmental protocol. FINDINGS: Extremely limited evaluation due to extensive artifact from poor arm positioning and right upper quadrant clips. Small bilateral pleural effusions with adjacent atelectasis/consolidations. Bibasilar groundglass opacities. Right lung base subcentimeter nodular opacities, incompletely visualized. Heavy coronary artery calcifications. Cardiomegaly. Cirrhotic appearing liver. No hepatic lesions. Moderate ascites. No calcified gallstones. No pancreatic lesions. Splenomegaly. Splenic granulomata. No significant adrenal nodules. No hydronephrosis or obstructing renal stones. Nonobstructing left renal lower pole stones measuring 5 mm. Colonic diverticulosis. No evidence of bowel obstruction or inflammation. Heavy atherosclerotic vascular disease. No free air. No lymphadenopathy. Intact osseous structures. Umbilical fat-containing hernia with a sac measuring 6.2 cm in diameter and a neck measuring 1.8 cm in diameter.     Extremely limited evaluation due to extensive artifact from poor arm positioning and right upper quadrant clips. If suspicion for gallbladder pathology, right upper quadrant ultrasound is recommended. Hepatic cirrhosis with sequelae including moderate ascites and splenomegaly. Diverticulosis. Small bilateral pleural effusions with adjacent consolidation/atelectasis. Right lung base subcentimeter nodular opacities, incompletely visualized. Consider short-term follow-up dedicated CT of the chest. Cardiomegaly. Umbilical fat-containing hernia. Electronically signed by FAVIAN AGUDELO    XR CHEST PORTABLE    Result Date: 10/25/2024  INDICATION: cough; r/o pna COMPARISON: None PROCEDURE: Portable chest xray

## 2024-10-31 NOTE — PROGRESS NOTES
Patient back to room at this time from IR, tunneled vascath removed, no new line placed.    Patient complaining of 10/10 pain after the removal, not due for oxy yet. Message sent to Dr. Harmon asking for a one time dose of pain medication. Waiting for response.    Electronically signed by Jackie Holt RN on 10/31/2024 at 6:20 PM

## 2024-10-31 NOTE — PROGRESS NOTES
TRANSFER - OUT REPORT:    Verbal report given to MAYI Mejia on Tico Day being transferred to 2122 for routine progression of patient care       Report consisted of patient's Situation, Background, Assessment and   Recommendations(SBAR).     Information from the following report(s) Nurse Handoff Report was reviewed with the receiving nurse.    Opportunity for questions and clarification was provided.      Patient transported with:   Monitor  Registered Nurse

## 2024-10-31 NOTE — PROGRESS NOTES
Speech-Language Pathology Department  Tico Day  1960  [id number]      Attempted to see Tico Day for a bedside swallowing treatment and diet tolerance monitoring 10/31/24.  Pt deferred PO trials stating he was feeling nauseous at this time.  SLP will re-attempt.        Laila Rodriguez MS, CCC-SLP, 10/31/2024

## 2024-11-01 ENCOUNTER — APPOINTMENT (OUTPATIENT)
Dept: GENERAL RADIOLOGY | Age: 64
End: 2024-11-01
Payer: MEDICARE

## 2024-11-01 LAB
ANION GAP SERPL CALCULATED.3IONS-SCNC: 5 MMOL/L (ref 9–17)
BUN SERPL-MCNC: 22 MG/DL (ref 7–20)
CALCIUM SERPL-MCNC: 11.9 MG/DL (ref 8.3–10.6)
CHLORIDE SERPL-SCNC: 96 MMOL/L (ref 99–110)
CO2 SERPL-SCNC: 30 MMOL/L (ref 21–32)
CREAT SERPL-MCNC: 2.9 MG/DL (ref 0.8–1.3)
GFR, ESTIMATED: 22 ML/MIN/1.73M2
GLUCOSE SERPL-MCNC: 90 MG/DL (ref 74–99)
MAGNESIUM SERPL-MCNC: 2 MG/DL (ref 1.8–2.4)
PHOSPHATE SERPL-MCNC: 3.3 MG/DL (ref 2.5–4.9)
POTASSIUM SERPL-SCNC: 3.5 MMOL/L (ref 3.5–5.1)
SODIUM SERPL-SCNC: 132 MMOL/L (ref 136–145)

## 2024-11-01 PROCEDURE — 92526 ORAL FUNCTION THERAPY: CPT

## 2024-11-01 PROCEDURE — 6360000002 HC RX W HCPCS: Performed by: STUDENT IN AN ORGANIZED HEALTH CARE EDUCATION/TRAINING PROGRAM

## 2024-11-01 PROCEDURE — 89220 SPUTUM SPECIMEN COLLECTION: CPT

## 2024-11-01 PROCEDURE — 84100 ASSAY OF PHOSPHORUS: CPT

## 2024-11-01 PROCEDURE — 94761 N-INVAS EAR/PLS OXIMETRY MLT: CPT

## 2024-11-01 PROCEDURE — 71045 X-RAY EXAM CHEST 1 VIEW: CPT

## 2024-11-01 PROCEDURE — 6370000000 HC RX 637 (ALT 250 FOR IP): Performed by: INTERNAL MEDICINE

## 2024-11-01 PROCEDURE — 6360000002 HC RX W HCPCS

## 2024-11-01 PROCEDURE — 83735 ASSAY OF MAGNESIUM: CPT

## 2024-11-01 PROCEDURE — 6370000000 HC RX 637 (ALT 250 FOR IP): Performed by: STUDENT IN AN ORGANIZED HEALTH CARE EDUCATION/TRAINING PROGRAM

## 2024-11-01 PROCEDURE — 02HV33Z INSERTION OF INFUSION DEVICE INTO SUPERIOR VENA CAVA, PERCUTANEOUS APPROACH: ICD-10-PCS | Performed by: INTERNAL MEDICINE

## 2024-11-01 PROCEDURE — 94003 VENT MGMT INPAT SUBQ DAY: CPT

## 2024-11-01 PROCEDURE — 2580000003 HC RX 258: Performed by: NURSE PRACTITIONER

## 2024-11-01 PROCEDURE — 80048 BASIC METABOLIC PNL TOTAL CA: CPT

## 2024-11-01 PROCEDURE — 94640 AIRWAY INHALATION TREATMENT: CPT

## 2024-11-01 PROCEDURE — 2700000000 HC OXYGEN THERAPY PER DAY

## 2024-11-01 PROCEDURE — 2580000003 HC RX 258: Performed by: STUDENT IN AN ORGANIZED HEALTH CARE EDUCATION/TRAINING PROGRAM

## 2024-11-01 PROCEDURE — 2060000000 HC ICU INTERMEDIATE R&B

## 2024-11-01 PROCEDURE — 6360000002 HC RX W HCPCS: Performed by: NURSE PRACTITIONER

## 2024-11-01 RX ORDER — PROPOFOL 10 MG/ML
INJECTION, EMULSION INTRAVENOUS
Status: DISPENSED
Start: 2024-11-01 | End: 2024-11-02

## 2024-11-01 RX ORDER — MIDAZOLAM HYDROCHLORIDE 2 MG/2ML
2 INJECTION, SOLUTION INTRAMUSCULAR; INTRAVENOUS ONCE
Status: COMPLETED | OUTPATIENT
Start: 2024-11-01 | End: 2024-11-01

## 2024-11-01 RX ORDER — MIDAZOLAM HYDROCHLORIDE 1 MG/ML
INJECTION, SOLUTION INTRAMUSCULAR; INTRAVENOUS
Status: COMPLETED
Start: 2024-11-01 | End: 2024-11-01

## 2024-11-01 RX ORDER — PROPOFOL 10 MG/ML
10 INJECTION, EMULSION INTRAVENOUS CONTINUOUS
Status: DISCONTINUED | OUTPATIENT
Start: 2024-11-01 | End: 2024-11-05

## 2024-11-01 RX ADMIN — ALBUTEROL SULFATE 2.5 MG: 2.5 SOLUTION RESPIRATORY (INHALATION) at 15:35

## 2024-11-01 RX ADMIN — LANSOPRAZOLE 30 MG: 30 TABLET, ORALLY DISINTEGRATING ORAL at 06:21

## 2024-11-01 RX ADMIN — PIPERACILLIN AND TAZOBACTAM 3375 MG: 3; .375 INJECTION, POWDER, LYOPHILIZED, FOR SOLUTION INTRAVENOUS at 20:40

## 2024-11-01 RX ADMIN — MIDODRINE HYDROCHLORIDE 10 MG: 5 TABLET ORAL at 10:15

## 2024-11-01 RX ADMIN — MIDAZOLAM 2 MG: 1 INJECTION INTRAMUSCULAR; INTRAVENOUS at 15:18

## 2024-11-01 RX ADMIN — OXYCODONE HYDROCHLORIDE 5 MG: 5 TABLET ORAL at 13:11

## 2024-11-01 RX ADMIN — MIDAZOLAM HYDROCHLORIDE 2 MG: 2 INJECTION, SOLUTION INTRAMUSCULAR; INTRAVENOUS at 15:18

## 2024-11-01 RX ADMIN — OXYCODONE HYDROCHLORIDE 5 MG: 5 TABLET ORAL at 20:42

## 2024-11-01 RX ADMIN — MICONAZOLE NITRATE: 2 POWDER TOPICAL at 10:15

## 2024-11-01 RX ADMIN — MIDODRINE HYDROCHLORIDE 10 MG: 5 TABLET ORAL at 06:21

## 2024-11-01 RX ADMIN — GABAPENTIN 100 MG: 100 CAPSULE ORAL at 10:15

## 2024-11-01 RX ADMIN — HEPARIN SODIUM 5000 UNITS: 5000 INJECTION INTRAVENOUS; SUBCUTANEOUS at 06:21

## 2024-11-01 RX ADMIN — IPRATROPIUM BROMIDE AND ALBUTEROL SULFATE 1 DOSE: 2.5; .5 SOLUTION RESPIRATORY (INHALATION) at 11:45

## 2024-11-01 RX ADMIN — MICONAZOLE NITRATE: 2 POWDER TOPICAL at 20:44

## 2024-11-01 RX ADMIN — ALPRAZOLAM 0.5 MG: 0.5 TABLET ORAL at 23:08

## 2024-11-01 RX ADMIN — TRAZODONE HYDROCHLORIDE 50 MG: 50 TABLET ORAL at 20:42

## 2024-11-01 RX ADMIN — ALBUTEROL SULFATE 2.5 MG: 2.5 SOLUTION RESPIRATORY (INHALATION) at 04:42

## 2024-11-01 RX ADMIN — HEPARIN SODIUM 5000 UNITS: 5000 INJECTION INTRAVENOUS; SUBCUTANEOUS at 13:11

## 2024-11-01 RX ADMIN — HEPARIN SODIUM 5000 UNITS: 5000 INJECTION INTRAVENOUS; SUBCUTANEOUS at 20:42

## 2024-11-01 RX ADMIN — ATORVASTATIN CALCIUM 40 MG: 40 TABLET, FILM COATED ORAL at 20:42

## 2024-11-01 RX ADMIN — OXYCODONE HYDROCHLORIDE 5 MG: 5 TABLET ORAL at 06:20

## 2024-11-01 RX ADMIN — ALBUTEROL SULFATE 2.5 MG: 2.5 SOLUTION RESPIRATORY (INHALATION) at 00:03

## 2024-11-01 RX ADMIN — MIDODRINE HYDROCHLORIDE 10 MG: 5 TABLET ORAL at 17:27

## 2024-11-01 RX ADMIN — PIPERACILLIN AND TAZOBACTAM 3375 MG: 3; .375 INJECTION, POWDER, LYOPHILIZED, FOR SOLUTION INTRAVENOUS at 04:53

## 2024-11-01 RX ADMIN — LORAZEPAM 1 MG: 1 TABLET ORAL at 18:56

## 2024-11-01 RX ADMIN — ALBUTEROL SULFATE 2.5 MG: 2.5 SOLUTION RESPIRATORY (INHALATION) at 08:03

## 2024-11-01 RX ADMIN — LORAZEPAM 1 MG: 1 TABLET ORAL at 00:52

## 2024-11-01 RX ADMIN — MIDAZOLAM 2 MG: 1 INJECTION INTRAMUSCULAR; INTRAVENOUS at 14:55

## 2024-11-01 RX ADMIN — ALBUTEROL SULFATE 2.5 MG: 2.5 SOLUTION RESPIRATORY (INHALATION) at 20:56

## 2024-11-01 RX ADMIN — LORAZEPAM 1 MG: 1 TABLET ORAL at 11:19

## 2024-11-01 ASSESSMENT — PAIN DESCRIPTION - DESCRIPTORS
DESCRIPTORS: ACHING
DESCRIPTORS: ACHING;SORE
DESCRIPTORS: ACHING

## 2024-11-01 ASSESSMENT — PAIN SCALES - GENERAL
PAINLEVEL_OUTOF10: 3
PAINLEVEL_OUTOF10: 3
PAINLEVEL_OUTOF10: 8
PAINLEVEL_OUTOF10: 7
PAINLEVEL_OUTOF10: 10
PAINLEVEL_OUTOF10: 0
PAINLEVEL_OUTOF10: 0
PAINLEVEL_OUTOF10: 6

## 2024-11-01 ASSESSMENT — PAIN DESCRIPTION - FREQUENCY
FREQUENCY: INTERMITTENT
FREQUENCY: CONTINUOUS

## 2024-11-01 ASSESSMENT — PAIN DESCRIPTION - ONSET: ONSET: ON-GOING

## 2024-11-01 ASSESSMENT — PAIN DESCRIPTION - LOCATION
LOCATION: NECK
LOCATION: NECK
LOCATION: BACK

## 2024-11-01 ASSESSMENT — PULMONARY FUNCTION TESTS
PIF_VALUE: 20
PIF_VALUE: 18
PIF_VALUE: 16
PIF_VALUE: 20
PIF_VALUE: 19
PIF_VALUE: 20
PIF_VALUE: 21
PIF_VALUE: 17
PIF_VALUE: 20
PIF_VALUE: 16
PIF_VALUE: 18
PIF_VALUE: 17
PIF_VALUE: 19
PIF_VALUE: 21
PIF_VALUE: 21
PIF_VALUE: 20

## 2024-11-01 ASSESSMENT — PAIN DESCRIPTION - PAIN TYPE: TYPE: CHRONIC PAIN

## 2024-11-01 ASSESSMENT — PAIN DESCRIPTION - ORIENTATION
ORIENTATION: LEFT
ORIENTATION: LOWER
ORIENTATION: LEFT

## 2024-11-01 ASSESSMENT — PAIN SCALES - WONG BAKER: WONGBAKER_NUMERICALRESPONSE: HURTS A LITTLE BIT

## 2024-11-01 ASSESSMENT — PAIN - FUNCTIONAL ASSESSMENT: PAIN_FUNCTIONAL_ASSESSMENT: ACTIVITIES ARE NOT PREVENTED

## 2024-11-01 NOTE — PROGRESS NOTES
Comprehensive Nutrition Assessment    Type and Reason for Visit:  Reassess    Nutrition Recommendations/Plan:   Continue current diet and tube feeding regimen  Monitor weights, po intakes, renal fx, lytes, POC     Malnutrition Assessment:  Malnutrition Status:  Moderate malnutrition (10/27/24 1329)    Context:  Social/Environmental Circumstances       Nutrition Assessment:    Pt remains on regular diet w/ supplemental TF as po intakes noted to be poor per notes. However, no po intakes have been documented in flowsheets, difficult to assess. Pt working w/ staff at visits. TF at goal since 10/29, continue at current rate, which meets >75% estimated needs. Continue to follow at high nutrition risk.    Nutrition Related Findings:    retacrit; GFR 22, BUN 22, Cr 2.9, na 132 other lytes WNL Wound Type: Stage II, Pressure Injury       Current Nutrition Intake & Therapies:    Average Meal Intake: Unable to assess  Average Supplements Intake: None Ordered  ADULT TUBE FEEDING; PEG; Renal Formula; Continuous; 20; Yes; 20; Q 4 hours; 40; 30; Q 4 hours  ADULT DIET; Regular; Low Potassium (Less than 3000 mg/day); 1500 ml    Anthropometric Measures:  Height: 175.3 cm (5' 9\")  Ideal Body Weight (IBW): 160 lbs (73 kg)    Admission Body Weight: 99 kg (218 lb 4.1 oz)  Current Body Weight: 96.2 kg (212 lb 1.3 oz), 136.4 % IBW. Weight Source: Bed scale  Current BMI (kg/m2): 31.3  Usual Body Weight: 96.2 kg (212 lb) (12/6/23 chart review)     % Weight Change (Calculated): 3  Weight Adjustment For: No Adjustment                 BMI Categories: Obese Class 1 (BMI 30.0-34.9)    Estimated Daily Nutrient Needs:  Energy Requirements Based On: Kcal/kg  Weight Used for Energy Requirements: Ideal  Energy (kcal/day): 6421-6760 (30-35 kcal/kg IBW for dialysis)  Weight Used for Protein Requirements: Ideal  Protein (g/day): 73-87 (1-1.2 g/kg, may increase as Cr >1.7 in critical care)  Method Used for Fluid Requirements: Standard Renal  Fluid  (ml/day): per nepro    Nutrition Diagnosis:   Moderate malnutrition, In context of social or environmental circumstances (motor issues) related to renal dysfunction (dysphagia, inadequate oral intakes, requiring EN infusion via PEG) as evidenced by nutrition support - enteral nutrition, mild loss of subcutaneous fat, mild muscle loss    Nutrition Interventions:   Food and/or Nutrient Delivery: Continue Current Diet, Continue Current Tube Feeding  Nutrition Education/Counseling: No recommendation at this time  Coordination of Nutrition Care: Continue to monitor while inpatient, Feeding Assistance/Environment Change, Speech Therapy  Plan of Care discussed with: PS MD re: pcm dx and TF o/m for dietitian. MD states \"Yes sure you can do it I want him to take oral too as he was eating before he came here\" referring to TF o/m    Goals:  Goals: Meet at least 75% of estimated needs, prior to discharge  Type of Goal: New goal  Previous Goal Met: Goal(s) Achieved    Nutrition Monitoring and Evaluation:   Behavioral-Environmental Outcomes: None Identified  Food/Nutrient Intake Outcomes: Food and Nutrient Intake, Enteral Nutrition Intake/Tolerance  Physical Signs/Symptoms Outcomes: Chewing or Swallowing, Biochemical Data, Skin, Weight, Meal Time Behavior, GI Status    Discharge Planning:    Enteral Nutrition, Continue current diet     Pascale Riddle RD  Contact: [phone]

## 2024-11-01 NOTE — BRIEF OP NOTE
Brief Postoperative Note      Patient: Tico Day  YOB: 1960  MRN: [id number]    Date of Procedure: 10/31/2024    Successful removal of the infected right sided TDC.      Because the patient was in great discomfort and was not NPO the decision was made to abort the non-tunneled cathter placement today and instead opt of a line holiday.  This was discussed as a possible option with both the patients wife who was the medical decision maker and Dr. Harmon of the primary team prior to proceeding.      There was an Extensive amount of brown pus that was running down the patients chest after catheter removal.    Electronically signed by Harsha Rubio MD on 10/31/2024 at 8:40 PM

## 2024-11-01 NOTE — CONSULTS
Pre-Procedure Note    Patient Name: Tico Day   YOB: 1960  Room/Bed: 2122/2122-A  Medical Record Number: [id number]  Date: 10/31/2024   Time: 8:38 PM       Indication:  Infected right chest wall catheter.    Consent: I have discussed with the patient and/or the patient representative the indication, alternatives, and the possible risks and/or complications of the planned procedure and the anesthesia methods. The patient and/or patient representative appear to understand and agree to proceed.    Vital Signs:   Vitals:    10/31/24 2000   BP: 132/66   Pulse: 83   Resp: 21   Temp: 98.2 °F (36.8 °C)   SpO2:        Past Medical History:   has no past medical history on file.    Past Surgical History:   has no past surgical history on file.    Medications:   Scheduled Meds:    piperacillin-tazobactam  3,375 mg IntraVENous Q8H    epoetin gabriel-epbx  10,000 Units IntraVENous Once per day on Monday Wednesday Friday    miconazole   Topical BID    ALPRAZolam  0.5 mg PEG Tube Once    atorvastatin  40 mg Oral Nightly    gabapentin  100 mg Per G Tube Daily    lansoprazole  30 mg Oral QAM AC    traZODone  50 mg Oral Nightly    midodrine  10 mg Oral TID WC    heparin (porcine)  5,000 Units SubCUTAneous 3 times per day    albuterol  2.5 mg Nebulization Q4H     Continuous Infusions:    sodium chloride      dextrose       PRN Meds: LORazepam **OR** LORazepam, sodium chloride, glucose, dextrose bolus **OR** dextrose bolus, glucagon (rDNA), dextrose, ipratropium 0.5 mg-albuterol 2.5 mg, ALPRAZolam, oxyCODONE, sodium chloride (Inhalant)  Home Meds:   Prior to Admission medications    Medication Sig Start Date End Date Taking? Authorizing Provider   albuterol (PROVENTIL) (2.5 MG/3ML) 0.083% nebulizer solution Inhale 3 mLs into the lungs every 4 hours 8/6/24  Yes Provider, MD Harsh   atorvastatin (LIPITOR) 40 MG tablet 1 tablet by Enteral route nightly 5/20/24  Yes ProviderHarsh MD   loperamide (IMODIUM) 2  MG capsule Take 1 capsule by mouth as needed for Diarrhea 8/6/24  Yes Harsh Malhotra MD   melatonin 3 MG TABS tablet Take 1 tablet by mouth nightly as needed (insomnia) 6/24/24  Yes Harsh Malhotra MD   midodrine (PROAMATINE) 10 MG tablet Take 1 tablet by mouth as needed (Every monday, wednesday, and friday for hypotension) 6/24/24  Yes Harsh Malhotra MD   oxyCODONE (ROXICODONE) 5 MG immediate release tablet 1 tablet by Enteral route every 8 hours as needed. Max Daily Amount: 15 mg 8/6/24  Yes Harsh Malhotra MD   lansoprazole (PREVACID SOLUTAB) 30 MG disintegrating tablet 1 tablet by Enteral route daily 8/7/24  Yes Harsh Malhotra MD   sennosides (SENOKOT) 8.8 MG/5ML syrup 5 mLs by Enteral route daily as needed 8/6/24  Yes Harsh Malhotra MD   sodium chloride, Inhalant, 7 % nebulizer solution Inhale 4 mLs into the lungs in the morning and 4 mLs at noon and 4 mLs in the evening. 8/6/24  Yes Harsh Malhotra MD   traZODone (DESYREL) 50 MG tablet Take 1 tablet by mouth nightly 6/24/24  Yes Harsh Malhotra MD   ALPRAZolam (XANAX) 0.5 MG tablet Take 1 tablet by mouth every 6 hours as needed for Anxiety. Max Daily Amount: 2 mg   Yes Harsh Malhotra MD   ondansetron (ZOFRAN-ODT) 8 MG TBDP disintegrating tablet Take 1 tablet by mouth every 8 hours as needed for Nausea or Vomiting   Yes Harsh Malhotra MD   sucralfate (CARAFATE) 1 GM/10ML suspension 10 mLs by Per G Tube route in the morning, at noon, and at bedtime   Yes Harsh Malhotra MD   acetaminophen (TYLENOL) 500 MG tablet 2 tablets by Enteral route every 8 hours as needed 8/6/24   Harsh Malhotra MD   gabapentin (NEURONTIN) 100 MG capsule 1 capsule by Per G Tube route daily.    Harsh Malhotra MD     Image guided removal of an infected right sided dialysis catheter.  As the patient was not NPO local only could be used safely in the patients condition.    Electronically signed by

## 2024-11-01 NOTE — PROGRESS NOTES
[facility]  DEPARTMENT OF SPEECH/LANGUAGE PATHOLOGY  DAILY PROGRESS NOTE  Tico Day  11/1/2024  [id number]  ESRD (end stage renal disease) (HCC) [N18.6]  Acute on chronic respiratory failure [J96.20]  Acute on chronic hypoxic respiratory failure [J96.21]  Allergies   Allergen Reactions    Reglan [Metoclopramide]     Remeron [Mirtazapine]          Pt was seen this date for dysphagia treatment.       IMPRESSION AND RECOMMENDATIONS:   Tico Day was seen for a bedside swallowing treatment and diet tolerance monitoring.  Pt was alert and cooperative throughout assessment.  Pt had a fiberoptic endoscopic evaluation of swallowing (FEES) on 10/29/24 which indicated no episodes of laryngeal penetration or aspiration identified with all PO trials given.  For today's assessment pt was positioned upright in bed.  He reported limited appetite d/t nausea.  Pt was agreeable to accept limited PO trials of regular solids and thin liquids by straw sips.  Adequate mastication and oral clearance was observed with regular solid trials.  Pharyngeal swallow appeared timely.  Clear vocal quality and 0 overt s/s of aspiration were observed with all PO trials given.     Recommend continue regular solids/thin liquids with aspiration precaution.   Pt would benefit from continued longer-term alternate route of nutrition d/t reduced appetite.  No further acute SLP needs were identified at this time.     GOALS (current status in bold):  Short-term Goals  Timeframe for Short-term Goals: LOS or until goals are met  Goal 1: Pt will tolerate a regular diet and thin liquids in 100% of trials Goal met   Goal 2: Pt/caregivers will demonstrate understanding of SLP recommendations/POC Goal met           EDUCATION: Recommendations/POC    PAIN RATING (0-10 Scale): denies   Time in/Time out: SLP Individual Minutes  Time In: 1045  Time Out: 1100  Minutes: 15    Visit number: 4    Laila Rodriguez MS, CCC-SLP,

## 2024-11-01 NOTE — PROGRESS NOTES
V2.0  [facility] [place] Hospitalist Progress Note      Name:  Tico Day /Age/Sex: 1960  (64 y.o. male)   MRN & CSN:  [id number] & [id number] Encounter Date/Time: 2024 10:55 AM EDT    Location:  -A PCP: Carlos Nina MD       Hospital Day: 8    Assessment and Plan:   Tico Day is a 64 y.o. male with pmh of  ESRD on HD , chronic systolic heart failure, chronic respiratory failure with tracheostomy since , left atrial thrombus, wide-complex tachycardia, CAD with history of PCI in , COPD, anemia of chronic disease, obstructive sleep apnea, anxiety, chronic pain with opioid dependence  who presents with Acute on chronic respiratory failure      Plan:  Tracheostomy dependence  Acute on chronic respiratory failure with hypoxia requiring ventilatory support  Secondary to acute on chronic systolic heart failure, possible pneumonia  Chest x-ray personally reviewed bilateral pulmonary infiltrates R>L and cardiomegaly Pro-BNP 43,703.  Last echocardiogram from 2024 reviewed EF of 40%.  Negative respiratory viral panel  monitor intake and output measure daily weight  Nephrology consultation for inpatient management of HD and volume management  Sputum culture growing Pseudomonas aeruginosa sensitive to Zosyn switched from meropenem to Zosyn; ID team consulted - plan for 2 weeks total coverage, continue zosyn - end date 2024  Not tolerating pressure support ventilation     Possible central line infection  Painful, tender, erythematous central line   ID team evaluated as well- recommending blood culture x 2 from the site by HD nurse - order given  IR team removed TDC and attempted  temp HD placement but failed due to patient intolerance to lie flat. ICU team placed tempory HD line on 2024. TDC placement around Monday.     ESRD on HD   Nephrology consultation for inpatient management of hemodialysis     Elevated troponin secondary to  Right dialysis catheter in good position. Tracheostomy tube in good position. Small right pleural effusion. Moderate diffuse bilateral infiltrate versus edema. There is no evidence of pneumothorax.     1. Moderate diffuse bilateral infiltrate versus edema. 2. Dialysis catheter and tracheostomy tube in good position. 3. Small right pleural effusion. Electronically signed by Dionicio Nj MD      Electronically signed by Archie Harmon MD on 11/1/2024 at 5:18 PM

## 2024-11-01 NOTE — PROCEDURES
PROCEDURE NOTE  Date: 11/1/2024   Name: Tico Day  YOB: 1960    CENTRAL LINE    Date/Time: 11/1/2024 3:33 PM    Performed by: Coby De La Cruz MD  Authorized by: Coby De La Cruz MD  Consent: Verbal consent obtained.  Risks and benefits: risks, benefits and alternatives were discussed  Consent given by: patient  Patient understanding: patient states understanding of the procedure being performed  Patient consent: the patient's understanding of the procedure matches consent given  Patient identity confirmed: arm band and hospital-assigned identification number  Time out: Immediately prior to procedure a \"time out\" was called to verify the correct patient, procedure, equipment, support staff and site/side marked as required.  Indications: dialysis catheter.  Anesthesia: see MAR for details    Anesthesia:  Local Anesthetic: lidocaine 1% without epinephrine    Sedation:  Patient sedated: no    Preparation: skin prepped with ChloraPrep  Skin prep agent dried: skin prep agent completely dried prior to procedure  Sterile barriers: all five maximum sterile barriers used - cap, mask, sterile gown, sterile gloves, and large sterile sheet  Hand hygiene: hand hygiene performed prior to central venous catheter insertion  Location details: left internal jugular  Site selection rationale: concern for ? clot in RIJ  Patient position: flat  Catheter type: triple lumen (trialysis)  Catheter size: 13Fr.  Ultrasound guidance: yes  Sterile ultrasound techniques: sterile gel and sterile probe covers were used  Number of attempts: 1  Successful placement: yes  Post-procedure: line sutured and dressing applied  Assessment: blood return through all ports and free fluid flow  Patient tolerance: patient tolerated the procedure well with no immediate complications  Comments: CXR pending

## 2024-11-01 NOTE — PROGRESS NOTES
Nephrology Progress Note        [address] [facility] [facility], [address]  [place] [address]  Phone: [phone]  Office Hours: 8:30AM - 4:30PM  Monday - Friday 11/1/2024 7:14 AM  Subjective:   Admit Date: 10/25/2024  PCP: Carlos Nina MD  Interval History:   On vent  Interactive  Right tunnelled hd cath removed yesterday with pus leaking out of the tunnel    Diet: ADULT TUBE FEEDING; PEG; Renal Formula; Continuous; 20; Yes; 20; Q 4 hours; 40; 30; Q 4 hours  ADULT DIET; Regular; Low Potassium (Less than 3000 mg/day); 1500 ml      Data:   Scheduled Meds:   piperacillin-tazobactam  3,375 mg IntraVENous Q8H    epoetin gabriel-epbx  10,000 Units IntraVENous Once per day on Monday Wednesday Friday    miconazole   Topical BID    ALPRAZolam  0.5 mg PEG Tube Once    atorvastatin  40 mg Oral Nightly    gabapentin  100 mg Per G Tube Daily    lansoprazole  30 mg Oral QAM AC    traZODone  50 mg Oral Nightly    midodrine  10 mg Oral TID WC    heparin (porcine)  5,000 Units SubCUTAneous 3 times per day    albuterol  2.5 mg Nebulization Q4H     Continuous Infusions:   sodium chloride      dextrose       PRN Meds:LORazepam **OR** LORazepam, sodium chloride, glucose, dextrose bolus **OR** dextrose bolus, glucagon (rDNA), dextrose, ipratropium 0.5 mg-albuterol 2.5 mg, ALPRAZolam, oxyCODONE, sodium chloride (Inhalant)  I/O last 3 completed shifts:  In: 1769.4 [NG/GT:1595; IV Piggyback:174.4]  Out: 300 [Urine:300]  No intake/output data recorded.    Intake/Output Summary (Last 24 hours) at 11/1/2024 0714  Last data filed at 11/1/2024 0700  Gross per 24 hour   Intake 1294.44 ml   Output 300 ml   Net 994.44 ml       CBC:   Recent Labs     10/29/24  1930 10/31/24  0942   WBC  --  6.1   HGB 7.9* 8.6*   PLT  --  176       BMP:    Recent Labs     11/01/24  0532   *   K 3.5   CL 96*   CO2 30   BUN 22*   CREATININE 2.9*   GLUCOSE 90   CALCIUM 11.9*     Hepatic: No results for input(s): \"AST\", \"ALT\", \"BILITOT\", \"ALKPHOS\" in the

## 2024-11-01 NOTE — PROGRESS NOTES
(L) >60 mL/min/1.73m2    Calcium 11.9 (H) 8.3 - 10.6 mg/dL   Magnesium    Collection Time: 11/01/24  5:32 AM   Result Value Ref Range    Magnesium 2.0 1.8 - 2.4 mg/dL   Phosphorus    Collection Time: 11/01/24  5:32 AM   Result Value Ref Range    Phosphorus 3.3 2.5 - 4.9 mg/dL     CULTURE results: Invalid input(s): \"BLOOD CULTURE\", \"URINE CULTURE\", \"SURGICAL CULTURE\"    Diagnosis:  Patient Active Problem List   Diagnosis    Acute on chronic respiratory failure    Moderate malnutrition (HCC)    Pneumonia of both lower lobes due to Pseudomonas species (HCC)    Central line infection    Acute on chronic hypoxic respiratory failure       Active Problems  Principal Problem:    Acute on chronic respiratory failure  Active Problems:    Moderate malnutrition (HCC)    Pneumonia of both lower lobes due to Pseudomonas species (HCC)    Central line infection    Acute on chronic hypoxic respiratory failure  Resolved Problems:    * No resolved hospital problems. *    Electronically signed by: Electronically signed by SILVANA Fagan CNP on 11/1/2024 at 1:14 PM

## 2024-11-02 LAB
ANION GAP SERPL CALCULATED.3IONS-SCNC: 7 MMOL/L (ref 9–17)
BUN SERPL-MCNC: 27 MG/DL (ref 7–20)
CALCIUM SERPL-MCNC: 12.1 MG/DL (ref 8.3–10.6)
CHLORIDE SERPL-SCNC: 97 MMOL/L (ref 99–110)
CO2 SERPL-SCNC: 29 MMOL/L (ref 21–32)
CREAT SERPL-MCNC: 3.4 MG/DL (ref 0.8–1.3)
GFR, ESTIMATED: 19 ML/MIN/1.73M2
GLUCOSE BLD-MCNC: 97 MG/DL (ref 74–99)
GLUCOSE SERPL-MCNC: 64 MG/DL (ref 74–99)
HCT VFR BLD AUTO: 25.9 % (ref 42–52)
HGB BLD-MCNC: 7.8 G/DL (ref 13.5–18)
LACTATE BLDV-SCNC: 0.6 MMOL/L (ref 0.4–2)
MAGNESIUM SERPL-MCNC: 2.1 MG/DL (ref 1.8–2.4)
PHOSPHATE SERPL-MCNC: 3.9 MG/DL (ref 2.5–4.9)
POTASSIUM SERPL-SCNC: 3.7 MMOL/L (ref 3.5–5.1)
SODIUM SERPL-SCNC: 133 MMOL/L (ref 136–145)

## 2024-11-02 PROCEDURE — 36415 COLL VENOUS BLD VENIPUNCTURE: CPT

## 2024-11-02 PROCEDURE — 6370000000 HC RX 637 (ALT 250 FOR IP): Performed by: INTERNAL MEDICINE

## 2024-11-02 PROCEDURE — 80048 BASIC METABOLIC PNL TOTAL CA: CPT

## 2024-11-02 PROCEDURE — 6360000002 HC RX W HCPCS: Performed by: STUDENT IN AN ORGANIZED HEALTH CARE EDUCATION/TRAINING PROGRAM

## 2024-11-02 PROCEDURE — 2060000000 HC ICU INTERMEDIATE R&B

## 2024-11-02 PROCEDURE — 2700000000 HC OXYGEN THERAPY PER DAY

## 2024-11-02 PROCEDURE — 85018 HEMOGLOBIN: CPT

## 2024-11-02 PROCEDURE — 2580000003 HC RX 258: Performed by: PREVENTIVE MEDICINE

## 2024-11-02 PROCEDURE — 83605 ASSAY OF LACTIC ACID: CPT

## 2024-11-02 PROCEDURE — 2580000003 HC RX 258: Performed by: NURSE PRACTITIONER

## 2024-11-02 PROCEDURE — 94640 AIRWAY INHALATION TREATMENT: CPT

## 2024-11-02 PROCEDURE — 85014 HEMATOCRIT: CPT

## 2024-11-02 PROCEDURE — 84100 ASSAY OF PHOSPHORUS: CPT

## 2024-11-02 PROCEDURE — 94761 N-INVAS EAR/PLS OXIMETRY MLT: CPT

## 2024-11-02 PROCEDURE — 6370000000 HC RX 637 (ALT 250 FOR IP): Performed by: STUDENT IN AN ORGANIZED HEALTH CARE EDUCATION/TRAINING PROGRAM

## 2024-11-02 PROCEDURE — 6360000002 HC RX W HCPCS: Performed by: NURSE PRACTITIONER

## 2024-11-02 PROCEDURE — 82962 GLUCOSE BLOOD TEST: CPT

## 2024-11-02 PROCEDURE — 83735 ASSAY OF MAGNESIUM: CPT

## 2024-11-02 RX ORDER — LOPERAMIDE HYDROCHLORIDE 2 MG/1
2 CAPSULE ORAL 4 TIMES DAILY PRN
Status: DISCONTINUED | OUTPATIENT
Start: 2024-11-02 | End: 2024-11-08 | Stop reason: HOSPADM

## 2024-11-02 RX ORDER — 0.9 % SODIUM CHLORIDE 0.9 %
500 INTRAVENOUS SOLUTION INTRAVENOUS ONCE
Status: COMPLETED | OUTPATIENT
Start: 2024-11-02 | End: 2024-11-02

## 2024-11-02 RX ADMIN — LORAZEPAM 1 MG: 1 TABLET ORAL at 16:59

## 2024-11-02 RX ADMIN — HEPARIN SODIUM 5000 UNITS: 5000 INJECTION INTRAVENOUS; SUBCUTANEOUS at 21:12

## 2024-11-02 RX ADMIN — SODIUM CHLORIDE 500 ML: 9 INJECTION, SOLUTION INTRAVENOUS at 01:44

## 2024-11-02 RX ADMIN — ATORVASTATIN CALCIUM 40 MG: 40 TABLET, FILM COATED ORAL at 20:05

## 2024-11-02 RX ADMIN — PIPERACILLIN AND TAZOBACTAM 3375 MG: 3; .375 INJECTION, POWDER, LYOPHILIZED, FOR SOLUTION INTRAVENOUS at 21:01

## 2024-11-02 RX ADMIN — ALBUTEROL SULFATE 2.5 MG: 2.5 SOLUTION RESPIRATORY (INHALATION) at 23:40

## 2024-11-02 RX ADMIN — PIPERACILLIN AND TAZOBACTAM 3375 MG: 3; .375 INJECTION, POWDER, LYOPHILIZED, FOR SOLUTION INTRAVENOUS at 05:29

## 2024-11-02 RX ADMIN — ALPRAZOLAM 0.5 MG: 0.5 TABLET ORAL at 08:22

## 2024-11-02 RX ADMIN — ALBUTEROL SULFATE 2.5 MG: 2.5 SOLUTION RESPIRATORY (INHALATION) at 12:06

## 2024-11-02 RX ADMIN — MICONAZOLE NITRATE: 2 POWDER TOPICAL at 08:22

## 2024-11-02 RX ADMIN — MIDODRINE HYDROCHLORIDE 10 MG: 5 TABLET ORAL at 12:18

## 2024-11-02 RX ADMIN — MIDODRINE HYDROCHLORIDE 10 MG: 5 TABLET ORAL at 16:59

## 2024-11-02 RX ADMIN — ALPRAZOLAM 0.5 MG: 0.5 TABLET ORAL at 14:40

## 2024-11-02 RX ADMIN — ALBUTEROL SULFATE 2.5 MG: 2.5 SOLUTION RESPIRATORY (INHALATION) at 15:59

## 2024-11-02 RX ADMIN — OXYCODONE HYDROCHLORIDE 5 MG: 5 TABLET ORAL at 12:24

## 2024-11-02 RX ADMIN — ALPRAZOLAM 0.5 MG: 0.5 TABLET ORAL at 20:05

## 2024-11-02 RX ADMIN — HEPARIN SODIUM 5000 UNITS: 5000 INJECTION INTRAVENOUS; SUBCUTANEOUS at 05:28

## 2024-11-02 RX ADMIN — TRAZODONE HYDROCHLORIDE 50 MG: 50 TABLET ORAL at 20:05

## 2024-11-02 RX ADMIN — ALBUTEROL SULFATE 2.5 MG: 2.5 SOLUTION RESPIRATORY (INHALATION) at 20:03

## 2024-11-02 RX ADMIN — MICONAZOLE NITRATE: 2 POWDER TOPICAL at 20:06

## 2024-11-02 RX ADMIN — OXYCODONE HYDROCHLORIDE 5 MG: 5 TABLET ORAL at 21:11

## 2024-11-02 RX ADMIN — HEPARIN SODIUM 5000 UNITS: 5000 INJECTION INTRAVENOUS; SUBCUTANEOUS at 14:40

## 2024-11-02 RX ADMIN — ALBUTEROL SULFATE 2.5 MG: 2.5 SOLUTION RESPIRATORY (INHALATION) at 03:50

## 2024-11-02 RX ADMIN — LANSOPRAZOLE 30 MG: 30 TABLET, ORALLY DISINTEGRATING ORAL at 05:46

## 2024-11-02 RX ADMIN — OXYCODONE HYDROCHLORIDE 5 MG: 5 TABLET ORAL at 05:28

## 2024-11-02 RX ADMIN — GABAPENTIN 100 MG: 100 CAPSULE ORAL at 08:22

## 2024-11-02 RX ADMIN — ALBUTEROL SULFATE 2.5 MG: 2.5 SOLUTION RESPIRATORY (INHALATION) at 08:17

## 2024-11-02 RX ADMIN — LOPERAMIDE HYDROCHLORIDE 2 MG: 2 CAPSULE ORAL at 20:05

## 2024-11-02 RX ADMIN — PIPERACILLIN AND TAZOBACTAM 3375 MG: 3; .375 INJECTION, POWDER, LYOPHILIZED, FOR SOLUTION INTRAVENOUS at 12:33

## 2024-11-02 RX ADMIN — LORAZEPAM 1 MG: 1 TABLET ORAL at 10:39

## 2024-11-02 RX ADMIN — MIDODRINE HYDROCHLORIDE 10 MG: 5 TABLET ORAL at 05:28

## 2024-11-02 ASSESSMENT — PULMONARY FUNCTION TESTS
PIF_VALUE: 20
PIF_VALUE: 21
PIF_VALUE: 20
PIF_VALUE: 19
PIF_VALUE: 20
PIF_VALUE: 20
PIF_VALUE: 21
PIF_VALUE: 25
PIF_VALUE: 20
PIF_VALUE: 21
PIF_VALUE: 20
PIF_VALUE: 21
PIF_VALUE: 21
PIF_VALUE: 20
PIF_VALUE: 23
PIF_VALUE: 21
PIF_VALUE: 20
PIF_VALUE: 23
PIF_VALUE: 22

## 2024-11-02 ASSESSMENT — PAIN DESCRIPTION - ORIENTATION
ORIENTATION: LEFT
ORIENTATION: MID

## 2024-11-02 ASSESSMENT — PAIN SCALES - GENERAL
PAINLEVEL_OUTOF10: 0
PAINLEVEL_OUTOF10: 0
PAINLEVEL_OUTOF10: 9
PAINLEVEL_OUTOF10: 0
PAINLEVEL_OUTOF10: 9
PAINLEVEL_OUTOF10: 0
PAINLEVEL_OUTOF10: 2
PAINLEVEL_OUTOF10: 7
PAINLEVEL_OUTOF10: 7
PAINLEVEL_OUTOF10: 0

## 2024-11-02 ASSESSMENT — PAIN DESCRIPTION - LOCATION
LOCATION: BACK;NECK
LOCATION: GENERALIZED
LOCATION: NECK
LOCATION: BACK;NECK

## 2024-11-02 ASSESSMENT — PAIN SCALES - WONG BAKER: WONGBAKER_NUMERICALRESPONSE: HURTS A LITTLE BIT

## 2024-11-02 ASSESSMENT — PAIN DESCRIPTION - DESCRIPTORS
DESCRIPTORS: ACHING

## 2024-11-02 ASSESSMENT — PAIN DESCRIPTION - ONSET: ONSET: ON-GOING

## 2024-11-02 ASSESSMENT — PAIN - FUNCTIONAL ASSESSMENT
PAIN_FUNCTIONAL_ASSESSMENT: ACTIVITIES ARE NOT PREVENTED
PAIN_FUNCTIONAL_ASSESSMENT: ACTIVITIES ARE NOT PREVENTED

## 2024-11-02 ASSESSMENT — PAIN DESCRIPTION - PAIN TYPE: TYPE: CHRONIC PAIN

## 2024-11-02 ASSESSMENT — PAIN DESCRIPTION - FREQUENCY: FREQUENCY: CONTINUOUS

## 2024-11-02 NOTE — PLAN OF CARE
Problem: Safety - Adult  Goal: Free from fall injury  Outcome: Progressing  Flowsheets (Taken 11/2/2024 1337)  Free From Fall Injury:   Instruct family/caregiver on patient safety   Based on caregiver fall risk screen, instruct family/caregiver to ask for assistance with transferring infant if caregiver noted to have fall risk factors     Problem: Discharge Planning  Goal: Discharge to home or other facility with appropriate resources  Outcome: Progressing  Flowsheets (Taken 11/2/2024 1337)  Discharge to home or other facility with appropriate resources:   Identify barriers to discharge with patient and caregiver   Arrange for needed discharge resources and transportation as appropriate   Identify discharge learning needs (meds, wound care, etc)   Refer to discharge planning if patient needs post-hospital services based on physician order or complex needs related to functional status, cognitive ability or social support system   Arrange for interpreters to assist at discharge as needed     Problem: Pain  Goal: Verbalizes/displays adequate comfort level or baseline comfort level  Outcome: Progressing  Flowsheets (Taken 11/2/2024 1337)  Verbalizes/displays adequate comfort level or baseline comfort level:   Encourage patient to monitor pain and request assistance   Assess pain using appropriate pain scale   Administer analgesics based on type and severity of pain and evaluate response   Implement non-pharmacological measures as appropriate and evaluate response   Consider cultural and social influences on pain and pain management   Notify Licensed Independent Practitioner if interventions unsuccessful or patient reports new pain

## 2024-11-02 NOTE — PROGRESS NOTES
V2.0  [facility] [place]      Name:  Tico Day /Age/Sex: 1960  (64 y.o. male)   MRN & CSN:  [id number] & [id number] Encounter Date/Time: 2024 10:55 AM EDT    Location:  -A PCP: Carlos Nina MD       Hospital Day: 9    Assessment and Plan:   Tico Day is a 64 y.o. male with pmh of  ESRD on HD , chronic systolic heart failure, chronic respiratory failure with tracheostomy since , left atrial thrombus, wide-complex tachycardia, CAD with history of PCI in , COPD, anemia of chronic disease, obstructive sleep apnea, anxiety, chronic pain with opioid dependence  who presents with Acute on chronic respiratory failure      Plan:  Tracheostomy dependence  Acute on chronic respiratory failure with hypoxia requiring ventilatory support  Secondary to acute on chronic systolic heart failure, possible pneumonia  Chest x-ray personally reviewed bilateral pulmonary infiltrates R>L and cardiomegaly Pro-BNP 43,703.  Last echocardiogram from 2024 reviewed EF of 40%.  Negative respiratory viral panel  monitor intake and output measure daily weight  Nephrology consultation for inpatient management of HD and volume management  Sputum culture growing Pseudomonas aeruginosa sensitive to Zosyn switched from meropenem to Zosyn; ID team consulted - plan for 2 weeks total coverage, continue zosyn - end date 2024  Not tolerating pressure support ventilation     Infected TDC catheter, proteus mirabilis  Painful, tender, erythematous central line   ID team evaluated as well- recommending blood culture x 2 from the site by HD nurse - order given  IR team removed TDC and attempted  temp HD placement but failed due to patient intolerance to lie flat. ICU team placed tempory HD line on 2024. New TDC placement around Monday/Tuesday.   Await final culture     ESRD on HD   Nephrology consultation for inpatient management of

## 2024-11-02 NOTE — PROGRESS NOTES
Hypotensive, asymptomatic, easy to arouse from resting. Will continue to monitor and make doctor aware.

## 2024-11-02 NOTE — PLAN OF CARE
Problem: Safety - Adult  Goal: Free from fall injury  Outcome: Progressing     Problem: Discharge Planning  Goal: Discharge to home or other facility with appropriate resources  Outcome: Progressing  Flowsheets (Taken 11/1/2024 2000)  Discharge to home or other facility with appropriate resources:   Identify barriers to discharge with patient and caregiver   Arrange for needed discharge resources and transportation as appropriate   Arrange for interpreters to assist at discharge as needed     Problem: Skin/Tissue Integrity  Goal: Absence of new skin breakdown  Description: 1.  Monitor for areas of redness and/or skin breakdown  2.  Assess vascular access sites hourly  3.  Every 4-6 hours minimum:  Change oxygen saturation probe site  4.  Every 4-6 hours:  If on nasal continuous positive airway pressure, respiratory therapy assess nares and determine need for appliance change or resting period.  Outcome: Progressing     Problem: Pain  Goal: Verbalizes/displays adequate comfort level or baseline comfort level  Outcome: Progressing  Flowsheets (Taken 11/1/2024 2000)  Verbalizes/displays adequate comfort level or baseline comfort level:   Encourage patient to monitor pain and request assistance   Assess pain using appropriate pain scale   Implement non-pharmacological measures as appropriate and evaluate response     Problem: Nutrition Deficit:  Goal: Optimize nutritional status  11/1/2024 2256 by Jessie Sapp, RN  Outcome: Progressing  11/1/2024 1403 by Pascale Riddle, RD  Outcome: Progressing     Problem: Neurosensory - Adult  Goal: Achieves maximal functionality and self care  Outcome: Progressing  Flowsheets (Taken 11/1/2024 2000)  Achieves maximal functionality and self care:   Monitor swallowing and airway patency with patient fatigue and changes in neurological status   Encourage and assist patient to increase activity and self care with guidance from physical therapy/occupational therapy   Encourage  ordered   Monitor response to electrolyte replacements, including repeat lab results as appropriate  Goal: Hemodynamic stability and optimal renal function maintained  Outcome: Progressing  Flowsheets (Taken 11/1/2024 2000)  Hemodynamic stability and optimal renal function maintained:   Monitor labs and assess for signs and symptoms of volume excess or deficit   Monitor intake, output and patient weight   Monitor urine specific gravity, serum osmolarity and serum sodium as indicated or ordered   Monitor response to interventions for patient's volume status, including labs, urine output, blood pressure (other measures as available)  Goal: Glucose maintained within prescribed range  Outcome: Progressing  Flowsheets (Taken 11/1/2024 2000)  Glucose maintained within prescribed range:   Monitor blood glucose as ordered   Administer ordered medications to maintain glucose within target range   Assess for signs and symptoms of hyperglycemia and hypoglycemia   Assess barriers to adequate nutritional intake and initiate nutrition consult as needed     Problem: Hematologic - Adult  Goal: Maintains hematologic stability  Outcome: Progressing  Flowsheets (Taken 11/1/2024 2000)  Maintains hematologic stability:   Assess for signs and symptoms of bleeding or hemorrhage   Monitor labs for bleeding or clotting disorders   Administer blood products/factors as ordered

## 2024-11-02 NOTE — PROGRESS NOTES
Patient Name: Tico Day  Patient : 1960  MRN: [id number]     Acct: [id number]  Date of Admission: 10/25/2024  Room/Bed: /  Code Status:  Full Code  Allergies:   Allergies   Allergen Reactions    Reglan [Metoclopramide]     Remeron [Mirtazapine]      Diagnosis:    Patient Active Problem List   Diagnosis    Acute on chronic respiratory failure    Moderate malnutrition (HCC)    Pneumonia of both lower lobes due to Pseudomonas species (HCC)    Central line infection    Acute on chronic hypoxic respiratory failure         Treatment:  Hemodialysis 1:1  Priority: Routine  Location: ICU    Diabetic: No  NPO: No  Isolation Precautions: Contact     Consent for Treatment Verified: Yes  Blood Consent Verified: Not Applicable     Safety Verified: Identify (I), Consent (C), Equipment (E), HepB Status (B), Orders Complete (O), Access Verified (A), and Timeliness (T)  Time out performed prior to access at 0850 hours.    Report Received from Primary RN at 0800 hours.  Primary RN (First Initial, Last Name, Title): OK Nuñez RN  Incapacitated Nurse Education Completed: Yes     HBsAg ONLY:  Date Drawn: 2024       Results: Negative  HBsAb:  Date Drawn:  2024       Results: Susceptible <10    Order  Dialysis Bath  K+ (Potassium): 4 (order changed per Dr. Hoskins)  Ca+ (Calcium): 2.5  Na+ (Sodium): 138  HCO3 (Bicarb): 35  Bicarbonate Concentrate Lot No.: [id number]  Acid Concentrate Lot No.: [id number]     Na+ Modeling: Not Applicable  Dialyzer: F180  Dialysate Temperature (C):  36  Blood Flow Rate (BFR):  350   Dialysate Flow Rate (DFR):   700        Access to be Utilized   Access: Non-tunneled Catheter  Location: Internal Jugular  Side: Left   Needle gauge:  Not Applicable  + Bruit/Thrill: Not Applicable    First Use X-ray Verified: Yes  OK to use line order: Yes    Site Assessment:  Signs and Symptoms of Infection/Inflammation: None  If yes: Not Applicable  Dressing: Dry and Intact  Site Prep:  30 700 tx complete Yes     Vital Signs  Patient Vitals for the past 8 hrs:   BP Temp Pulse Resp SpO2 Weight Weight Method Percent Weight Change   11/02/24 0500 (!) 98/59 -- 71 16 97 % 99.4 kg (219 lb 2.2 oz) Bed scale 0   11/02/24 0529 -- -- 76 10 -- -- -- --   11/02/24 0558 -- -- -- 12 -- -- -- --   11/02/24 0600 (!) 114/59 -- 77 12 95 % -- -- --   11/02/24 0800 (!) 108/57 98.3 °F (36.8 °C) 71 18 97 % -- -- --   11/02/24 0818 -- -- 72 19 98 % -- -- --   11/02/24 0832 115/60 98.3 °F (36.8 °C) 74 20 98 % -- -- --   11/02/24 0845 116/60 -- 78 19 99 % -- -- --   11/02/24 0900 111/63 -- 71 17 98 % -- -- --   11/02/24 0915 (!) 106/59 -- 73 18 100 % -- -- --   11/02/24 0930 (!) 102/56 -- 71 19 99 % -- -- --   11/02/24 0945 (!) 93/53 -- 70 (!) 0 99 % -- -- --   11/02/24 1000 (!) 98/57 -- 72 19 97 % -- -- --   11/02/24 1015 (!) 108/56 -- 80 13 100 % -- -- --   11/02/24 1030 117/62 -- 79 21 100 % -- -- --   11/02/24 1045 105/66 -- 79 19 100 % -- -- --   11/02/24 1100 (!) 92/53 -- 70 19 100 % -- -- --   11/02/24 1115 (!) 86/53 -- 68 19 100 % -- -- --   11/02/24 1130 (!) 91/52 -- 69 20 98 % -- -- --   11/02/24 1145 (!) 88/52 -- 68 18 99 % -- -- --   11/02/24 1200 (!) 106/59 -- 74 21 100 % -- -- --   11/02/24 1215 (!) 104/56 97.1 °F (36.2 °C) 72 21 100 % -- -- --     Post-Dialysis  Arterial Catheter Locking Solution:  1.4 ml NS  Venous Catheter Locking Solution:  1.4 ml NS  Post-Treatment Procedures: Blood returned, Catheter Capped, clamped with Saline x2 ports  Machine Disinfection Process: Acid/Vinegar Clean, Heat Disinfect, Exterior Machine Disinfection  Rinseback Volume (ml): 250 ml  Blood Volume Processed (Liters): 60.1 L  Dialyzer Clearance: Clear  Duration of Treatment (minutes): 3 minutes     Hemodialysis Intake (ml): 500 ml  Hemodialysis Output (ml): 255 ml     Tolerated Treatment: Fair  Patient Response to Treatment: stable  Physician Notified: No       Provider Notification        Handoff complete and report given to

## 2024-11-02 NOTE — PROGRESS NOTES
Nephrology Progress Note        [address], [address]  [place] [address]  Phone: [phone]  Office Hours: 8:30AM - 4:30PM  Monday - Friday 11/2/2024 8:40 AM  Subjective:   Admit Date: 10/25/2024  PCP: Carlos Nina MD  Interval History:   On vent  Left temp HD cath present    Diet: ADULT TUBE FEEDING; PEG; Renal Formula; Continuous; 20; Yes; 20; Q 4 hours; 40; 30; Q 4 hours  ADULT DIET; Regular; Low Potassium (Less than 3000 mg/day); 1500 ml      Data:   Scheduled Meds:   piperacillin-tazobactam  3,375 mg IntraVENous Q8H    epoetin gabriel-epbx  10,000 Units IntraVENous Once per day on Monday Wednesday Friday    miconazole   Topical BID    ALPRAZolam  0.5 mg PEG Tube Once    atorvastatin  40 mg Oral Nightly    gabapentin  100 mg Per G Tube Daily    lansoprazole  30 mg Oral QAM AC    traZODone  50 mg Oral Nightly    midodrine  10 mg Oral TID WC    heparin (porcine)  5,000 Units SubCUTAneous 3 times per day    albuterol  2.5 mg Nebulization Q4H     Continuous Infusions:   propofol Stopped (11/01/24 1545)    sodium chloride      dextrose       PRN Meds:LORazepam **OR** LORazepam, sodium chloride, glucose, dextrose bolus **OR** dextrose bolus, glucagon (rDNA), dextrose, ipratropium 0.5 mg-albuterol 2.5 mg, ALPRAZolam, oxyCODONE, sodium chloride (Inhalant)  I/O last 3 completed shifts:  In: 2377.7 [NG/GT:1776; IV Piggyback:601.7]  Out: 870 [Urine:870]  No intake/output data recorded.    Intake/Output Summary (Last 24 hours) at 11/2/2024 0840  Last data filed at 11/2/2024 0529  Gross per 24 hour   Intake 1681.41 ml   Output 570 ml   Net 1111.41 ml       CBC:   Recent Labs     10/31/24  0942 11/02/24 0319   WBC 6.1  --    HGB 8.6* 7.8*     --        BMP:    Recent Labs     11/01/24  0532 11/02/24 0319   * 133*   K 3.5 3.7   CL 96* 97*   CO2 30 29   BUN 22* 27*   CREATININE 2.9* 3.4*   GLUCOSE 90 64*   CALCIUM 11.9* 12.1*           Objective:   Vitals: /60   Pulse 74   Temp

## 2024-11-03 LAB
ANION GAP SERPL CALCULATED.3IONS-SCNC: 6 MMOL/L (ref 9–17)
BUN SERPL-MCNC: 17 MG/DL (ref 7–20)
CALCIUM SERPL-MCNC: 11.8 MG/DL (ref 8.3–10.6)
CHLORIDE SERPL-SCNC: 100 MMOL/L (ref 99–110)
CO2 SERPL-SCNC: 28 MMOL/L (ref 21–32)
CREAT SERPL-MCNC: 2.5 MG/DL (ref 0.8–1.3)
GFR, ESTIMATED: 26 ML/MIN/1.73M2
GLUCOSE BLD-MCNC: 88 MG/DL (ref 74–99)
GLUCOSE SERPL-MCNC: 91 MG/DL (ref 74–99)
MAGNESIUM SERPL-MCNC: 2 MG/DL (ref 1.8–2.4)
MICROORGANISM SPEC CULT: ABNORMAL
MICROORGANISM SPEC CULT: ABNORMAL
PHOSPHATE SERPL-MCNC: 3 MG/DL (ref 2.5–4.9)
POTASSIUM SERPL-SCNC: 3.8 MMOL/L (ref 3.5–5.1)
SERVICE CMNT-IMP: ABNORMAL
SODIUM SERPL-SCNC: 134 MMOL/L (ref 136–145)
SPECIMEN DESCRIPTION: ABNORMAL
TSH SERPL DL<=0.05 MIU/L-ACNC: 3.98 UIU/ML (ref 0.27–4.2)

## 2024-11-03 PROCEDURE — 6370000000 HC RX 637 (ALT 250 FOR IP): Performed by: STUDENT IN AN ORGANIZED HEALTH CARE EDUCATION/TRAINING PROGRAM

## 2024-11-03 PROCEDURE — 6370000000 HC RX 637 (ALT 250 FOR IP): Performed by: INTERNAL MEDICINE

## 2024-11-03 PROCEDURE — 2580000003 HC RX 258: Performed by: NURSE PRACTITIONER

## 2024-11-03 PROCEDURE — 6360000002 HC RX W HCPCS: Performed by: STUDENT IN AN ORGANIZED HEALTH CARE EDUCATION/TRAINING PROGRAM

## 2024-11-03 PROCEDURE — 94640 AIRWAY INHALATION TREATMENT: CPT

## 2024-11-03 PROCEDURE — 83735 ASSAY OF MAGNESIUM: CPT

## 2024-11-03 PROCEDURE — 84443 ASSAY THYROID STIM HORMONE: CPT

## 2024-11-03 PROCEDURE — 94003 VENT MGMT INPAT SUBQ DAY: CPT

## 2024-11-03 PROCEDURE — 89220 SPUTUM SPECIMEN COLLECTION: CPT

## 2024-11-03 PROCEDURE — 84100 ASSAY OF PHOSPHORUS: CPT

## 2024-11-03 PROCEDURE — 6360000002 HC RX W HCPCS: Performed by: NURSE PRACTITIONER

## 2024-11-03 PROCEDURE — 82962 GLUCOSE BLOOD TEST: CPT

## 2024-11-03 PROCEDURE — 94761 N-INVAS EAR/PLS OXIMETRY MLT: CPT

## 2024-11-03 PROCEDURE — 80048 BASIC METABOLIC PNL TOTAL CA: CPT

## 2024-11-03 PROCEDURE — 2060000000 HC ICU INTERMEDIATE R&B

## 2024-11-03 PROCEDURE — 2700000000 HC OXYGEN THERAPY PER DAY

## 2024-11-03 PROCEDURE — 36415 COLL VENOUS BLD VENIPUNCTURE: CPT

## 2024-11-03 RX ADMIN — PIPERACILLIN AND TAZOBACTAM 3375 MG: 3; .375 INJECTION, POWDER, LYOPHILIZED, FOR SOLUTION INTRAVENOUS at 05:03

## 2024-11-03 RX ADMIN — ALPRAZOLAM 0.5 MG: 0.5 TABLET ORAL at 17:32

## 2024-11-03 RX ADMIN — ALBUTEROL SULFATE 2.5 MG: 2.5 SOLUTION RESPIRATORY (INHALATION) at 23:14

## 2024-11-03 RX ADMIN — ALBUTEROL SULFATE 2.5 MG: 2.5 SOLUTION RESPIRATORY (INHALATION) at 04:02

## 2024-11-03 RX ADMIN — TRAZODONE HYDROCHLORIDE 50 MG: 50 TABLET ORAL at 21:17

## 2024-11-03 RX ADMIN — PIPERACILLIN AND TAZOBACTAM 3375 MG: 3; .375 INJECTION, POWDER, LYOPHILIZED, FOR SOLUTION INTRAVENOUS at 21:45

## 2024-11-03 RX ADMIN — LORAZEPAM 1 MG: 1 TABLET ORAL at 21:17

## 2024-11-03 RX ADMIN — ALBUTEROL SULFATE 2.5 MG: 2.5 SOLUTION RESPIRATORY (INHALATION) at 16:23

## 2024-11-03 RX ADMIN — ATORVASTATIN CALCIUM 40 MG: 40 TABLET, FILM COATED ORAL at 21:17

## 2024-11-03 RX ADMIN — ALPRAZOLAM 0.5 MG: 0.5 TABLET ORAL at 06:06

## 2024-11-03 RX ADMIN — ALBUTEROL SULFATE 2.5 MG: 2.5 SOLUTION RESPIRATORY (INHALATION) at 08:24

## 2024-11-03 RX ADMIN — LANSOPRAZOLE 30 MG: 30 TABLET, ORALLY DISINTEGRATING ORAL at 05:57

## 2024-11-03 RX ADMIN — OXYCODONE HYDROCHLORIDE 5 MG: 5 TABLET ORAL at 10:34

## 2024-11-03 RX ADMIN — LOPERAMIDE HYDROCHLORIDE 2 MG: 2 CAPSULE ORAL at 21:17

## 2024-11-03 RX ADMIN — MICONAZOLE NITRATE: 2 POWDER TOPICAL at 21:22

## 2024-11-03 RX ADMIN — GABAPENTIN 100 MG: 100 CAPSULE ORAL at 08:38

## 2024-11-03 RX ADMIN — PIPERACILLIN AND TAZOBACTAM 3375 MG: 3; .375 INJECTION, POWDER, LYOPHILIZED, FOR SOLUTION INTRAVENOUS at 13:12

## 2024-11-03 RX ADMIN — OXYCODONE HYDROCHLORIDE 5 MG: 5 TABLET ORAL at 04:15

## 2024-11-03 RX ADMIN — ALBUTEROL SULFATE 2.5 MG: 2.5 SOLUTION RESPIRATORY (INHALATION) at 19:27

## 2024-11-03 RX ADMIN — MIDODRINE HYDROCHLORIDE 10 MG: 5 TABLET ORAL at 12:14

## 2024-11-03 RX ADMIN — LORAZEPAM 1 MG: 1 TABLET ORAL at 11:02

## 2024-11-03 RX ADMIN — HEPARIN SODIUM 5000 UNITS: 5000 INJECTION INTRAVENOUS; SUBCUTANEOUS at 05:57

## 2024-11-03 RX ADMIN — LOPERAMIDE HYDROCHLORIDE 2 MG: 2 CAPSULE ORAL at 11:02

## 2024-11-03 RX ADMIN — MIDODRINE HYDROCHLORIDE 10 MG: 5 TABLET ORAL at 04:14

## 2024-11-03 RX ADMIN — ALBUTEROL SULFATE 2.5 MG: 2.5 SOLUTION RESPIRATORY (INHALATION) at 12:18

## 2024-11-03 RX ADMIN — OXYCODONE HYDROCHLORIDE 5 MG: 5 TABLET ORAL at 22:29

## 2024-11-03 RX ADMIN — HEPARIN SODIUM 5000 UNITS: 5000 INJECTION INTRAVENOUS; SUBCUTANEOUS at 14:19

## 2024-11-03 RX ADMIN — MICONAZOLE NITRATE: 2 POWDER TOPICAL at 08:44

## 2024-11-03 RX ADMIN — HEPARIN SODIUM 5000 UNITS: 5000 INJECTION INTRAVENOUS; SUBCUTANEOUS at 21:17

## 2024-11-03 RX ADMIN — OXYCODONE HYDROCHLORIDE 5 MG: 5 TABLET ORAL at 16:18

## 2024-11-03 RX ADMIN — LOPERAMIDE HYDROCHLORIDE 2 MG: 2 CAPSULE ORAL at 04:14

## 2024-11-03 ASSESSMENT — PAIN SCALES - GENERAL
PAINLEVEL_OUTOF10: 7
PAINLEVEL_OUTOF10: 9
PAINLEVEL_OUTOF10: 7
PAINLEVEL_OUTOF10: 2
PAINLEVEL_OUTOF10: 9
PAINLEVEL_OUTOF10: 0
PAINLEVEL_OUTOF10: 2
PAINLEVEL_OUTOF10: 9
PAINLEVEL_OUTOF10: 0

## 2024-11-03 ASSESSMENT — PULMONARY FUNCTION TESTS
PIF_VALUE: 20
PIF_VALUE: 20
PIF_VALUE: 19
PIF_VALUE: 20
PIF_VALUE: 22
PIF_VALUE: 19
PIF_VALUE: 21
PIF_VALUE: 19
PIF_VALUE: 18
PIF_VALUE: 19
PIF_VALUE: 18
PIF_VALUE: 26
PIF_VALUE: 24
PIF_VALUE: 19
PIF_VALUE: 20
PIF_VALUE: 21
PIF_VALUE: 18
PIF_VALUE: 19
PIF_VALUE: 21
PIF_VALUE: 20
PIF_VALUE: 20

## 2024-11-03 ASSESSMENT — PAIN DESCRIPTION - ONSET
ONSET: ON-GOING
ONSET: ON-GOING

## 2024-11-03 ASSESSMENT — PAIN DESCRIPTION - LOCATION
LOCATION: BACK
LOCATION: BACK
LOCATION: OTHER (COMMENT)
LOCATION: BACK
LOCATION: OTHER (COMMENT)
LOCATION: GENERALIZED

## 2024-11-03 ASSESSMENT — PAIN - FUNCTIONAL ASSESSMENT
PAIN_FUNCTIONAL_ASSESSMENT: PREVENTS OR INTERFERES SOME ACTIVE ACTIVITIES AND ADLS
PAIN_FUNCTIONAL_ASSESSMENT: ACTIVITIES ARE NOT PREVENTED

## 2024-11-03 ASSESSMENT — PAIN DESCRIPTION - ORIENTATION
ORIENTATION: MID
ORIENTATION: MID
ORIENTATION: OTHER (COMMENT)
ORIENTATION: OTHER (COMMENT)
ORIENTATION: MID

## 2024-11-03 ASSESSMENT — PAIN DESCRIPTION - FREQUENCY
FREQUENCY: CONTINUOUS

## 2024-11-03 ASSESSMENT — PAIN DESCRIPTION - DESCRIPTORS
DESCRIPTORS: ACHING
DESCRIPTORS: OTHER (COMMENT)
DESCRIPTORS: OTHER (COMMENT)
DESCRIPTORS: ACHING;NAGGING
DESCRIPTORS: ACHING;NAGGING
DESCRIPTORS: ACHING

## 2024-11-03 ASSESSMENT — PAIN DESCRIPTION - PAIN TYPE
TYPE: CHRONIC PAIN
TYPE: CHRONIC PAIN

## 2024-11-03 NOTE — PROGRESS NOTES
tenderness  Musculoskeletal: No edema  Skin: warm, dry  Neuro: Alert oriented x 3  Psych: appropriate mood    Medications:   Medications:    piperacillin-tazobactam  3,375 mg IntraVENous Q8H    epoetin gabriel-epbx  10,000 Units IntraVENous Once per day on Monday Wednesday Friday    miconazole   Topical BID    ALPRAZolam  0.5 mg PEG Tube Once    atorvastatin  40 mg Oral Nightly    gabapentin  100 mg Per G Tube Daily    lansoprazole  30 mg Oral QAM AC    traZODone  50 mg Oral Nightly    midodrine  10 mg Oral TID WC    heparin (porcine)  5,000 Units SubCUTAneous 3 times per day    albuterol  2.5 mg Nebulization Q4H      Infusions:    propofol Stopped (11/01/24 1545)    sodium chloride      dextrose       PRN Meds: loperamide, 2 mg, 4x Daily PRN  LORazepam, 1 mg, Q4H PRN   Or  LORazepam, 1 mg, Q4H PRN  sodium chloride, , PRN  glucose, 4 tablet, PRN  dextrose bolus, 125 mL, PRN   Or  dextrose bolus, 250 mL, PRN  glucagon (rDNA), 1 mg, PRN  dextrose, , Continuous PRN  ipratropium 0.5 mg-albuterol 2.5 mg, 1 Dose, Q4H PRN  ALPRAZolam, 0.5 mg, Q6H PRN  oxyCODONE, 5 mg, Q6H PRN  sodium chloride (Inhalant), 4 mL, PRN        Labs      Recent Results (from the past 24 hour(s))   Basic Metabolic Panel    Collection Time: 11/03/24  4:41 AM   Result Value Ref Range    Sodium 134 (L) 136 - 145 mmol/L    Potassium 3.8 3.5 - 5.1 mmol/L    Chloride 100 99 - 110 mmol/L    CO2 28 21 - 32 mmol/L    Anion Gap 6 (L) 9 - 17 mmol/L    Glucose 91 74 - 99 mg/dL    BUN 17 7 - 20 mg/dL    Creatinine 2.5 (H) 0.8 - 1.3 mg/dL    Est, Glom Filt Rate 26 (L) >60 mL/min/1.73m2    Calcium 11.8 (H) 8.3 - 10.6 mg/dL   Magnesium    Collection Time: 11/03/24  4:41 AM   Result Value Ref Range    Magnesium 2.0 1.8 - 2.4 mg/dL   Phosphorus    Collection Time: 11/03/24  4:41 AM   Result Value Ref Range    Phosphorus 3.0 2.5 - 4.9 mg/dL   TSH w/reflex to FT4    Collection Time: 11/03/24  4:41 AM   Result Value Ref Range    TSH 3.98 0.27 - 4.20 uIU/mL     silhouette upper limits normal for size. Right dialysis catheter in good position. Tracheostomy tube in good position. Small right pleural effusion. Moderate diffuse bilateral infiltrate versus edema. There is no evidence of pneumothorax.     1. Moderate diffuse bilateral infiltrate versus edema. 2. Dialysis catheter and tracheostomy tube in good position. 3. Small right pleural effusion. Electronically signed by Dionicio Nj MD      Electronically signed by Archie Harmon MD on 11/3/2024 at 4:39 PM

## 2024-11-03 NOTE — PROGRESS NOTES
11/03/24 0825   Patient Observation   Pulse 66   Respirations 17   SpO2 98 %   Vent Information   Vent Mode AC/PRVC   $Ventilation $Subsequent Day   Ventilator Settings   FiO2  30 %   Vt (Set, mL) 450 mL   Resp Rate (Set) 16 bpm   PEEP/CPAP (cmH2O) 5   Vent Patient Data (Readings)   Vt (Measured) 460 mL   Peak Inspiratory Pressure (cmH2O) 18 cmH2O   Rate Measured 17 br/min   Minute Volume (L/min) 8.37 Liters   Mean Airway Pressure (cmH2O) 9 cmH20   Plateau Pressure (cm H2O) 18 cm H2O   Driving Pressure 13   I:E Ratio 1:2.50   Flow Sensitivity 3 L/min   I Time/ I Time % 1 s   Backup Apnea On   Backup Rate 16 Breaths Per Minute   Backup Vt 450   Vent Alarm Settings   High Pressure (cmH2O) 45 cmH2O   Low Minute Volume (lpm) 2.5 L/min   High Minute Volume (lpm) 20 L/min   Low Exhaled Vt (ml) 250 mL   High Exhaled Vt (ml) 1000 mL   RR High (bpm) 40 br/min   Apnea (secs) 20 secs   Additional Respiratoray Assessments   Humidification Source HME   Ambu Bag With Mask At Bedside Yes   Surgical Airway  10/29/24   Placement Date/Time: 10/29/24 0700   Present on Admission/Arrival: Yes  Surgical Airway Type: Tracheostomy   Status Secured   Ties Assessment Secure;Intact   Spare Trach at Bedside Yes   Spare Trach Tube One Size Smaller at Bedside Yes   Ambu Bag With Mask at Bedside Yes

## 2024-11-03 NOTE — PLAN OF CARE
Problem: Safety - Adult  Goal: Free from fall injury  Outcome: Progressing     Problem: Discharge Planning  Goal: Discharge to home or other facility with appropriate resources  Outcome: Progressing  Flowsheets (Taken 11/2/2024 2000)  Discharge to home or other facility with appropriate resources:   Identify barriers to discharge with patient and caregiver   Arrange for needed discharge resources and transportation as appropriate   Identify discharge learning needs (meds, wound care, etc)     Problem: Skin/Tissue Integrity  Goal: Absence of new skin breakdown  Description: 1.  Monitor for areas of redness and/or skin breakdown  2.  Assess vascular access sites hourly  3.  Every 4-6 hours minimum:  Change oxygen saturation probe site  4.  Every 4-6 hours:  If on nasal continuous positive airway pressure, respiratory therapy assess nares and determine need for appliance change or resting period.  Outcome: Progressing     Problem: Pain  Goal: Verbalizes/displays adequate comfort level or baseline comfort level  Outcome: Progressing  Flowsheets (Taken 11/2/2024 2000)  Verbalizes/displays adequate comfort level or baseline comfort level:   Encourage patient to monitor pain and request assistance   Assess pain using appropriate pain scale   Administer analgesics based on type and severity of pain and evaluate response   Implement non-pharmacological measures as appropriate and evaluate response   Consider cultural and social influences on pain and pain management   Notify Licensed Independent Practitioner if interventions unsuccessful or patient reports new pain     Problem: Nutrition Deficit:  Goal: Optimize nutritional status  Outcome: Progressing     Problem: Neurosensory - Adult  Goal: Achieves maximal functionality and self care  Outcome: Progressing  Flowsheets (Taken 11/2/2024 2000)  Achieves maximal functionality and self care:   Monitor swallowing and airway patency with patient fatigue and changes in  response to electrolyte replacements, including repeat lab results as appropriate  Goal: Hemodynamic stability and optimal renal function maintained  Outcome: Progressing  Flowsheets (Taken 11/2/2024 2000)  Hemodynamic stability and optimal renal function maintained:   Monitor labs and assess for signs and symptoms of volume excess or deficit   Monitor intake, output and patient weight  Goal: Glucose maintained within prescribed range  Outcome: Progressing  Flowsheets (Taken 11/2/2024 2000)  Glucose maintained within prescribed range:   Monitor blood glucose as ordered   Assess for signs and symptoms of hyperglycemia and hypoglycemia   Administer ordered medications to maintain glucose within target range   Assess barriers to adequate nutritional intake and initiate nutrition consult as needed   Instruct patient on self management of diabetes and initiate consult as needed     Problem: Hematologic - Adult  Goal: Maintains hematologic stability  Outcome: Progressing  Flowsheets (Taken 11/2/2024 2000)  Maintains hematologic stability:   Assess for signs and symptoms of bleeding or hemorrhage   Monitor labs for bleeding or clotting disorders   Administer blood products/factors as ordered

## 2024-11-03 NOTE — PROGRESS NOTES
11/03/24 1218   Patient Observation   Pulse 69   Respirations 17   SpO2 97 %   Vent Information   Equipment Changed HME   Vent Mode AC/PRVC   Ventilator Settings   FiO2  30 %   Vt (Set, mL) 450 mL   Resp Rate (Set) 16 bpm   PEEP/CPAP (cmH2O) 5   Vent Patient Data (Readings)   Vt (Measured) 482 mL   Peak Inspiratory Pressure (cmH2O) 19 cmH2O   Rate Measured 17 br/min   Minute Volume (L/min) 8.01 Liters   Mean Airway Pressure (cmH2O) 9.1 cmH20   Plateau Pressure (cm H2O) 18 cm H2O   Driving Pressure 13   I:E Ratio 1:2.80   Flow Sensitivity 3 L/min   I Time/ I Time % 1 s   Backup Apnea On   Backup Rate 16 Breaths Per Minute   Backup Vt 450   Vent Alarm Settings   High Pressure (cmH2O) 45 cmH2O   Low Minute Volume (lpm) 2.5 L/min   High Minute Volume (lpm) 20 L/min   Low Exhaled Vt (ml) 250 mL   High Exhaled Vt (ml) 1000 mL   RR High (bpm) 40 br/min   Apnea (secs) 20 secs   Additional Respiratoray Assessments   Humidification Source HME   Ambu Bag With Mask At Bedside Yes   Airway Clearance   Suction Trach   Suction Device Inline suction catheter   Sputum Method Obtained Tracheal   Sputum Amount Scant   Sputum Color/Odor White   Sputum Consistency Thick   $Obtained Sample $Induced Sputum (charge not used for Bronchoscopy)   Surgical Airway  10/29/24   Placement Date/Time: 10/29/24 0700   Present on Admission/Arrival: Yes  Surgical Airway Type: Tracheostomy   Status Secured   Ties Assessment Secure;Intact   Spare Trach at Bedside Yes   Spare Trach Tube One Size Smaller at Bedside Yes   Ambu Bag With Mask at Bedside Yes

## 2024-11-03 NOTE — PROGRESS NOTES
Nephrology Progress Note        [address] [facility], [address]  [place] [address]  Phone: [phone]  Office Hours: 8:30AM - 4:30PM  Monday - Friday        11/3/2024 6:36 AM  Subjective:   Admit Date: 10/25/2024  PCP: Carlos Nina MD  Interval History: on vent  Alert and awake    Diet: ADULT TUBE FEEDING; PEG; Renal Formula; Continuous; 20; Yes; 20; Q 4 hours; 40; 30; Q 4 hours  ADULT DIET; Regular; Low Potassium (Less than 3000 mg/day); 1500 ml      Data:   Scheduled Meds:   piperacillin-tazobactam  3,375 mg IntraVENous Q8H    epoetin gabriel-epbx  10,000 Units IntraVENous Once per day on Monday Wednesday Friday    miconazole   Topical BID    ALPRAZolam  0.5 mg PEG Tube Once    atorvastatin  40 mg Oral Nightly    gabapentin  100 mg Per G Tube Daily    lansoprazole  30 mg Oral QAM AC    traZODone  50 mg Oral Nightly    midodrine  10 mg Oral TID WC    heparin (porcine)  5,000 Units SubCUTAneous 3 times per day    albuterol  2.5 mg Nebulization Q4H     Continuous Infusions:   propofol Stopped (11/01/24 1545)    sodium chloride      dextrose       PRN Meds:loperamide, LORazepam **OR** LORazepam, sodium chloride, glucose, dextrose bolus **OR** dextrose bolus, glucagon (rDNA), dextrose, ipratropium 0.5 mg-albuterol 2.5 mg, ALPRAZolam, oxyCODONE, sodium chloride (Inhalant)  I/O last 3 completed shifts:  In: 2731.4 [NG/GT:1706; IV Piggyback:525.4]  Out: 975 [Urine:720]  I/O this shift:  In: 628 [NG/GT:628]  Out: -     Intake/Output Summary (Last 24 hours) at 11/3/2024 0636  Last data filed at 11/3/2024 0400  Gross per 24 hour   Intake 1678 ml   Output 405 ml   Net 1273 ml       CBC:   Recent Labs     10/31/24  0942 11/02/24  0319   WBC 6.1  --    HGB 8.6* 7.8*     --        BMP:    Recent Labs     11/01/24  0532 11/02/24  0319 11/03/24  0441   * 133* 134*   K 3.5 3.7 3.8   CL 96* 97* 100   CO2 30 29 28   BUN 22* 27* 17   CREATININE 2.9* 3.4* 2.5*   GLUCOSE 90 64* 91   CALCIUM 11.9* 12.1* 11.8*            Objective:   Vitals: BP (!) 93/58   Pulse 69   Temp 98.8 °F (37.1 °C) (Oral)   Resp 18   Ht 1.753 m (5' 9\")   Wt 98.2 kg (216 lb 7.9 oz)   SpO2 97%   BMI 31.97 kg/m²   General appearance: alert and cooperative with exam, in no acute distress  HEENT: normocephalic, atraumatic,   Neck: supple, trach tube present  Lungs: clear to auscultation bilaterally, breathing comfortably on mv  Heart:: regular rate and rhythm,   Abdomen: peg tube  Extremities: extremities atraumatic, no cyanosis or edema  Neurologic: alert, oriented, follows commands, interactive    MEDICAL DECISION MAKING             Patient Active Problem List     Diagnosis Date Noted    Moderate malnutrition (HCC) 10/27/2024    Acute on chronic respiratory failure 10/26/2024      -Renal Function Monitoring: stable     -Dialysis Status: MWF hemodialysis as outpt//HD planned monday     -Blood Pressure Management : low normal range      -Volume Status: euvolemic //UF not doable due to low BP     -Electrolytes: monitor     -Acid/Base: normal     -Mineral/Bone Disease Management : calcium is high so no calcitriol nor calcium tabs needed//obtain TSH for hypercalcemia workup. I suspect this is hypercalcemia of immobilization     -Anemia Management; on retacrit with dialysis      -Reason for admission; acute hypoxic resp failure     --Since HD cath insertion site appears infected, the right tunnelled HD cath was removed   --Once he is ready for dc, a new tunnelled HD cath will be needed, on the left side  --If he is going to need long term abx then have IR place a tunnelled central line too, once the new HD cath in being placed                              Electronically signed by Mike Hoskins DO on 11/3/2024 at 6:36 AM    [facility]  MD KAYLEE ARRINGTON DO  [address] [facility],  [address] [place] [address]  PHONE: [phone]  FAX: [phone]

## 2024-11-03 NOTE — PLAN OF CARE
Problem: Safety - Adult  Goal: Free from fall injury  Outcome: Progressing     Problem: Discharge Planning  Goal: Discharge to home or other facility with appropriate resources  Outcome: Progressing  Flowsheets (Taken 11/3/2024 0845 by Rosemarie Maldonado, RN)  Discharge to home or other facility with appropriate resources:   Identify barriers to discharge with patient and caregiver   Arrange for needed discharge resources and transportation as appropriate   Identify discharge learning needs (meds, wound care, etc)     Problem: Skin/Tissue Integrity  Goal: Absence of new skin breakdown  Description: 1.  Monitor for areas of redness and/or skin breakdown  2.  Assess vascular access sites hourly  3.  Every 4-6 hours minimum:  Change oxygen saturation probe site  4.  Every 4-6 hours:  If on nasal continuous positive airway pressure, respiratory therapy assess nares and determine need for appliance change or resting period.  Outcome: Progressing     Problem: Pain  Goal: Verbalizes/displays adequate comfort level or baseline comfort level  Outcome: Progressing     Problem: Nutrition Deficit:  Goal: Optimize nutritional status  Outcome: Progressing     Problem: Neurosensory - Adult  Goal: Achieves maximal functionality and self care  Outcome: Progressing  Flowsheets (Taken 11/3/2024 0845 by Rosemarie Maldonado, RN)  Achieves maximal functionality and self care:   Monitor swallowing and airway patency with patient fatigue and changes in neurological status   Encourage and assist patient to increase activity and self care with guidance from physical therapy/occupational therapy     Problem: Respiratory - Adult  Goal: Achieves optimal ventilation and oxygenation  Outcome: Progressing  Flowsheets (Taken 11/3/2024 0845 by Rosemarie Maldonado, RN)  Achieves optimal ventilation and oxygenation:   Assess for changes in respiratory status   Assess for changes in mentation and behavior   Oxygen supplementation based on oxygen saturation or

## 2024-11-04 ENCOUNTER — APPOINTMENT (OUTPATIENT)
Dept: INTERVENTIONAL RADIOLOGY/VASCULAR | Age: 64
End: 2024-11-04
Payer: MEDICARE

## 2024-11-04 ENCOUNTER — APPOINTMENT (OUTPATIENT)
Dept: GENERAL RADIOLOGY | Age: 64
End: 2024-11-04
Payer: MEDICARE

## 2024-11-04 LAB
ARTERIAL PATENCY WRIST A: ABNORMAL
BODY TEMPERATURE: 37
COHGB MFR BLD: 0.6 % (ref 0.5–1.5)
ERYTHROCYTE [DISTWIDTH] IN BLOOD BY AUTOMATED COUNT: 17.5 % (ref 11.7–14.9)
GLUCOSE BLD-MCNC: 65 MG/DL (ref 74–99)
GLUCOSE BLD-MCNC: 72 MG/DL (ref 74–99)
GLUCOSE BLD-MCNC: 77 MG/DL (ref 74–99)
HCO3 VENOUS: 28.7 MMOL/L (ref 22–29)
HCT VFR BLD AUTO: 27 % (ref 42–52)
HGB BLD-MCNC: 8.1 G/DL (ref 13.5–18)
MCH RBC QN AUTO: 28.3 PG (ref 27–31)
MCHC RBC AUTO-ENTMCNC: 30 G/DL (ref 32–36)
MCV RBC AUTO: 94.4 FL (ref 78–100)
METHEMOGLOBIN: 0.5 % (ref 0.5–1.5)
OXYHGB MFR BLD: 72.2 %
PCO2 VENOUS: 46.4 MM HG (ref 38–54)
PH VENOUS: 7.41 (ref 7.32–7.43)
PLATELET # BLD AUTO: 142 K/UL (ref 140–440)
PMV BLD AUTO: 8.9 FL (ref 7.5–11.1)
PO2 VENOUS: 39.3 MM HG (ref 23–48)
POSITIVE BASE EXCESS, VEN: 3.5 MMOL/L (ref 0–3)
RBC # BLD AUTO: 2.86 M/UL (ref 4.6–6.2)
WBC OTHER # BLD: 4 K/UL (ref 4–10.5)

## 2024-11-04 PROCEDURE — 87205 SMEAR GRAM STAIN: CPT

## 2024-11-04 PROCEDURE — 87070 CULTURE OTHR SPECIMN AEROBIC: CPT

## 2024-11-04 PROCEDURE — 6370000000 HC RX 637 (ALT 250 FOR IP): Performed by: STUDENT IN AN ORGANIZED HEALTH CARE EDUCATION/TRAINING PROGRAM

## 2024-11-04 PROCEDURE — 2060000000 HC ICU INTERMEDIATE R&B

## 2024-11-04 PROCEDURE — 89220 SPUTUM SPECIMEN COLLECTION: CPT

## 2024-11-04 PROCEDURE — 2580000003 HC RX 258: Performed by: NURSE PRACTITIONER

## 2024-11-04 PROCEDURE — 6370000000 HC RX 637 (ALT 250 FOR IP): Performed by: INTERNAL MEDICINE

## 2024-11-04 PROCEDURE — 94640 AIRWAY INHALATION TREATMENT: CPT

## 2024-11-04 PROCEDURE — 02HV33Z INSERTION OF INFUSION DEVICE INTO SUPERIOR VENA CAVA, PERCUTANEOUS APPROACH: ICD-10-PCS | Performed by: INTERNAL MEDICINE

## 2024-11-04 PROCEDURE — 87077 CULTURE AEROBIC IDENTIFY: CPT

## 2024-11-04 PROCEDURE — 94761 N-INVAS EAR/PLS OXIMETRY MLT: CPT

## 2024-11-04 PROCEDURE — 82805 BLOOD GASES W/O2 SATURATION: CPT

## 2024-11-04 PROCEDURE — 87186 SC STD MICRODIL/AGAR DIL: CPT

## 2024-11-04 PROCEDURE — 6360000002 HC RX W HCPCS: Performed by: INTERNAL MEDICINE

## 2024-11-04 PROCEDURE — 05PYX3Z REMOVAL OF INFUSION DEVICE FROM UPPER VEIN, EXTERNAL APPROACH: ICD-10-PCS | Performed by: STUDENT IN AN ORGANIZED HEALTH CARE EDUCATION/TRAINING PROGRAM

## 2024-11-04 PROCEDURE — 6360000002 HC RX W HCPCS: Performed by: NURSE PRACTITIONER

## 2024-11-04 PROCEDURE — 36415 COLL VENOUS BLD VENIPUNCTURE: CPT

## 2024-11-04 PROCEDURE — 85027 COMPLETE CBC AUTOMATED: CPT

## 2024-11-04 PROCEDURE — 0JH63XZ INSERTION OF TUNNELED VASCULAR ACCESS DEVICE INTO CHEST SUBCUTANEOUS TISSUE AND FASCIA, PERCUTANEOUS APPROACH: ICD-10-PCS | Performed by: INTERNAL MEDICINE

## 2024-11-04 PROCEDURE — 6360000002 HC RX W HCPCS: Performed by: STUDENT IN AN ORGANIZED HEALTH CARE EDUCATION/TRAINING PROGRAM

## 2024-11-04 PROCEDURE — 2700000000 HC OXYGEN THERAPY PER DAY

## 2024-11-04 PROCEDURE — 2580000003 HC RX 258: Performed by: INTERNAL MEDICINE

## 2024-11-04 PROCEDURE — 82962 GLUCOSE BLOOD TEST: CPT

## 2024-11-04 PROCEDURE — 71045 X-RAY EXAM CHEST 1 VIEW: CPT

## 2024-11-04 PROCEDURE — 94003 VENT MGMT INPAT SUBQ DAY: CPT

## 2024-11-04 RX ORDER — SODIUM CHLORIDE FOR INHALATION 3 %
4 VIAL, NEBULIZER (ML) INHALATION 2 TIMES DAILY
Status: DISCONTINUED | OUTPATIENT
Start: 2024-11-04 | End: 2024-11-08 | Stop reason: HOSPADM

## 2024-11-04 RX ADMIN — EPOETIN ALFA-EPBX 10000 UNITS: 10000 INJECTION, SOLUTION INTRAVENOUS; SUBCUTANEOUS at 14:26

## 2024-11-04 RX ADMIN — HEPARIN SODIUM 5000 UNITS: 5000 INJECTION INTRAVENOUS; SUBCUTANEOUS at 05:31

## 2024-11-04 RX ADMIN — OXYCODONE HYDROCHLORIDE 5 MG: 5 TABLET ORAL at 12:37

## 2024-11-04 RX ADMIN — TRAZODONE HYDROCHLORIDE 50 MG: 50 TABLET ORAL at 21:29

## 2024-11-04 RX ADMIN — ATORVASTATIN CALCIUM 40 MG: 40 TABLET, FILM COATED ORAL at 21:29

## 2024-11-04 RX ADMIN — ALBUTEROL SULFATE 2.5 MG: 2.5 SOLUTION RESPIRATORY (INHALATION) at 11:16

## 2024-11-04 RX ADMIN — Medication 4 ML: at 15:12

## 2024-11-04 RX ADMIN — OXYCODONE HYDROCHLORIDE 5 MG: 5 TABLET ORAL at 05:25

## 2024-11-04 RX ADMIN — Medication 4 ML: at 20:27

## 2024-11-04 RX ADMIN — ALBUTEROL SULFATE 2.5 MG: 2.5 SOLUTION RESPIRATORY (INHALATION) at 08:24

## 2024-11-04 RX ADMIN — ALPRAZOLAM 0.5 MG: 0.5 TABLET ORAL at 21:29

## 2024-11-04 RX ADMIN — OXYCODONE HYDROCHLORIDE 5 MG: 5 TABLET ORAL at 18:37

## 2024-11-04 RX ADMIN — MIDODRINE HYDROCHLORIDE 10 MG: 5 TABLET ORAL at 05:26

## 2024-11-04 RX ADMIN — MICONAZOLE NITRATE: 2 POWDER TOPICAL at 08:42

## 2024-11-04 RX ADMIN — IPRATROPIUM BROMIDE 0.5 MG: 0.5 SOLUTION RESPIRATORY (INHALATION) at 11:17

## 2024-11-04 RX ADMIN — ALPRAZOLAM 0.5 MG: 0.5 TABLET ORAL at 15:20

## 2024-11-04 RX ADMIN — ALPRAZOLAM 0.5 MG: 0.5 TABLET ORAL at 00:07

## 2024-11-04 RX ADMIN — PIPERACILLIN AND TAZOBACTAM 3375 MG: 3; .375 INJECTION, POWDER, LYOPHILIZED, FOR SOLUTION INTRAVENOUS at 21:33

## 2024-11-04 RX ADMIN — LANSOPRAZOLE 30 MG: 30 TABLET, ORALLY DISINTEGRATING ORAL at 05:25

## 2024-11-04 RX ADMIN — GABAPENTIN 100 MG: 100 CAPSULE ORAL at 08:39

## 2024-11-04 RX ADMIN — IPRATROPIUM BROMIDE 0.5 MG: 0.5 SOLUTION RESPIRATORY (INHALATION) at 15:12

## 2024-11-04 RX ADMIN — MIDODRINE HYDROCHLORIDE 10 MG: 5 TABLET ORAL at 12:37

## 2024-11-04 RX ADMIN — ALBUTEROL SULFATE 2.5 MG: 2.5 SOLUTION RESPIRATORY (INHALATION) at 20:25

## 2024-11-04 RX ADMIN — IPRATROPIUM BROMIDE 0.5 MG: 0.5 SOLUTION RESPIRATORY (INHALATION) at 20:26

## 2024-11-04 RX ADMIN — LORAZEPAM 1 MG: 1 TABLET ORAL at 11:10

## 2024-11-04 RX ADMIN — PIPERACILLIN AND TAZOBACTAM 3375 MG: 3; .375 INJECTION, POWDER, LYOPHILIZED, FOR SOLUTION INTRAVENOUS at 05:13

## 2024-11-04 RX ADMIN — LORAZEPAM 1 MG: 1 TABLET ORAL at 22:31

## 2024-11-04 RX ADMIN — MICONAZOLE NITRATE: 2 POWDER TOPICAL at 21:55

## 2024-11-04 RX ADMIN — ALBUTEROL SULFATE 2.5 MG: 2.5 SOLUTION RESPIRATORY (INHALATION) at 03:34

## 2024-11-04 RX ADMIN — LOPERAMIDE HYDROCHLORIDE 2 MG: 2 CAPSULE ORAL at 11:10

## 2024-11-04 RX ADMIN — LORAZEPAM 1 MG: 1 TABLET ORAL at 17:58

## 2024-11-04 RX ADMIN — PIPERACILLIN AND TAZOBACTAM 3375 MG: 3; .375 INJECTION, POWDER, LYOPHILIZED, FOR SOLUTION INTRAVENOUS at 12:56

## 2024-11-04 RX ADMIN — ALBUTEROL SULFATE 2.5 MG: 2.5 SOLUTION RESPIRATORY (INHALATION) at 15:12

## 2024-11-04 ASSESSMENT — PULMONARY FUNCTION TESTS
PIF_VALUE: 22
PIF_VALUE: 21
PIF_VALUE: 22
PIF_VALUE: 20
PIF_VALUE: 19
PIF_VALUE: 18
PIF_VALUE: 21
PIF_VALUE: 20
PIF_VALUE: 21
PIF_VALUE: 19
PIF_VALUE: 19
PIF_VALUE: 21

## 2024-11-04 ASSESSMENT — PAIN SCALES - GENERAL
PAINLEVEL_OUTOF10: 4
PAINLEVEL_OUTOF10: 0
PAINLEVEL_OUTOF10: 2
PAINLEVEL_OUTOF10: 2
PAINLEVEL_OUTOF10: 10

## 2024-11-04 ASSESSMENT — PAIN DESCRIPTION - LOCATION
LOCATION: NECK
LOCATION: GENERALIZED
LOCATION: GENERALIZED

## 2024-11-04 ASSESSMENT — PAIN - FUNCTIONAL ASSESSMENT: PAIN_FUNCTIONAL_ASSESSMENT: ACTIVITIES ARE NOT PREVENTED

## 2024-11-04 ASSESSMENT — PAIN DESCRIPTION - DESCRIPTORS
DESCRIPTORS: ACHING;BURNING
DESCRIPTORS: ACHING

## 2024-11-04 ASSESSMENT — PAIN DESCRIPTION - ORIENTATION: ORIENTATION: LEFT

## 2024-11-04 ASSESSMENT — PAIN DESCRIPTION - PAIN TYPE: TYPE: CHRONIC PAIN

## 2024-11-04 NOTE — PROGRESS NOTES
Infectious Disease Progress Note  2024   Patient Name: Tico Day : 1960   Impression  Pseudomonas aeruginosa Pneumonia:  Acute on Chronic Hypoxic Respiratory Failure Requiring Mechanical Ventilator to Chronic Trach:  Vascath Associated Line Infection S/p Removal 10/31/2024:    Continue IV Zosyn as difficult to say if ProBNP elevation in ESRD pt is causing the acute on chronic hypoxic resp failure or is it pneumonia but will treat for pneumonia. Infection from  right chest vascath s/p removal on 10/31 growing MDR Proteus mirabilis and Staphylococcus epidermidis   No reported allergies to ABX. CrCl 30 on ESRD. T-max 99.6. No leukocytosis. Pro-BNP 43,703. Viral resp panel and MRSA by PCR negative. CRP on dwt.   10/25-BC 0/2 NGTD  10/31-CVC removed, Culture: MRD Proteus mirabilis (sensi to cefepime, cefuroxime, ertapenem, meropenem, gentamicin and Zosyn) and Staphylococcus epidermidis (no sensi obtained).   10/26-Resp culture: Pseudomonas aeruginosa (pan sensi)   ESRD on HD (MWF):  Dr. Morales onboard  Decompensated HFrEF:  Multi-morbidity: per PMHx: ESRD on HD thrice weekly MWF, chronic HFrEF, chronic respiratory failure with tracheostomy since 2024, left atrial thrombus, wide-complex tachycardia, CAD s/p PCI , COPD, anemia of chronic disease, ALONDRA, anxiety and chronic pain with opioid dependence  Plan:  Continue IV Zosyn 3,375 mg tid, plan for a cumulative 14 day course (end date 2024) as will cover an 8 day course of therapy from removal of vascath as well as pneumonia.   Trend CRP and Pct, ordered  Reviewed Vascath cultured tip growing P.mirabilis and S.epidermidis, Zosyn should cover both microbes   Following as showing improvement as remains on baseline oxygen, vascath has been removed, and is hemodynamically stable    Ongoing Antimicrobial Therapy  Zosyn 10/28- ?  Completed Antimicrobial Therapy  Meropenem 10/26-28  Doxycycline 10/25  Cefepime 10/25  Vancomycin

## 2024-11-04 NOTE — PROGRESS NOTES
V2.0  [facility] [place]      Name:  Tico Day /Age/Sex: 1960  (64 y.o. male)   MRN & CSN:  [id number] & [id number] Encounter Date/Time: 2024 10:55 AM EDT    Location:  -A PCP: Carlos Nina MD       Hospital Day: 11    Assessment and Plan:   Tico Day is a 64 y.o. male with pmh of  ESRD on HD , chronic systolic heart failure, chronic respiratory failure with tracheostomy since , left atrial thrombus, wide-complex tachycardia, CAD with history of PCI in , COPD, anemia of chronic disease, obstructive sleep apnea, anxiety, chronic pain with opioid dependence  who presents with Acute on chronic respiratory failure      Plan:  Tracheostomy dependence  Acute on chronic respiratory failure with hypoxia requiring ventilatory support  Secondary to acute on chronic systolic heart failure, possible pneumonia  Chest x-ray personally reviewed bilateral pulmonary infiltrates R>L and cardiomegaly Pro-BNP 43,703.  Last echocardiogram from 2024 reviewed EF of 40%.  Negative respiratory viral panel  monitor intake and output measure daily weight  Nephrology consultation for inpatient management of HD and volume management  Sputum culture growing Pseudomonas aeruginosa sensitive to Zosyn switched from meropenem to Zosyn; ID team consulted - plan for 2 weeks total coverage, continue zosyn - end date 2024  Not tolerating pressure support ventilation  Consulted pulmonology for vent management     Infected TDC catheter, proteus mirabilis  Painful, tender, erythematous central line   ID team onboard  IR team removed TDC and tip sent for culture - growing Proteus and Staph epi  Blood cultures have been negative  Currently had temp HD cath  TDC placement planned for tomorrow  Await final antibiotic recommendation from ID    ESRD on HD   Nephrology consultation for inpatient management of hemodialysis     Elevated troponin  Last 24 Hours   CT ABDOMEN PELVIS WO CONTRAST Additional Contrast? None    Result Date: 10/26/2024  INDICATION: Right-sided abdominal tenderness COMPARISON: None available TECHNIQUE: CT abdomen/pelvis obtained without IV contrast per departmental protocol. FINDINGS: Extremely limited evaluation due to extensive artifact from poor arm positioning and right upper quadrant clips. Small bilateral pleural effusions with adjacent atelectasis/consolidations. Bibasilar groundglass opacities. Right lung base subcentimeter nodular opacities, incompletely visualized. Heavy coronary artery calcifications. Cardiomegaly. Cirrhotic appearing liver. No hepatic lesions. Moderate ascites. No calcified gallstones. No pancreatic lesions. Splenomegaly. Splenic granulomata. No significant adrenal nodules. No hydronephrosis or obstructing renal stones. Nonobstructing left renal lower pole stones measuring 5 mm. Colonic diverticulosis. No evidence of bowel obstruction or inflammation. Heavy atherosclerotic vascular disease. No free air. No lymphadenopathy. Intact osseous structures. Umbilical fat-containing hernia with a sac measuring 6.2 cm in diameter and a neck measuring 1.8 cm in diameter.     Extremely limited evaluation due to extensive artifact from poor arm positioning and right upper quadrant clips. If suspicion for gallbladder pathology, right upper quadrant ultrasound is recommended. Hepatic cirrhosis with sequelae including moderate ascites and splenomegaly. Diverticulosis. Small bilateral pleural effusions with adjacent consolidation/atelectasis. Right lung base subcentimeter nodular opacities, incompletely visualized. Consider short-term follow-up dedicated CT of the chest. Cardiomegaly. Umbilical fat-containing hernia. Electronically signed by FAVIAN LY CHEST PORTABLE    Result Date: 10/25/2024  INDICATION: cough; r/o pna COMPARISON: None PROCEDURE: Portable chest xray FINDINGS: Cardiac silhouette upper limits

## 2024-11-04 NOTE — CONSULTS
Central line rounding completed. Sterile dressing change completed per protocol. Patient continues to meet the following criteria for central vascular access: Hemodialysis. DRSG with old and new drainage noted. Primary RN stated that DRSG changed at 2300; SecurePORT IV and gauze applied to insertion site to promote hemostasis. Due to presence of gauze, DRSG will need to be CHANGED 11/7/24.     Consult the Vascular Access Team for questions, concerns, or change in patient's condition.

## 2024-11-04 NOTE — PROGRESS NOTES
Received an IR consult, Dr Pierre approves but states this can be completed tomorrow as the pt received heparin this am.  Pt will be npo except meds tonight at 0000 for this procedure tomorrow.  Pt nurse  Hero Rachel and Dr Hoskins  and Dr Harmon has been updated.

## 2024-11-04 NOTE — PROGRESS NOTES
Nephrology Progress Note        [address] [facility], [address] [place] [address]  Phone: [phone]  Office Hours: 8:30AM - 4:30PM  Monday - Friday 11/4/2024 6:56 AM  Subjective:   Admit Date: 10/25/2024  PCP: Carlos Nina MD  Interval History:   Resting  On vent      Diet: ADULT TUBE FEEDING; PEG; Renal Formula; Continuous; 20; Yes; 20; Q 4 hours; 40; 30; Q 4 hours  ADULT DIET; Regular; Low Potassium (Less than 3000 mg/day); 1500 ml      Data:   Scheduled Meds:   piperacillin-tazobactam  3,375 mg IntraVENous Q8H    epoetin gabriel-epbx  10,000 Units IntraVENous Once per day on Monday Wednesday Friday    miconazole   Topical BID    ALPRAZolam  0.5 mg PEG Tube Once    atorvastatin  40 mg Oral Nightly    gabapentin  100 mg Per G Tube Daily    lansoprazole  30 mg Oral QAM AC    traZODone  50 mg Oral Nightly    midodrine  10 mg Oral TID WC    heparin (porcine)  5,000 Units SubCUTAneous 3 times per day    albuterol  2.5 mg Nebulization Q4H     Continuous Infusions:   propofol Stopped (11/01/24 1545)    sodium chloride      dextrose       PRN Meds:loperamide, LORazepam **OR** LORazepam, sodium chloride, glucose, dextrose bolus **OR** dextrose bolus, glucagon (rDNA), dextrose, ipratropium 0.5 mg-albuterol 2.5 mg, ALPRAZolam, oxyCODONE, sodium chloride (Inhalant)  I/O last 3 completed shifts:  In: 2229 [NG/GT:1729]  Out: 515 [Urine:260]  I/O this shift:  In: 312.1 [IV Piggyback:312.1]  Out: 250 [Urine:250]    Intake/Output Summary (Last 24 hours) at 11/4/2024 0656  Last data filed at 11/4/2024 0621  Gross per 24 hour   Intake 863.1 ml   Output 360 ml   Net 503.1 ml       CBC:   Recent Labs     11/02/24 0319   HGB 7.8*       BMP:    Recent Labs     11/02/24 0319 11/03/24  0441   * 134*   K 3.7 3.8   CL 97* 100   CO2 29 28   BUN 27* 17   CREATININE 3.4* 2.5*   GLUCOSE 64* 91   CALCIUM 12.1* 11.8*         Objective:   Vitals: /64   Pulse 69   Temp 98.3 °F (36.8 °C) (Oral)   Resp 17   Ht

## 2024-11-04 NOTE — PROGRESS NOTES
Tolerated 3 hour dialysis treatment well, removed no fluid with treatment.  Left non-tunneled catheter used for access and lines flushed and alcohol caps applied post treatment.  EPO given as ordered.      Patient Name: Tico Day  Patient : 1960  MRN: [id number]     Acct: [id number]  Date of Admission: 10/25/2024  Room/Bed: [address] [place] [address]  Code Status:  Full Code  Allergies:   Allergies   Allergen Reactions    Reglan [Metoclopramide]     Remeron [Mirtazapine]      Diagnosis:    Patient Active Problem List   Diagnosis    Acute on chronic respiratory failure    Moderate malnutrition (HCC)    Pneumonia of both lower lobes due to Pseudomonas species (HCC)    Central line infection    Acute on chronic hypoxic respiratory failure         Treatment:  Hemodialysis 1:1  Priority: Routine  Location: ICU    Diabetic: No  NPO: No  Isolation Precautions: Contact     Consent for Treatment Verified: Yes  Blood Consent Verified: Not Applicable     Safety Verified: Identify (I), Consent (C), Equipment (E), HepB Status (B), Orders Complete (O), Access Verified (A), and Timeliness (T)  Time out performed prior to access at 1140 hours.    Report Received from Primary RN at 1130 hours.  Primary RN (First Initial, Last Name, Title): SOHAN Sinclair RN  Incapacitated Nurse Education Completed: Yes     HBsAg ONLY:  Date Drawn: 2024       Results: Negative  HBsAb:  Date Drawn:  2024       Results: Susceptible <10    Order  Dialysis Bath  K+ (Potassium): 4  Ca+ (Calcium): 2.5  Na+ (Sodium): 138  HCO3 (Bicarb): 35  Bicarbonate Concentrate Lot No.: [id number]  Acid Concentrate Lot No.: [id number]     Na+ Modeling: Not Applicable  Dialyzer: F180  Dialysate Temperature (C):  36  Blood Flow Rate (BFR):  350   Dialysate Flow Rate (DFR):   700        Access to be Utilized   Access: Non-tunneled Catheter  Location: Internal Jugular  Side: Left   Needle gauge:  Not Applicable  + Bruit/Thrill: Not Applicable    First Use  to give pain meds Yes   11/06/24 0945 350 ml/min 170 ml/hr 214 ml -110 mmHg 110 20 700 No complaints voiced Yes   11/06/24 1000 350 ml/min 170 ml/hr 256 ml -120 mmHg 110 20 700 Resting quietly Yes   11/06/24 1015 350 ml/min 170 ml/hr 299 ml -110 mmHg 110 20 700 Lines secure, visible Yes   11/06/24 1030 350 ml/min 170 ml/hr 337 ml -110 mmHg 110 30 700 Bicarb changed Yes   11/06/24 1045 350 ml/min 170 ml/hr 375 ml -110 mmHg 110 30 700 BP and MAP acceptable, will monitor Yes   11/06/24 1100 350 ml/min 170 ml/hr 414 ml -110 mmHg 110 30 700 No complaints voiced Yes   11/06/24 1115 350 ml/min 170 ml/hr 456 ml -110 mmHg 110 20 700 RN suctioning pt Yes   11/06/24 1130 350 ml/min 0 ml/hr 500 ml -30 mmHg 50 40 700 Treatment completed Yes     Vital Signs  Patient Vitals for the past 8 hrs:   BP Temp Pulse Resp SpO2   11/06/24 0630 -- -- 70 25 --   11/06/24 0800 (!) 94/56 -- 62 18 98 %   11/06/24 0817 (!) 100/56 97.7 °F (36.5 °C) 63 18 98 %   11/06/24 0828 (!) 97/58 -- 62 18 97 %   11/06/24 0845 (!) 91/58 -- 59 18 98 %   11/06/24 0900 (!) 98/57 -- 60 18 98 %   11/06/24 0904 -- -- 63 19 100 %   11/06/24 0905 -- -- 67 21 97 %   11/06/24 0906 -- -- 64 19 100 %   11/06/24 0915 100/60 -- 65 20 100 %   11/06/24 0929 -- -- -- 18 --   11/06/24 0930 106/60 -- 67 22 100 %   11/06/24 0945 105/62 -- 69 25 100 %   11/06/24 0959 -- -- -- 18 --   11/06/24 1000 (!) 97/54 -- 64 19 90 %   11/06/24 1015 (!) 88/54 -- 64 19 98 %   11/06/24 1030 (!) 88/53 -- 65 19 96 %   11/06/24 1045 (!) 86/50 -- 64 19 91 %   11/06/24 1100 (!) 84/52 -- 64 19 --   11/06/24 1115 95/68 -- 70 25 98 %   11/06/24 1130 107/60 -- 71 23 98 %   11/06/24 1145 112/60 98.1 °F (36.7 °C) 64 21 100 %   11/06/24 1200 100/60 -- 63 18 100 %   11/06/24 1213 -- -- 63 19 100 %   11/06/24 1214 -- -- 63 18 100 %   11/06/24 1300 112/60 -- 67 24 100 %     Post-Dialysis  Arterial Catheter Locking Solution:  NS  Venous Catheter Locking Solution:  NS  Post-Treatment Procedures: Blood returned,

## 2024-11-04 NOTE — PLAN OF CARE
Problem: Safety - Adult  Goal: Free from fall injury  11/3/2024 2326 by Cuco Hopkins RN  Outcome: Progressing  11/3/2024 1746 by Rebeca Fink RN  Outcome: Progressing     Problem: Discharge Planning  Goal: Discharge to home or other facility with appropriate resources  11/3/2024 2326 by Cuco Hopkins RN  Outcome: Progressing  11/3/2024 1746 by Rebeca Fink RN  Outcome: Progressing  Flowsheets (Taken 11/3/2024 0845 by Rosemarie Maldonado, RN)  Discharge to home or other facility with appropriate resources:   Identify barriers to discharge with patient and caregiver   Arrange for needed discharge resources and transportation as appropriate   Identify discharge learning needs (meds, wound care, etc)     Problem: Skin/Tissue Integrity  Goal: Absence of new skin breakdown  Description: 1.  Monitor for areas of redness and/or skin breakdown  2.  Assess vascular access sites hourly  3.  Every 4-6 hours minimum:  Change oxygen saturation probe site  4.  Every 4-6 hours:  If on nasal continuous positive airway pressure, respiratory therapy assess nares and determine need for appliance change or resting period.  11/3/2024 2326 by Cuco Hopkins RN  Outcome: Progressing  11/3/2024 1746 by Rebeca Fink RN  Outcome: Progressing     Problem: Pain  Goal: Verbalizes/displays adequate comfort level or baseline comfort level  11/3/2024 2326 by Cuco Hopkins RN  Outcome: Progressing  11/3/2024 1746 by Rebeca Fink RN  Outcome: Progressing     Problem: Nutrition Deficit:  Goal: Optimize nutritional status  11/3/2024 2326 by Cuco Hopkins, RN  Outcome: Progressing  11/3/2024 1746 by Rebeca Fink RN  Outcome: Progressing     Problem: Neurosensory - Adult  Goal: Achieves maximal functionality and self care  11/3/2024 2326 by Cuco Hopkins RN  Outcome: Progressing  11/3/2024 1746 by Rebeca Fink RN  Outcome: Progressing  Flowsheets (Taken 11/3/2024 0845 by Rosemarie Maldonado, RN)  Achieves maximal  activity tolerance for standing, transferring and ambulating with or without assistive devices   Assist with transfers and ambulation using safe patient handling equipment as needed     Problem: Gastrointestinal - Adult  Goal: Maintains or returns to baseline bowel function  11/3/2024 2326 by Cuco Hopkins RN  Outcome: Progressing  11/3/2024 1746 by Rebeca Fink RN  Outcome: Progressing  Flowsheets (Taken 11/3/2024 0845 by Rosemarie Maldonado, RN)  Maintains or returns to baseline bowel function:   Assess bowel function   Administer IV fluids as ordered to ensure adequate hydration  Goal: Maintains adequate nutritional intake  11/3/2024 2326 by Cuco Hopkins RN  Outcome: Progressing  11/3/2024 1746 by Rebeca Fink RN  Outcome: Progressing  Flowsheets (Taken 11/3/2024 0845 by Rosemarie Maldonado RN)  Maintains adequate nutritional intake:   Monitor percentage of each meal consumed   Identify factors contributing to decreased intake, treat as appropriate     Problem: Genitourinary - Adult  Goal: Absence of urinary retention  11/3/2024 2326 by Cuco Hopkins RN  Outcome: Progressing  11/3/2024 1746 by Rebeca Fink RN  Outcome: Progressing  Flowsheets (Taken 11/3/2024 0845 by Rosemarie Maldonado, RN)  Absence of urinary retention: Monitor intake/output and perform bladder scan as needed     Problem: Infection - Adult  Goal: Absence of infection at discharge  11/3/2024 2326 by Cuco Hopkins RN  Outcome: Progressing  11/3/2024 1746 by Rebeca Fink RN  Outcome: Progressing  Flowsheets (Taken 11/3/2024 0845 by Rosemarie Maldonado, RN)  Absence of infection at discharge:   Assess and monitor for signs and symptoms of infection   Monitor lab/diagnostic results   Monitor all insertion sites i.e., indwelling lines, tubes and drains  Goal: Absence of infection during hospitalization  11/3/2024 2326 by Cuco Hopkins RN  Outcome: Progressing  11/3/2024 1746 by Rebeca Fink RN  Outcome: Progressing  Flowsheets (Taken

## 2024-11-04 NOTE — CONSULTS
[facility] [place] [id number]                              CONSULTATION      PATIENT NAME: ANAMARIA OROURKE            : 1960  MED REC NO: [id number]                      ROOM: 2122  ACCOUNT NO: [id number]                       ADMIT DATE: 10/25/2024  PROVIDER: Bashir Bragg MD      CONSULT DATE: 2024    HISTORY OF PRESENT ILLNESS:  The patient is a 64-year-old gentleman with multiple medical problems including end-stage renal disease, on dialysis; chronic systolic heart failure; chronic respiratory failure with tracheostomy; coronary artery disease; and obstructive sleep apnea; who was brought to the emergency room from the nursing home because of increasing shortness of breath and significant secretions from the tracheostomy.  The patient was in respiratory distress.  The patient was placed on the ventilator and subsequently was admitted to the hospital.  While in the hospital, the patient has improved.  There is no history of hemoptysis, hematemesis, nausea, or vomiting.  No history of chest pains.  The patient is lying comfortably in bed.    PAST MEDICAL HISTORY:  Significant for chronic renal failure, chronic congestive heart failure, chronic respiratory failure with tracheostomy, left atrial thrombus, coronary artery disease, and obstructive sleep apnea.    PAST SURGICAL HISTORY:  Remarkable for tracheostomy.    FAMILY HISTORY:  Not available.    SOCIAL HISTORY:  Reveals that he used to smoke half pack per day and smoked for 32 years.  No history of alcohol abuse.    MEDICATIONS:  Reviewed.    ALLERGIES:  HE IS ALLERGIC TO REGLAN AND REMERON.      REVIEW OF SYSTEMS:  Unobtainable at this time as the patient is on the ventilator.    PHYSICAL EXAMINATION:  GENERAL:  The patient is lying comfortably in bed, in no acute respiratory distress.  VITAL SIGNS:  His blood pressure is 118/64 mmHg, pulse of 69 per

## 2024-11-04 NOTE — PLAN OF CARE
Problem: Safety - Adult  Goal: Free from fall injury  Outcome: Progressing     Problem: Discharge Planning  Goal: Discharge to home or other facility with appropriate resources  Outcome: Progressing     Problem: Skin/Tissue Integrity  Goal: Absence of new skin breakdown  Description: 1.  Monitor for areas of redness and/or skin breakdown  2.  Assess vascular access sites hourly  3.  Every 4-6 hours minimum:  Change oxygen saturation probe site  4.  Every 4-6 hours:  If on nasal continuous positive airway pressure, respiratory therapy assess nares and determine need for appliance change or resting period.  Outcome: Progressing     Problem: Pain  Goal: Verbalizes/displays adequate comfort level or baseline comfort level  Outcome: Progressing     Problem: Nutrition Deficit:  Goal: Optimize nutritional status  Outcome: Progressing     Problem: Neurosensory - Adult  Goal: Achieves maximal functionality and self care  Outcome: Progressing     Problem: Respiratory - Adult  Goal: Achieves optimal ventilation and oxygenation  Outcome: Progressing     Problem: Cardiovascular - Adult  Goal: Maintains optimal cardiac output and hemodynamic stability  Outcome: Progressing     Problem: Skin/Tissue Integrity - Adult  Goal: Skin integrity remains intact  Outcome: Progressing     Problem: Musculoskeletal - Adult  Goal: Return mobility to safest level of function  Outcome: Progressing     Problem: Gastrointestinal - Adult  Goal: Maintains or returns to baseline bowel function  Outcome: Progressing  Goal: Maintains adequate nutritional intake  Outcome: Progressing     Problem: Genitourinary - Adult  Goal: Absence of urinary retention  Outcome: Progressing     Problem: Infection - Adult  Goal: Absence of infection at discharge  Outcome: Progressing  Goal: Absence of infection during hospitalization  Outcome: Progressing     Problem: Metabolic/Fluid and Electrolytes - Adult  Goal: Electrolytes maintained within normal limits  Outcome:  Progressing  Goal: Hemodynamic stability and optimal renal function maintained  Outcome: Progressing  Goal: Glucose maintained within prescribed range  Outcome: Progressing     Problem: Hematologic - Adult  Goal: Maintains hematologic stability  Outcome: Progressing

## 2024-11-04 NOTE — CARE COORDINATION
Chart reviewed. Attempted to call [facility] Vent. No answer. Left VM informing her of pt's concern that he was not receiving his anxiety/depression medication while at [facility] and interest in transferring once he returns.     Requested call back. Anticipated return to [facility] at discharge.

## 2024-11-05 LAB
ALBUMIN SERPL-MCNC: 2.1 G/DL (ref 3.4–5)
ALBUMIN/GLOB SERPL: 0.5 {RATIO} (ref 1.1–2.2)
ALP SERPL-CCNC: 210 U/L (ref 40–129)
ALT SERPL-CCNC: 21 U/L (ref 10–40)
ANION GAP SERPL CALCULATED.3IONS-SCNC: 5 MMOL/L (ref 9–17)
ARTERIAL PATENCY WRIST A: ABNORMAL
ARTERIAL PATENCY WRIST A: ABNORMAL
AST SERPL-CCNC: 56 U/L (ref 15–37)
BASOPHILS # BLD: 0.04 K/UL
BASOPHILS NFR BLD: 1 % (ref 0–1)
BILIRUB SERPL-MCNC: 0.4 MG/DL (ref 0–1)
BNP SERPL-MCNC: ABNORMAL PG/ML (ref 0–125)
BODY TEMPERATURE: 37
BODY TEMPERATURE: 37
BUN SERPL-MCNC: 18 MG/DL (ref 7–20)
CALCIUM SERPL-MCNC: 11.8 MG/DL (ref 8.3–10.6)
CHLORIDE SERPL-SCNC: 98 MMOL/L (ref 99–110)
CO2 SERPL-SCNC: 30 MMOL/L (ref 21–32)
COHGB MFR BLD: 0.3 % (ref 0.5–1.5)
COHGB MFR BLD: 0.6 % (ref 0.5–1.5)
CREAT SERPL-MCNC: 2.7 MG/DL (ref 0.8–1.3)
EOSINOPHIL # BLD: 0.34 K/UL
EOSINOPHILS RELATIVE PERCENT: 9 % (ref 0–3)
ERYTHROCYTE [DISTWIDTH] IN BLOOD BY AUTOMATED COUNT: 17.7 % (ref 11.7–14.9)
GFR, ESTIMATED: 24 ML/MIN/1.73M2
GLUCOSE BLD-MCNC: 60 MG/DL (ref 74–99)
GLUCOSE BLD-MCNC: 68 MG/DL (ref 74–99)
GLUCOSE BLD-MCNC: 71 MG/DL (ref 74–99)
GLUCOSE BLD-MCNC: 71 MG/DL (ref 74–99)
GLUCOSE BLD-MCNC: 75 MG/DL (ref 74–99)
GLUCOSE BLD-MCNC: 75 MG/DL (ref 74–99)
GLUCOSE BLD-MCNC: 76 MG/DL (ref 74–99)
GLUCOSE BLD-MCNC: 87 MG/DL (ref 74–99)
GLUCOSE BLD-MCNC: 88 MG/DL (ref 74–99)
GLUCOSE BLD-MCNC: 91 MG/DL (ref 74–99)
GLUCOSE SERPL-MCNC: 73 MG/DL (ref 74–99)
HCO3 VENOUS: 28.5 MMOL/L (ref 22–29)
HCO3 VENOUS: 29.5 MMOL/L (ref 22–29)
HCT VFR BLD AUTO: 28.1 % (ref 42–52)
HGB BLD-MCNC: 8.4 G/DL (ref 13.5–18)
IMM GRANULOCYTES # BLD AUTO: 0.01 K/UL
IMM GRANULOCYTES NFR BLD: 0 %
LYMPHOCYTES NFR BLD: 0.9 K/UL
LYMPHOCYTES RELATIVE PERCENT: 24 % (ref 24–44)
MAGNESIUM SERPL-MCNC: 1.9 MG/DL (ref 1.8–2.4)
MCH RBC QN AUTO: 28.5 PG (ref 27–31)
MCHC RBC AUTO-ENTMCNC: 29.9 G/DL (ref 32–36)
MCV RBC AUTO: 95.3 FL (ref 78–100)
METHEMOGLOBIN: 0.5 % (ref 0.5–1.5)
METHEMOGLOBIN: 0.6 % (ref 0.5–1.5)
MONOCYTES NFR BLD: 0.42 K/UL
MONOCYTES NFR BLD: 11 % (ref 0–4)
NEUTROPHILS NFR BLD: 54 % (ref 36–66)
NEUTS SEG NFR BLD: 1.98 K/UL
OXYHGB MFR BLD: 86.6 %
OXYHGB MFR BLD: 97.7 %
PCO2 VENOUS: 41.7 MM HG (ref 38–54)
PCO2 VENOUS: 46.7 MM HG (ref 38–54)
PH VENOUS: 7.42 (ref 7.32–7.43)
PH VENOUS: 7.45 (ref 7.32–7.43)
PHOSPHATE SERPL-MCNC: 3.5 MG/DL (ref 2.5–4.9)
PLATELET # BLD AUTO: 121 K/UL (ref 140–440)
PMV BLD AUTO: 8.9 FL (ref 7.5–11.1)
PO2 VENOUS: 131.6 MM HG (ref 23–48)
PO2 VENOUS: 54.3 MM HG (ref 23–48)
POSITIVE BASE EXCESS, VEN: 4.2 MMOL/L (ref 0–3)
POSITIVE BASE EXCESS, VEN: 4.3 MMOL/L (ref 0–3)
POTASSIUM SERPL-SCNC: 4.1 MMOL/L (ref 3.5–5.1)
PROCALCITONIN SERPL-MCNC: 2.68 NG/ML
PROT SERPL-MCNC: 6.5 G/DL (ref 6.4–8.2)
RBC # BLD AUTO: 2.95 M/UL (ref 4.6–6.2)
SODIUM SERPL-SCNC: 134 MMOL/L (ref 136–145)
WBC OTHER # BLD: 3.7 K/UL (ref 4–10.5)

## 2024-11-05 PROCEDURE — 85025 COMPLETE CBC W/AUTO DIFF WBC: CPT

## 2024-11-05 PROCEDURE — 80053 COMPREHEN METABOLIC PANEL: CPT

## 2024-11-05 PROCEDURE — 6360000002 HC RX W HCPCS: Performed by: STUDENT IN AN ORGANIZED HEALTH CARE EDUCATION/TRAINING PROGRAM

## 2024-11-05 PROCEDURE — 94761 N-INVAS EAR/PLS OXIMETRY MLT: CPT

## 2024-11-05 PROCEDURE — 6370000000 HC RX 637 (ALT 250 FOR IP): Performed by: STUDENT IN AN ORGANIZED HEALTH CARE EDUCATION/TRAINING PROGRAM

## 2024-11-05 PROCEDURE — 6370000000 HC RX 637 (ALT 250 FOR IP): Performed by: INTERNAL MEDICINE

## 2024-11-05 PROCEDURE — 2580000003 HC RX 258: Performed by: NURSE PRACTITIONER

## 2024-11-05 PROCEDURE — 2700000000 HC OXYGEN THERAPY PER DAY

## 2024-11-05 PROCEDURE — 89220 SPUTUM SPECIMEN COLLECTION: CPT

## 2024-11-05 PROCEDURE — 2580000003 HC RX 258: Performed by: INTERNAL MEDICINE

## 2024-11-05 PROCEDURE — 94003 VENT MGMT INPAT SUBQ DAY: CPT

## 2024-11-05 PROCEDURE — 6360000002 HC RX W HCPCS: Performed by: INTERNAL MEDICINE

## 2024-11-05 PROCEDURE — 6360000002 HC RX W HCPCS: Performed by: NURSE PRACTITIONER

## 2024-11-05 PROCEDURE — 36415 COLL VENOUS BLD VENIPUNCTURE: CPT

## 2024-11-05 PROCEDURE — 2580000003 HC RX 258: Performed by: FAMILY MEDICINE

## 2024-11-05 PROCEDURE — 6370000000 HC RX 637 (ALT 250 FOR IP): Performed by: FAMILY MEDICINE

## 2024-11-05 PROCEDURE — 83880 ASSAY OF NATRIURETIC PEPTIDE: CPT

## 2024-11-05 PROCEDURE — 82805 BLOOD GASES W/O2 SATURATION: CPT

## 2024-11-05 PROCEDURE — 2580000003 HC RX 258: Performed by: STUDENT IN AN ORGANIZED HEALTH CARE EDUCATION/TRAINING PROGRAM

## 2024-11-05 PROCEDURE — 84100 ASSAY OF PHOSPHORUS: CPT

## 2024-11-05 PROCEDURE — 82962 GLUCOSE BLOOD TEST: CPT

## 2024-11-05 PROCEDURE — 83735 ASSAY OF MAGNESIUM: CPT

## 2024-11-05 PROCEDURE — 2060000000 HC ICU INTERMEDIATE R&B

## 2024-11-05 PROCEDURE — 84145 PROCALCITONIN (PCT): CPT

## 2024-11-05 PROCEDURE — 94640 AIRWAY INHALATION TREATMENT: CPT

## 2024-11-05 RX ORDER — DEXTROSE MONOHYDRATE 50 MG/ML
INJECTION, SOLUTION INTRAVENOUS CONTINUOUS
Status: DISCONTINUED | OUTPATIENT
Start: 2024-11-05 | End: 2024-11-06

## 2024-11-05 RX ADMIN — IPRATROPIUM BROMIDE 0.5 MG: 0.5 SOLUTION RESPIRATORY (INHALATION) at 20:53

## 2024-11-05 RX ADMIN — OXYCODONE HYDROCHLORIDE 5 MG: 5 TABLET ORAL at 06:45

## 2024-11-05 RX ADMIN — LOPERAMIDE HYDROCHLORIDE 2 MG: 2 CAPSULE ORAL at 07:56

## 2024-11-05 RX ADMIN — ALPRAZOLAM 0.5 MG: 0.5 TABLET ORAL at 18:44

## 2024-11-05 RX ADMIN — ALBUTEROL SULFATE 2.5 MG: 2.5 SOLUTION RESPIRATORY (INHALATION) at 00:18

## 2024-11-05 RX ADMIN — OXYCODONE HYDROCHLORIDE 5 MG: 5 TABLET ORAL at 19:51

## 2024-11-05 RX ADMIN — ALPRAZOLAM 0.5 MG: 0.5 TABLET ORAL at 11:45

## 2024-11-05 RX ADMIN — IPRATROPIUM BROMIDE 0.5 MG: 0.5 SOLUTION RESPIRATORY (INHALATION) at 08:36

## 2024-11-05 RX ADMIN — ALBUTEROL SULFATE 2.5 MG: 2.5 SOLUTION RESPIRATORY (INHALATION) at 20:52

## 2024-11-05 RX ADMIN — ATORVASTATIN CALCIUM 40 MG: 40 TABLET, FILM COATED ORAL at 19:51

## 2024-11-05 RX ADMIN — PIPERACILLIN AND TAZOBACTAM 3375 MG: 3; .375 INJECTION, POWDER, LYOPHILIZED, FOR SOLUTION INTRAVENOUS at 05:39

## 2024-11-05 RX ADMIN — MICONAZOLE NITRATE: 2 POWDER TOPICAL at 07:58

## 2024-11-05 RX ADMIN — MICONAZOLE NITRATE: 2 POWDER TOPICAL at 19:55

## 2024-11-05 RX ADMIN — DEXTROSE MONOHYDRATE 125 ML: 100 INJECTION, SOLUTION INTRAVENOUS at 07:55

## 2024-11-05 RX ADMIN — PIPERACILLIN AND TAZOBACTAM 3375 MG: 3; .375 INJECTION, POWDER, LYOPHILIZED, FOR SOLUTION INTRAVENOUS at 13:43

## 2024-11-05 RX ADMIN — Medication 16 G: at 11:10

## 2024-11-05 RX ADMIN — Medication 4 ML: at 20:54

## 2024-11-05 RX ADMIN — TRAZODONE HYDROCHLORIDE 50 MG: 50 TABLET ORAL at 19:50

## 2024-11-05 RX ADMIN — LORAZEPAM 1 MG: 1 TABLET ORAL at 22:44

## 2024-11-05 RX ADMIN — PIPERACILLIN AND TAZOBACTAM 3375 MG: 3; .375 INJECTION, POWDER, LYOPHILIZED, FOR SOLUTION INTRAVENOUS at 22:34

## 2024-11-05 RX ADMIN — ALBUTEROL SULFATE 2.5 MG: 2.5 SOLUTION RESPIRATORY (INHALATION) at 16:10

## 2024-11-05 RX ADMIN — LORAZEPAM 1 MG: 1 TABLET ORAL at 16:49

## 2024-11-05 RX ADMIN — DEXTROSE MONOHYDRATE: 50 INJECTION, SOLUTION INTRAVENOUS at 11:36

## 2024-11-05 RX ADMIN — OXYCODONE HYDROCHLORIDE 5 MG: 5 TABLET ORAL at 00:37

## 2024-11-05 RX ADMIN — MIDODRINE HYDROCHLORIDE 10 MG: 5 TABLET ORAL at 11:08

## 2024-11-05 RX ADMIN — Medication 4 ML: at 08:37

## 2024-11-05 RX ADMIN — OXYCODONE HYDROCHLORIDE 5 MG: 5 TABLET ORAL at 13:42

## 2024-11-05 RX ADMIN — ALBUTEROL SULFATE 2.5 MG: 2.5 SOLUTION RESPIRATORY (INHALATION) at 08:35

## 2024-11-05 RX ADMIN — LORAZEPAM 1 MG: 1 TABLET ORAL at 07:57

## 2024-11-05 RX ADMIN — LANSOPRAZOLE 30 MG: 30 TABLET, ORALLY DISINTEGRATING ORAL at 06:45

## 2024-11-05 RX ADMIN — DEXTROSE MONOHYDRATE 125 ML: 100 INJECTION, SOLUTION INTRAVENOUS at 03:40

## 2024-11-05 RX ADMIN — IPRATROPIUM BROMIDE 0.5 MG: 0.5 SOLUTION RESPIRATORY (INHALATION) at 16:08

## 2024-11-05 RX ADMIN — ALBUTEROL SULFATE 2.5 MG: 2.5 SOLUTION RESPIRATORY (INHALATION) at 11:21

## 2024-11-05 RX ADMIN — GABAPENTIN 100 MG: 100 CAPSULE ORAL at 07:56

## 2024-11-05 RX ADMIN — MIDODRINE HYDROCHLORIDE 10 MG: 5 TABLET ORAL at 06:35

## 2024-11-05 RX ADMIN — LOPERAMIDE HYDROCHLORIDE 2 MG: 2 CAPSULE ORAL at 00:39

## 2024-11-05 RX ADMIN — IPRATROPIUM BROMIDE 0.5 MG: 0.5 SOLUTION RESPIRATORY (INHALATION) at 11:22

## 2024-11-05 RX ADMIN — ALBUTEROL SULFATE 2.5 MG: 2.5 SOLUTION RESPIRATORY (INHALATION) at 04:10

## 2024-11-05 ASSESSMENT — PULMONARY FUNCTION TESTS
PIF_VALUE: 16
PIF_VALUE: 20
PIF_VALUE: 23
PIF_VALUE: 16
PIF_VALUE: 20
PIF_VALUE: 25
PIF_VALUE: 20
PIF_VALUE: 18
PIF_VALUE: 19
PIF_VALUE: 23
PIF_VALUE: 21

## 2024-11-05 ASSESSMENT — PAIN SCALES - GENERAL
PAINLEVEL_OUTOF10: 7
PAINLEVEL_OUTOF10: 0
PAINLEVEL_OUTOF10: 10
PAINLEVEL_OUTOF10: 10
PAINLEVEL_OUTOF10: 7
PAINLEVEL_OUTOF10: 2

## 2024-11-05 ASSESSMENT — PAIN - FUNCTIONAL ASSESSMENT
PAIN_FUNCTIONAL_ASSESSMENT: ACTIVITIES ARE NOT PREVENTED
PAIN_FUNCTIONAL_ASSESSMENT: PREVENTS OR INTERFERES SOME ACTIVE ACTIVITIES AND ADLS

## 2024-11-05 ASSESSMENT — PAIN DESCRIPTION - DESCRIPTORS
DESCRIPTORS: ACHING;SORE
DESCRIPTORS: ACHING;DISCOMFORT
DESCRIPTORS: ACHING

## 2024-11-05 ASSESSMENT — PAIN DESCRIPTION - LOCATION
LOCATION: BACK
LOCATION: GENERALIZED
LOCATION: BACK

## 2024-11-05 ASSESSMENT — PAIN DESCRIPTION - FREQUENCY: FREQUENCY: CONTINUOUS

## 2024-11-05 ASSESSMENT — PAIN DESCRIPTION - PAIN TYPE: TYPE: CHRONIC PAIN

## 2024-11-05 ASSESSMENT — PAIN DESCRIPTION - ORIENTATION: ORIENTATION: LOWER;MID

## 2024-11-05 ASSESSMENT — PAIN DESCRIPTION - ONSET: ONSET: GRADUAL

## 2024-11-05 NOTE — PROGRESS NOTES
V2.0  [facility] Hospitalist Progress Note      Name:  Tico Day /Age/Sex: 1960  (64 y.o. male)   MRN & CSN:  [id number] & [id number] Encounter Date/Time: 2024 10:55 AM EDT    Location:  -A PCP: Carlos Nina MD       Hospital Day: 12    Assessment and Plan:   Tico Day is a 64 y.o. male with pmh of  ESRD on HD , chronic systolic heart failure, chronic respiratory failure with tracheostomy since , left atrial thrombus, wide-complex tachycardia, CAD with history of PCI in , COPD, anemia of chronic disease, obstructive sleep apnea, anxiety, chronic pain with opioid dependence  who presents with Acute on chronic respiratory failure      Plan:  Tracheostomy dependence  Acute on chronic respiratory failure with hypoxia requiring ventilatory support  Secondary to acute on chronic systolic heart failure, possible pneumonia  Chest x-ray personally reviewed bilateral pulmonary infiltrates R>L and cardiomegaly Pro-BNP 43,703.  Last echocardiogram from 2024 reviewed EF of 40%.  Negative respiratory viral panel  monitor intake and output measure daily weight  Nephrology consultation for inpatient management of HD and volume management  Sputum culture growing Pseudomonas aeruginosa sensitive to Zosyn switched from meropenem to Zosyn; ID team consulted - plan for 2 weeks total coverage, continue zosyn - end date 2024  Not tolerating pressure support ventilation  Consulted pulmonology for vent management     Infected TDC catheter, proteus mirabilis  Painful, tender, erythematous central line   ID team onboard  IR team removed TDC and tip sent for culture - growing Proteus and Staph epi  Blood cultures have been negative  Currently had temp HD cath  TDC placement planned f today    ESRD on HD   Nephrology consultation for inpatient management of hemodialysis    Hypoglycemia  -Tube feeds currently on hold for tunneled dialysis  k/uL   Brain Natriuretic Peptide    Collection Time: 11/05/24  5:25 AM   Result Value Ref Range    NT Pro-BNP 30,376 (H) 0 - 125 pg/mL   Procalcitonin    Collection Time: 11/05/24  5:25 AM   Result Value Ref Range    Procalcitonin 2.68 ng/mL   POCT Glucose    Collection Time: 11/05/24  7:51 AM   Result Value Ref Range    POC Glucose 68 (L) 74 - 99 mg/dL   POCT Glucose    Collection Time: 11/05/24  8:17 AM   Result Value Ref Range    POC Glucose 91 74 - 99 mg/dL   POCT Glucose    Collection Time: 11/05/24 10:58 AM   Result Value Ref Range    POC Glucose 71 (L) 74 - 99 mg/dL        Imaging/Diagnostics Last 24 Hours   CT ABDOMEN PELVIS WO CONTRAST Additional Contrast? None    Result Date: 10/26/2024  INDICATION: Right-sided abdominal tenderness COMPARISON: None available TECHNIQUE: CT abdomen/pelvis obtained without IV contrast per departmental protocol. FINDINGS: Extremely limited evaluation due to extensive artifact from poor arm positioning and right upper quadrant clips. Small bilateral pleural effusions with adjacent atelectasis/consolidations. Bibasilar groundglass opacities. Right lung base subcentimeter nodular opacities, incompletely visualized. Heavy coronary artery calcifications. Cardiomegaly. Cirrhotic appearing liver. No hepatic lesions. Moderate ascites. No calcified gallstones. No pancreatic lesions. Splenomegaly. Splenic granulomata. No significant adrenal nodules. No hydronephrosis or obstructing renal stones. Nonobstructing left renal lower pole stones measuring 5 mm. Colonic diverticulosis. No evidence of bowel obstruction or inflammation. Heavy atherosclerotic vascular disease. No free air. No lymphadenopathy. Intact osseous structures. Umbilical fat-containing hernia with a sac measuring 6.2 cm in diameter and a neck measuring 1.8 cm in diameter.     Extremely limited evaluation due to extensive artifact from poor arm positioning and right upper quadrant clips. If suspicion for gallbladder

## 2024-11-05 NOTE — PROGRESS NOTES
Pt POCT blood glucose 65. Pt refusing PRN hypoglycemic replacement protocols. Pt requesting apple juice to correct low blood sugar. Pt given 240ml of apple juice.

## 2024-11-05 NOTE — PROGRESS NOTES
follow-up dedicated CT of the chest. Cardiomegaly.Umbilical fat-containing hernia.    11/1/2024 XR Chest Portable:  IMPRESSION:  Left IJ line placement without pneumothorax.    11/4/2024 XR Chest Portable:  IMPRESSION:  1. Small bilateral pleural effusions with adjacent dependent atelectasis or  infiltrates which are unchanged.  2. Cardiomegaly and prominence of the pulmonary interstitium which is unchanged.     Labs:    Recent Results (from the past 24 hour(s))   Culture, Respiratory    Collection Time: 11/04/24 11:21 AM    Specimen: Tracheal Aspirate   Result Value Ref Range    Specimen Description .TRACHEAL ASPIRATE     Direct Exam RARE NEUTROPHILS     Direct Exam RARE EPITHELIAL CELLS     Direct Exam NO ORGANISMS SEEN     Culture PENDING    Blood Gas, Venous    Collection Time: 11/04/24 11:27 AM   Result Value Ref Range    pH, Jose 7.409 7.320 - 7.430    pCO2, Jose 46.4 38 - 54 mm Hg    PO2, Jose 39.3 23 - 48 mm Hg    HCO3, Venous 28.7 22 - 29 mmol/L    Positive Base Excess, Jose 3.5 (H) 0 - 3 mmol/L    Oxyhemoglobin 72.2 %    Carboxyhemoglobin 0.6 0.5 - 1.5 %    Methemoglobin 0.5 0.5 - 1.5 %    Pt Temp 37.0     Umang Test NOT APPLICABLE    POCT Glucose    Collection Time: 11/04/24  6:38 PM   Result Value Ref Range    POC Glucose 77 74 - 99 mg/dL   POCT Glucose    Collection Time: 11/04/24 10:42 PM   Result Value Ref Range    POC Glucose 65 (L) 74 - 99 mg/dL   POCT Glucose    Collection Time: 11/04/24 11:01 PM   Result Value Ref Range    POC Glucose 72 (L) 74 - 99 mg/dL   POCT Glucose    Collection Time: 11/05/24 12:14 AM   Result Value Ref Range    POC Glucose 76 74 - 99 mg/dL   POCT Glucose    Collection Time: 11/05/24  3:31 AM   Result Value Ref Range    POC Glucose 60 (L) 74 - 99 mg/dL   POCT Glucose    Collection Time: 11/05/24  4:03 AM   Result Value Ref Range    POC Glucose 87 74 - 99 mg/dL   Magnesium    Collection Time: 11/05/24  5:25 AM   Result Value Ref Range    Magnesium 1.9 1.8 - 2.4 mg/dL   Phosphorus     Collection Time: 11/05/24  8:17 AM   Result Value Ref Range    POC Glucose 91 74 - 99 mg/dL     CULTURE results: Invalid input(s): \"BLOOD CULTURE\", \"URINE CULTURE\", \"SURGICAL CULTURE\"    Diagnosis:  Patient Active Problem List   Diagnosis    Acute on chronic respiratory failure    Moderate malnutrition (HCC)    Pneumonia of both lower lobes due to Pseudomonas species (HCC)    Central line infection    Acute on chronic hypoxic respiratory failure       Active Problems  Principal Problem:    Acute on chronic respiratory failure  Active Problems:    Moderate malnutrition (HCC)    Pneumonia of both lower lobes due to Pseudomonas species (HCC)    Central line infection    Acute on chronic hypoxic respiratory failure  Resolved Problems:    * No resolved hospital problems. *    Electronically signed by: Electronically signed by SILVANA Fagan CNP on 11/5/2024 at 8:40 AM

## 2024-11-05 NOTE — PROGRESS NOTES
Planning on placing a tunneled HD cath tomorrow 11/6 if blood cultures are clear, and all are in agreement.

## 2024-11-05 NOTE — PROGRESS NOTES
Comprehensive Nutrition Assessment    Type and Reason for Visit:  Reassess    Nutrition Recommendations/Plan:   NPO while on vent support; when off may resume regular diet  Please odcument all po intakes in flowhseets  Recommend d/c IV dextrose and restart TF- Nepro (renal formula) @ 55mL/hr continuous w/ 30mL free water flush Q1H provides 2376kcal, 107g PRO, and 1680mL total fluids per day  Monitor GI status, weights, glucose, lytes, renal fx, POC     Malnutrition Assessment:  Malnutrition Status:  Moderate malnutrition (10/27/24 1329)    Context:  Social/Environmental Circumstances       Nutrition Assessment:    Pt currently on vent support at visit, TF off. Pt reports he is not eating much \"yet\" but states he will when he feels better. Will modify TF rate to meet  >75% of estimated needs while on vent. Continue to follow at high nutrition risk.    Nutrition Related Findings:    +D5W @ 50ml/hr, glucose 68-91, Cr 2.7, GFR 24 Wound Type: Stage II, Pressure Injury       Current Nutrition Intake & Therapies:    Average Meal Intake: Unable to assess  Average Supplements Intake: None Ordered  ADULT TUBE FEEDING; PEG; Renal Formula; Continuous; 20; Yes; 20; Q 4 hours; 40; 30; Q 4 hours  ADULT DIET; Regular; Low Potassium (Less than 3000 mg/day); 1500 ml  Current Tube Feeding (TF) Orders:  Feeding Route: PEG  Formula: Renal Formula  Schedule: Continuous  Feeding Regimen: 40 ml/hr  Additives/Modulars: None  Water Flushes: 30 ml every 4 hours  Current TF Provides: 1728 calories, 77 g protein, 877 ml free water in TF and flushes  Additional Calorie Sources:   204kcal from dex    Anthropometric Measures:  Height: 175.3 cm (5' 9\")  Ideal Body Weight (IBW): 160 lbs (73 kg)    Admission Body Weight: 99 kg (218 lb 4.1 oz)  Current Body Weight: 97.3 kg (214 lb 8.1 oz), 136.4 % IBW. Weight Source: Bed scale  Current BMI (kg/m2): 31.7  Usual Body Weight: 96.2 kg (212 lb) (12/6/23 chart review)     % Weight Change (Calculated):

## 2024-11-05 NOTE — CONSULTS
[facility] Contin[facility] Nurse  Consult Note       Tico Day  AGE: 64 y.o.   GENDER: male  : 1960  TODAY'S DATE:  2024    Subjective:     Reason for CWOCN Evaluation and Assessment: wound reassessment      Tico Day is a 64 y.o. male referred by:   [x] Physician  [] Nursing  [] Other:     Wound Identification:  Wound Type: pressure and MASD-intertriginous and IAD  Contributing Factors: chronic pressure, decreased mobility, obesity, decreased tissue oxygenation, malnutrition, incontinence of stool, and incontinence of urine      PAST MEDICAL HISTORY    History reviewed. No pertinent past medical history.    PAST SURGICAL HISTORY    History reviewed. No pertinent surgical history.    FAMILY HISTORY    History reviewed. No pertinent family history.    SOCIAL HISTORY    Social History     Tobacco Use    Smoking status: Former     Current packs/day: 0.50     Average packs/day: 0.5 packs/day for 32.7 years (16.3 ttl pk-yrs)     Types: Cigarettes     Start date: 3/7/1992     Passive exposure: Never    Smokeless tobacco: Never   Substance Use Topics    Alcohol use: Never       ALLERGIES    Allergies   Allergen Reactions    Reglan [Metoclopramide]     Remeron [Mirtazapine]        MEDICATIONS    No current facility-administered medications on file prior to encounter.     Current Outpatient Medications on File Prior to Encounter   Medication Sig Dispense Refill    albuterol (PROVENTIL) (2.5 MG/3ML) 0.083% nebulizer solution Inhale 3 mLs into the lungs every 4 hours      atorvastatin (LIPITOR) 40 MG tablet 1 tablet by Enteral route nightly      loperamide (IMODIUM) 2 MG capsule Take 1 capsule by mouth as needed for Diarrhea      melatonin 3 MG TABS tablet Take 1 tablet by mouth nightly as needed (insomnia)      midodrine (PROAMATINE) 10 MG tablet Take 1 tablet by mouth as needed (Every monday, wednesday, and friday for hypotension)      oxyCODONE (ROXICODONE) 5 MG immediate release tablet 1  recommended. Pt is a high risk for skin breakdown AEB [facility]. Follow [facility] orders.     Specialty Bed Required : yes  [x] Low Air Loss   [x] Pressure Redistribution  [] Fluid Immersion  [] Bariatric  [] Total Pressure Relief  [] Other:     Discharge Plan:  Placement for patient upon discharge: ECF  Hospice Care: no  Patient appropriate for Outpatient Wound Care Center: no    Patient/Caregiver Teaching:  Level of patient/caregiver understanding able to:   Voiced understanding.        Electronically signed by Amador Dahl RN, CWOCN on 11/5/2024 at 12:27 PM

## 2024-11-05 NOTE — PROGRESS NOTES
Pulmonary and Critical Care  Progress Note    Subjective:   The patient is comfortable in bed.  Shortness of breath none.  Chest pain none.  Addressing respiratory complaints Patient is negative for  hemoptysis and cyanosis  CONSTITUTIONAL:  negative for fevers and chills      Past Medical History:     has no past medical history on file.   has no past surgical history on file.   reports that he has quit smoking. His smoking use included cigarettes. He started smoking about 32 years ago. He has a 16.3 pack-year smoking history. He has never been exposed to tobacco smoke. He has never used smokeless tobacco. He reports that he does not drink alcohol.  Family history:  family history is not on file.    Allergies   Allergen Reactions    Reglan [Metoclopramide]     Remeron [Mirtazapine]      Social History:    Reviewed; no changes    Objective:   PHYSICAL EXAM:        VITALS:  BP (!) 96/52   Pulse 67   Temp 98 °F (36.7 °C) (Oral)   Resp 17   Ht 1.753 m (5' 9\")   Wt 97.3 kg (214 lb 8.1 oz)   SpO2 98%   BMI 31.68 kg/m²     24HR INTAKE/OUTPUT:    Intake/Output Summary (Last 24 hours) at 11/5/2024 1044  Last data filed at 11/5/2024 0745  Gross per 24 hour   Intake 1302.66 ml   Output 745 ml   Net 557.66 ml       CONSTITUTIONAL:  awake  LUNGS:  decreased breath sounds, occ basilar crackles.  CARDIOVASCULAR:  normal S1 and S2 and negative JVD  ABD:Abdomen soft, non-tender. BS normal. No masses,  No organomegaly  NEURO:Alert on vent.  DATA:    CBC:  Recent Labs     11/04/24  0646 11/05/24  0525   WBC 4.0 3.7*   RBC 2.86* 2.95*   HGB 8.1* 8.4*   HCT 27.0* 28.1*    121*   MCV 94.4 95.3   MCH 28.3 28.5   MCHC 30.0* 29.9*   RDW 17.5* 17.7*      BMP:  Recent Labs     11/03/24  0441 11/05/24  0525   * 134*   K 3.8 4.1    98*   CO2 28 30   BUN 17 18   CREATININE 2.5* 2.7*   CALCIUM 11.8* 11.8*   GLUCOSE 91 73*      ABG:  No results for input(s): \"PH\", \"PO2ART\", \"CJN1ZTT\", \"HCO3\", \"BEART\", \"O2SAT\" in the last  72 hours.  Lab Results   Component Value Date    PROBNP 30,376 (H) 11/05/2024    PROBNP 43,703 (H) 10/25/2024     No results found for: \"CULTRESP\"    Radiology Review:  Pertinent images / reports were reviewed as a part of this visit.    Assessment:     Patient Active Problem List   Diagnosis    Acute on chronic respiratory failure    Moderate malnutrition (HCC)    Pneumonia of both lower lobes due to Pseudomonas species (HCC)    Central line infection    Acute on chronic hypoxic respiratory failure       Plan:   1. Overall the patient has improved  2. Wean per protocol.  3. Discussed with the RN.    Bashir Bragg MD   11/5/2024  10:44 AM

## 2024-11-06 ENCOUNTER — APPOINTMENT (OUTPATIENT)
Dept: INTERVENTIONAL RADIOLOGY/VASCULAR | Age: 64
End: 2024-11-06
Payer: MEDICARE

## 2024-11-06 LAB
ANION GAP SERPL CALCULATED.3IONS-SCNC: 7 MMOL/L (ref 9–17)
BASOPHILS # BLD: 0.04 K/UL
BASOPHILS NFR BLD: 1 % (ref 0–1)
BUN SERPL-MCNC: 20 MG/DL (ref 7–20)
CALCIUM SERPL-MCNC: 12 MG/DL (ref 8.3–10.6)
CHLORIDE SERPL-SCNC: 96 MMOL/L (ref 99–110)
CO2 SERPL-SCNC: 27 MMOL/L (ref 21–32)
CREAT SERPL-MCNC: 3.1 MG/DL (ref 0.8–1.3)
EOSINOPHIL # BLD: 0.59 K/UL
EOSINOPHILS RELATIVE PERCENT: 12 % (ref 0–3)
ERYTHROCYTE [DISTWIDTH] IN BLOOD BY AUTOMATED COUNT: 17.6 % (ref 11.7–14.9)
GFR, ESTIMATED: 20 ML/MIN/1.73M2
GLUCOSE BLD-MCNC: 70 MG/DL (ref 74–99)
GLUCOSE SERPL-MCNC: 67 MG/DL (ref 74–99)
HCT VFR BLD AUTO: 27.7 % (ref 42–52)
HGB BLD-MCNC: 8.5 G/DL (ref 13.5–18)
IMM GRANULOCYTES # BLD AUTO: 0.02 K/UL
IMM GRANULOCYTES NFR BLD: 0 %
LYMPHOCYTES NFR BLD: 0.83 K/UL
LYMPHOCYTES RELATIVE PERCENT: 17 % (ref 24–44)
MAGNESIUM SERPL-MCNC: 1.9 MG/DL (ref 1.8–2.4)
MCH RBC QN AUTO: 28.5 PG (ref 27–31)
MCHC RBC AUTO-ENTMCNC: 30.7 G/DL (ref 32–36)
MCV RBC AUTO: 93 FL (ref 78–100)
MICROORGANISM SPEC CULT: ABNORMAL
MICROORGANISM SPEC CULT: ABNORMAL
MICROORGANISM/AGENT SPEC: ABNORMAL
MONOCYTES NFR BLD: 0.42 K/UL
MONOCYTES NFR BLD: 9 % (ref 0–4)
NEUTROPHILS NFR BLD: 60 % (ref 36–66)
NEUTS SEG NFR BLD: 2.87 K/UL
PHOSPHATE SERPL-MCNC: 4.4 MG/DL (ref 2.5–4.9)
PLATELET # BLD AUTO: 126 K/UL (ref 140–440)
PMV BLD AUTO: 8.9 FL (ref 7.5–11.1)
POTASSIUM SERPL-SCNC: 4.3 MMOL/L (ref 3.5–5.1)
RBC # BLD AUTO: 2.98 M/UL (ref 4.6–6.2)
SODIUM SERPL-SCNC: 130 MMOL/L (ref 136–145)
SPECIMEN DESCRIPTION: ABNORMAL
WBC OTHER # BLD: 4.8 K/UL (ref 4–10.5)

## 2024-11-06 PROCEDURE — 6360000002 HC RX W HCPCS: Performed by: STUDENT IN AN ORGANIZED HEALTH CARE EDUCATION/TRAINING PROGRAM

## 2024-11-06 PROCEDURE — 87071 CULTURE AEROBIC QUANT OTHER: CPT

## 2024-11-06 PROCEDURE — 6360000002 HC RX W HCPCS: Performed by: INTERNAL MEDICINE

## 2024-11-06 PROCEDURE — 2060000000 HC ICU INTERMEDIATE R&B

## 2024-11-06 PROCEDURE — 6370000000 HC RX 637 (ALT 250 FOR IP): Performed by: STUDENT IN AN ORGANIZED HEALTH CARE EDUCATION/TRAINING PROGRAM

## 2024-11-06 PROCEDURE — 80048 BASIC METABOLIC PNL TOTAL CA: CPT

## 2024-11-06 PROCEDURE — 76937 US GUIDE VASCULAR ACCESS: CPT

## 2024-11-06 PROCEDURE — 94003 VENT MGMT INPAT SUBQ DAY: CPT

## 2024-11-06 PROCEDURE — 6370000000 HC RX 637 (ALT 250 FOR IP): Performed by: INTERNAL MEDICINE

## 2024-11-06 PROCEDURE — 82962 GLUCOSE BLOOD TEST: CPT

## 2024-11-06 PROCEDURE — 2500000003 HC RX 250 WO HCPCS: Performed by: RADIOLOGY

## 2024-11-06 PROCEDURE — 84100 ASSAY OF PHOSPHORUS: CPT

## 2024-11-06 PROCEDURE — 99153 MOD SED SAME PHYS/QHP EA: CPT

## 2024-11-06 PROCEDURE — 2700000000 HC OXYGEN THERAPY PER DAY

## 2024-11-06 PROCEDURE — 2580000003 HC RX 258: Performed by: INTERNAL MEDICINE

## 2024-11-06 PROCEDURE — 77001 FLUOROGUIDE FOR VEIN DEVICE: CPT

## 2024-11-06 PROCEDURE — 2580000003 HC RX 258: Performed by: STUDENT IN AN ORGANIZED HEALTH CARE EDUCATION/TRAINING PROGRAM

## 2024-11-06 PROCEDURE — 2580000003 HC RX 258: Performed by: NURSE PRACTITIONER

## 2024-11-06 PROCEDURE — C1894 INTRO/SHEATH, NON-LASER: HCPCS

## 2024-11-06 PROCEDURE — 94761 N-INVAS EAR/PLS OXIMETRY MLT: CPT

## 2024-11-06 PROCEDURE — 94640 AIRWAY INHALATION TREATMENT: CPT

## 2024-11-06 PROCEDURE — 6360000002 HC RX W HCPCS: Performed by: NURSE PRACTITIONER

## 2024-11-06 PROCEDURE — 99152 MOD SED SAME PHYS/QHP 5/>YRS: CPT

## 2024-11-06 PROCEDURE — 36558 INSERT TUNNELED CV CATH: CPT

## 2024-11-06 PROCEDURE — 36415 COLL VENOUS BLD VENIPUNCTURE: CPT

## 2024-11-06 PROCEDURE — 36558 INSERT TUNNELED CV CATH: CPT | Performed by: RADIOLOGY

## 2024-11-06 PROCEDURE — 85025 COMPLETE CBC W/AUTO DIFF WBC: CPT

## 2024-11-06 PROCEDURE — 90935 HEMODIALYSIS ONE EVALUATION: CPT

## 2024-11-06 PROCEDURE — 6360000002 HC RX W HCPCS: Performed by: RADIOLOGY

## 2024-11-06 PROCEDURE — 6360000002 HC RX W HCPCS

## 2024-11-06 PROCEDURE — 83735 ASSAY OF MAGNESIUM: CPT

## 2024-11-06 RX ORDER — LIDOCAINE HYDROCHLORIDE AND EPINEPHRINE BITARTRATE 20; .01 MG/ML; MG/ML
INJECTION, SOLUTION SUBCUTANEOUS PRN
Status: COMPLETED | OUTPATIENT
Start: 2024-11-06 | End: 2024-11-06

## 2024-11-06 RX ORDER — DIPHENHYDRAMINE HYDROCHLORIDE 50 MG/ML
INJECTION INTRAMUSCULAR; INTRAVENOUS PRN
Status: COMPLETED | OUTPATIENT
Start: 2024-11-06 | End: 2024-11-06

## 2024-11-06 RX ORDER — LIDOCAINE HYDROCHLORIDE 10 MG/ML
INJECTION, SOLUTION EPIDURAL; INFILTRATION; INTRACAUDAL; PERINEURAL PRN
Status: COMPLETED | OUTPATIENT
Start: 2024-11-06 | End: 2024-11-06

## 2024-11-06 RX ORDER — FENTANYL CITRATE 50 UG/ML
INJECTION, SOLUTION INTRAMUSCULAR; INTRAVENOUS PRN
Status: COMPLETED | OUTPATIENT
Start: 2024-11-06 | End: 2024-11-06

## 2024-11-06 RX ORDER — MIDAZOLAM HYDROCHLORIDE 2 MG/2ML
INJECTION, SOLUTION INTRAMUSCULAR; INTRAVENOUS PRN
Status: COMPLETED | OUTPATIENT
Start: 2024-11-06 | End: 2024-11-06

## 2024-11-06 RX ADMIN — LIDOCAINE HYDROCHLORIDE,EPINEPHRINE BITARTRATE 10 ML: 20; .01 INJECTION, SOLUTION INFILTRATION; PERINEURAL at 14:59

## 2024-11-06 RX ADMIN — MIDODRINE HYDROCHLORIDE 10 MG: 5 TABLET ORAL at 06:24

## 2024-11-06 RX ADMIN — LIDOCAINE HYDROCHLORIDE 15 ML: 10 INJECTION, SOLUTION EPIDURAL; INFILTRATION; INTRACAUDAL; PERINEURAL at 14:53

## 2024-11-06 RX ADMIN — PIPERACILLIN AND TAZOBACTAM 4500 MG: 4; .5 INJECTION, POWDER, FOR SOLUTION INTRAVENOUS at 18:26

## 2024-11-06 RX ADMIN — ALPRAZOLAM 0.5 MG: 0.5 TABLET ORAL at 01:14

## 2024-11-06 RX ADMIN — ALPRAZOLAM 0.5 MG: 0.5 TABLET ORAL at 22:42

## 2024-11-06 RX ADMIN — OXYCODONE HYDROCHLORIDE 5 MG: 5 TABLET ORAL at 09:29

## 2024-11-06 RX ADMIN — ALBUTEROL SULFATE 2.5 MG: 2.5 SOLUTION RESPIRATORY (INHALATION) at 23:58

## 2024-11-06 RX ADMIN — PIPERACILLIN AND TAZOBACTAM 3375 MG: 3; .375 INJECTION, POWDER, LYOPHILIZED, FOR SOLUTION INTRAVENOUS at 06:23

## 2024-11-06 RX ADMIN — IPRATROPIUM BROMIDE 0.5 MG: 0.5 SOLUTION RESPIRATORY (INHALATION) at 16:42

## 2024-11-06 RX ADMIN — MIDAZOLAM HYDROCHLORIDE 0.5 MG: 1 INJECTION, SOLUTION INTRAMUSCULAR; INTRAVENOUS at 14:53

## 2024-11-06 RX ADMIN — GABAPENTIN 100 MG: 100 CAPSULE ORAL at 09:30

## 2024-11-06 RX ADMIN — ALBUTEROL SULFATE 2.5 MG: 2.5 SOLUTION RESPIRATORY (INHALATION) at 12:14

## 2024-11-06 RX ADMIN — LANSOPRAZOLE 30 MG: 30 TABLET, ORALLY DISINTEGRATING ORAL at 06:24

## 2024-11-06 RX ADMIN — ALBUTEROL SULFATE 2.5 MG: 2.5 SOLUTION RESPIRATORY (INHALATION) at 00:26

## 2024-11-06 RX ADMIN — ALPRAZOLAM 0.5 MG: 0.5 TABLET ORAL at 09:29

## 2024-11-06 RX ADMIN — IPRATROPIUM BROMIDE 0.5 MG: 0.5 SOLUTION RESPIRATORY (INHALATION) at 09:06

## 2024-11-06 RX ADMIN — MICONAZOLE NITRATE: 2 POWDER TOPICAL at 09:30

## 2024-11-06 RX ADMIN — LORAZEPAM 1 MG: 1 TABLET ORAL at 21:02

## 2024-11-06 RX ADMIN — MICONAZOLE NITRATE: 2 POWDER TOPICAL at 21:03

## 2024-11-06 RX ADMIN — Medication 4 ML: at 19:59

## 2024-11-06 RX ADMIN — LORAZEPAM 1 MG: 1 TABLET ORAL at 06:24

## 2024-11-06 RX ADMIN — ALBUTEROL SULFATE 2.5 MG: 2.5 SOLUTION RESPIRATORY (INHALATION) at 16:41

## 2024-11-06 RX ADMIN — LOPERAMIDE HYDROCHLORIDE 2 MG: 2 CAPSULE ORAL at 09:29

## 2024-11-06 RX ADMIN — ALBUTEROL SULFATE 2.5 MG: 2.5 SOLUTION RESPIRATORY (INHALATION) at 19:57

## 2024-11-06 RX ADMIN — IPRATROPIUM BROMIDE 0.5 MG: 0.5 SOLUTION RESPIRATORY (INHALATION) at 19:58

## 2024-11-06 RX ADMIN — IPRATROPIUM BROMIDE 0.5 MG: 0.5 SOLUTION RESPIRATORY (INHALATION) at 12:15

## 2024-11-06 RX ADMIN — DEXTROSE MONOHYDRATE: 50 INJECTION, SOLUTION INTRAVENOUS at 06:50

## 2024-11-06 RX ADMIN — ALBUTEROL SULFATE 2.5 MG: 2.5 SOLUTION RESPIRATORY (INHALATION) at 09:04

## 2024-11-06 RX ADMIN — OXYCODONE HYDROCHLORIDE 5 MG: 5 TABLET ORAL at 01:14

## 2024-11-06 RX ADMIN — DIPHENHYDRAMINE HYDROCHLORIDE 25 MG: 50 INJECTION, SOLUTION INTRAMUSCULAR; INTRAVENOUS at 14:52

## 2024-11-06 RX ADMIN — ALBUTEROL SULFATE 2.5 MG: 2.5 SOLUTION RESPIRATORY (INHALATION) at 04:28

## 2024-11-06 RX ADMIN — Medication 4 ML: at 09:05

## 2024-11-06 RX ADMIN — TRAZODONE HYDROCHLORIDE 50 MG: 50 TABLET ORAL at 21:02

## 2024-11-06 RX ADMIN — FENTANYL CITRATE 25 MCG: 50 INJECTION, SOLUTION INTRAMUSCULAR; INTRAVENOUS at 14:53

## 2024-11-06 RX ADMIN — EPOETIN ALFA-EPBX 10000 UNITS: 10000 INJECTION, SOLUTION INTRAVENOUS; SUBCUTANEOUS at 09:23

## 2024-11-06 RX ADMIN — OXYCODONE HYDROCHLORIDE 5 MG: 5 TABLET ORAL at 22:43

## 2024-11-06 RX ADMIN — MIDODRINE HYDROCHLORIDE 10 MG: 5 TABLET ORAL at 13:13

## 2024-11-06 RX ADMIN — OXYCODONE HYDROCHLORIDE 5 MG: 5 TABLET ORAL at 16:29

## 2024-11-06 RX ADMIN — ALPRAZOLAM 0.5 MG: 0.5 TABLET ORAL at 16:29

## 2024-11-06 RX ADMIN — ATORVASTATIN CALCIUM 40 MG: 40 TABLET, FILM COATED ORAL at 21:02

## 2024-11-06 ASSESSMENT — PULMONARY FUNCTION TESTS
PIF_VALUE: 15
PIF_VALUE: 16
PIF_VALUE: 16
PIF_VALUE: 15
PIF_VALUE: 16
PIF_VALUE: 15
PIF_VALUE: 15
PIF_VALUE: 16
PIF_VALUE: 15
PIF_VALUE: 16
PIF_VALUE: 15
PIF_VALUE: 16
PIF_VALUE: 16
PIF_VALUE: 15
PIF_VALUE: 15
PIF_VALUE: 16
PIF_VALUE: 15
PIF_VALUE: 15
PIF_VALUE: 16

## 2024-11-06 ASSESSMENT — PAIN SCALES - GENERAL
PAINLEVEL_OUTOF10: 0
PAINLEVEL_OUTOF10: 9
PAINLEVEL_OUTOF10: 0
PAINLEVEL_OUTOF10: 8

## 2024-11-06 ASSESSMENT — PAIN DESCRIPTION - LOCATION
LOCATION: NECK
LOCATION: GENERALIZED
LOCATION: NECK

## 2024-11-06 ASSESSMENT — PAIN DESCRIPTION - DESCRIPTORS: DESCRIPTORS: ACHING;SORE;CRAMPING

## 2024-11-06 NOTE — OR NURSING
1427 pt transported to IR room with respiratory.    Pt had a tunneled hd  cath placed and temp hd cath removed by Dr Alvarado

## 2024-11-06 NOTE — PROGRESS NOTES
Pt completed 3 hours dialysis today removing no fluid.  Tolerated well, no distress noted.  Right temporary HD catheter used for access ran good, both lumens normal saline locked and capped.  Retactrit given per order.  Pt denies needs.  Report to MAYI Brink.      Patient Name: Tico Day  Patient : 1960  MRN: [id number]     Acct: [id number]  Date of Admission: 10/25/2024  Room/Bed:   Code Status:  Full Code  Allergies:   Allergies   Allergen Reactions    Reglan [Metoclopramide]     Remeron [Mirtazapine]      Diagnosis:    Patient Active Problem List   Diagnosis    Acute on chronic respiratory failure    Moderate malnutrition (HCC)    Pneumonia of both lower lobes due to Pseudomonas species (HCC)    Central line infection    Acute on chronic hypoxic respiratory failure         Treatment:  Hemodialysis 1:1  Priority: Routine  Location: ICU    Diabetic: No  NPO: Yes  Isolation Precautions: Dialysis, Contact     Consent for Treatment Verified: Yes  Blood Consent Verified: Not Applicable     Safety Verified: Identify (I), Consent (C), Equipment (E), HepB Status (B), Orders Complete (O), Access Verified (A), and Timeliness (T)  Time out performed prior to access at 0824 hours.    Report Received from Primary RN at 0805 hours.  Primary RN (First Initial, Last Name, Title): DWAYNE Hicks RN  Incapacitated Nurse Education Completed: Yes     HBsAg ONLY:  Date Drawn: 2024       Results: Negative  HBsAb:  Date Drawn:  2024       Results: Susceptible <10    Order  Dialysis Bath  K+ (Potassium): 4  Ca+ (Calcium): 2.5  Na+ (Sodium): 138  HCO3 (Bicarb): 35  Bicarbonate Concentrate Lot No.: [id number]  Acid Concentrate Lot No.: [id number]     Na+ Modeling: Not Applicable  Dialyzer: F180  Dialysate Temperature (C):  36  Blood Flow Rate (BFR):  350   Dialysate Flow Rate (DFR):   700        Access to be Utilized   Access: Non-tunneled Catheter  Location: Internal Jugular  Side: Left   Needle

## 2024-11-06 NOTE — OR NURSING
Latex:  Patient denies allergy to latex.  Medications reviewed with patient.  Tobacco use verified.  Allergies verified.    CHIEF COMPLAINT  Bilateral knee pain and \"cracking.\"  Sometimes he gets \"loud cracks\" and the knees will feel weak.  When climbing ladders the legs will shake.  Used to do a lot of track work which may have added to his knee problem.  He cannot squat.    REFERRING PHYSICIAN:  Taurus Guerra MD  PRIMARY CARE PROVIDER - Taurus Guerra MD     HISTORY OF PRESENT ILLNESS        Occupation:  retail       Current work status:  working regular duty  1.  When did the main problem begin?   Many years  2.  Describe the injury and/or pain or other complaints:  \"wear and tear\"  3.  Locate the area of the problem/pain:   medial, lateral and anterior   4.  Is there any other pertinent background information?   None  5.  Has the patient had any treatment yet?   Rest, ibuprofen      PAST MEDICAL HISTORY  Past Medical History:   Diagnosis Date   • Anxiety    • Bipolar 1 disorder (CMS/[facility])    • Depressive disorder    • Health supervision of other healthy infant or child receiving care    • Microcephalus (CMS/[facility]) 10/97    microcephaly   • RAD (reactive airway disease)        PAST SURGICAL HISTORY  Past Surgical History:   Procedure Laterality Date   • ------------other-------------      Testicular torsion   • Past surgical history      None       REVIEW OF SYSTEMS:    Gastrointestinal: none  Genitourinary:none  Nervous: none  Cardiac: none  Respiratory:asthma~ exercise induced  Integumentary: none  Allergies: See allergy list  Hematologic:  none     Reviewed the pt labs with easton Goode to proceed with the procedure.

## 2024-11-06 NOTE — BRIEF OP NOTE
Brief Postoperative Note      Patient: Tico Day  YOB: 1960  MRN: [id number]    Date of Procedure: 11/6/2024  End stage renal disease    Post-Op Diagnosis: Same       * No procedures listed *    * No surgeons listed *    Assistant:  * No surgical staff found *    Anesthesia: * No anesthesia type entered *    Estimated Blood Loss (mL): Minimal    Complications: None    Specimens:   * No specimens in log *    Implants:  * No implants in log *      Drains:   Gastrostomy/Enterostomy/Jejunostomy Tube LUQ (Active)   Drainage Appearance None 11/06/24 1200   Site Description Clean, dry & intact 11/06/24 1200   J Port Status Clamped 11/06/24 1200   G Port Status Clamped 11/06/24 1200   Surrounding Skin Clean;Dry;Intact;Reddened 11/06/24 1200   Dressing Status Clean, dry & intact 11/06/24 1200   Dressing Type Open to air 11/06/24 1200   G-Tube Care Completed Yes 11/05/24 2000   Tube Feeding Renal Formula 11/05/24 2000   Tube feeding/verify rate (mL/hr) 0 mL/hr 11/06/24 0000   Tube Feeding Intake (mL) 156 ml 11/06/24 0000   Free Water/Flush (mL) 60 mL 11/06/24 0000   Output (mL) 0 ml 11/05/24 0745   Action Taken Feed set changed;Tubing changed 11/03/24 0500   Residual Volume (ml) 0 ml 11/04/24 2030       External Urinary Catheter (Active)   Site Assessment Clean,dry & intact 11/06/24 0800   Placement Replaced 11/04/24 0547   Catheter Care Catheter/Wick replaced 11/04/24 2200   Perineal Care Yes 11/04/24 2200   Suction 40 mmgHg continuous 11/04/24 2200   Urine Color Yellow 11/05/24 1712   Urine Appearance Hazy 11/05/24 1712   Urine Odor Other (Comment) 11/03/24 1600   Output (mL) 100 mL 11/06/24 0630       [REMOVED] External Urinary Catheter (Removed)   Site Assessment Pink;Red 11/02/24 1216   Placement Replaced 11/02/24 0800   Securement Method Tape 11/02/24 1216   Catheter Care Catheter/Wick replaced;Suction Canister/Tubing changed 11/02/24 0800   Perineal Care Yes 11/02/24 0800   Suction 40 mmgHg

## 2024-11-06 NOTE — PROGRESS NOTES
Nephrology Progress Note        [facility], [address] [place] [address]  Phone: [phone]  Office Hours: 8:30AM - 4:30PM  Monday - Friday 11/6/2024 6:30 AM  Subjective:   Admit Date: 10/25/2024  PCP: Carlos Nina MD  Interval History:   Awake  No leg edema  Soft BP    Diet: ADULT TUBE FEEDING; PEG; Renal Formula; Continuous; 15; Yes; 20; Q 4 hours; 55; 30; Q 4 hours      Data:   Scheduled Meds:   ipratropium  0.5 mg Nebulization 4x Daily    sodium chloride (Inhalant)  4 mL Nebulization BID    piperacillin-tazobactam  3,375 mg IntraVENous Q8H    epoetin gabriel-epbx  10,000 Units IntraVENous Once per day on Monday Wednesday Friday    miconazole   Topical BID    ALPRAZolam  0.5 mg PEG Tube Once    atorvastatin  40 mg Oral Nightly    gabapentin  100 mg Per G Tube Daily    lansoprazole  30 mg Oral QAM AC    traZODone  50 mg Oral Nightly    midodrine  10 mg Oral TID WC    [Held by provider] heparin (porcine)  5,000 Units SubCUTAneous 3 times per day    albuterol  2.5 mg Nebulization Q4H     Continuous Infusions:   dextrose 50 mL/hr at 11/05/24 1136    sodium chloride      dextrose       PRN Meds:loperamide, LORazepam **OR** LORazepam, sodium chloride, glucose, dextrose bolus **OR** dextrose bolus, glucagon (rDNA), dextrose, ipratropium 0.5 mg-albuterol 2.5 mg, ALPRAZolam, oxyCODONE, sodium chloride (Inhalant)  I/O last 3 completed shifts:  In: 1302.7 [NG/GT:717; IV Piggyback:85.7]  Out: 995 [Urine:495]  I/O this shift:  In: 116 [NG/GT:116]  Out: -     Intake/Output Summary (Last 24 hours) at 11/6/2024 0630  Last data filed at 11/5/2024 1944  Gross per 24 hour   Intake 116 ml   Output 250 ml   Net -134 ml       CBC:   Recent Labs     11/04/24  0646 11/05/24  0525 11/06/24  0526   WBC 4.0 3.7* 4.8   HGB 8.1* 8.4* 8.5*    121* 126*       BMP:    Recent Labs     11/05/24  0525 11/06/24  0445   * 130*   K 4.1 4.3   CL 98* 96*   CO2 30 27   BUN 18 20   CREATININE 2.7* 3.1*   GLUCOSE

## 2024-11-06 NOTE — PROGRESS NOTES
Infectious Disease Progress Note  2024   Patient Name: Tico Day : 1960   Impression  Pseudomonas aeruginosa Pneumonia:  Acute on Chronic Hypoxic Respiratory Failure Requiring Mechanical Ventilator to Chronic Trach:  Vascath Associated Line Infection S/p Removal 10/31/2024:    Continue IV Zosyn as difficult to say if ProBNP elevation in ESRD pt is causing the acute on chronic hypoxic resp failure or is it pneumonia but will treat for pneumonia. Infection from  right chest vascath s/p removal on 10/31 growing MDR Proteus mirabilis and Staphylococcus epidermidis   No reported allergies to ABX. CrCl 28 on ESRD. T-max 99.6. No leukocytosis. Pro-BNP 43,703. Viral resp panel and MRSA by PCR negative. CRP on dwt.   10/25-BC 0/2 NGTD  10/31-CVC removed, Culture: MRD Proteus mirabilis-3 colonies (sensi to cefepime, cefuroxime, ertapenem, meropenem, gentamicin and Zosyn) and Staphylococcus epidermidis (no sensi obtained).   10/26-Resp culture: Pseudomonas aeruginosa (pan sensi)   ESRD on HD (MWF):  Dr. Morales onboard  Decompensated HFrEF:  Multi-morbidity: per PMHx: ESRD on HD thrice weekly MWF, chronic HFrEF, chronic respiratory failure with tracheostomy since 2024, left atrial thrombus, wide-complex tachycardia, CAD s/p PCI , COPD, anemia of chronic disease, ALONDRA, anxiety and chronic pain with opioid dependence  Plan:  Continue IV Zosyn 3,375 mg tid, plan for a cumulative 14 day course (end date 2024) as will cover an 8 day course of therapy from removal of vascath as well as pneumonia.   Trend CRP and Pct, ordered  Reviewed Vascath cultured tip  growing P.mirabilis and S.epidermidis, Zosyn should cover both microbes   Following as showing improvement as remains on baseline oxygen, vascath has been removed, and is hemodynamically stable    Ongoing Antimicrobial Therapy  Zosyn 10/28- ?  Completed Antimicrobial Therapy  Meropenem 10/26-  Doxycycline 10/25  Cefepime 10/25  Vancomycin  10/25-26??  History:?Interval history noted. Chief complaint: Pseudomonas aeruginosa pneumonia.   Denies n/v/d/f or untoward effects of antibiotics  Physical Exam:  Vital Signs: /62   Pulse 64   Temp 97.7 °F (36.5 °C)   Resp 19   Ht 1.753 m (5' 9\")   Wt 97.3 kg (214 lb 8.1 oz)   SpO2 98%   BMI 31.68 kg/m²     Gen: resting quietly in bed, no new changes.    Skin: no stigmata of endocarditis  Wounds: C/D/I-removal site from tunneled CVC right chest.    Vascath removal site- with erythema at right upper chest.   HEMT: AT/NC Oropharynx pink, moist, and without lesions or exudates; dentition in good state of repair  Eyes: PERRLA, EOMI, conjunctiva pink, sclera anicteric.   Neck: Supple. Trachea midline. No LAD.  Chest: no distress and CTA. Diminished breath sounds, tracheostomy intact on mechanical ventilator.   Heart: NSR and no MRG.   Abd: soft, non-distended, no tenderness, no hepatomegaly. Normoactive bowel sounds.  PEG Tube: Intact LUQ   Ext: no clubbing, cyanosis, or edema  External Catheter Site: without erythema or tenderness draining clear yellow urine  Neuro: Mental status intact. CN 2-12 intact and no focal sensory or motor deficits     Radiologic / Imaging / TESTING  10/25/2024 XR Chest Portable:  IMPRESSION:  1. Moderate diffuse bilateral infiltrate versus edema.  2. Dialysis catheter and tracheostomy tube in good position.  3. Small right pleural effusion.     10/26/2024 CT Abdomen Pelvis WO Contrast:  IMPRESSION:  Extremely limited evaluation due to extensive artifact from poor arm positioning and right upper quadrant clips. If suspicion for gallbladder pathology, right upper quadrant ultrasound is recommended. Hepatic cirrhosis with sequelae including moderate ascites and splenomegaly. Diverticulosis.Small bilateral pleural effusions with adjacent consolidation/atelectasis. Right lung base subcentimeter nodular opacities, incompletely visualized. Consider short-term follow-up dedicated CT of

## 2024-11-06 NOTE — PROGRESS NOTES
Pulmonary and Critical Care  Progress Note    Subjective:   The patient is comfortable in bed.Tracheal aspirate:Pseudomonas,on Zosyn.  Shortness of breath none.  Chest pain none.  Addressing respiratory complaints Patient is negative for  hemoptysis and cyanosis  CONSTITUTIONAL:  negative for fevers and chills      Past Medical History:     has no past medical history on file.   has no past surgical history on file.   reports that he has quit smoking. His smoking use included cigarettes. He started smoking about 32 years ago. He has a 16.3 pack-year smoking history. He has never been exposed to tobacco smoke. He has never used smokeless tobacco. He reports that he does not drink alcohol.  Family history:  family history is not on file.    Allergies   Allergen Reactions    Reglan [Metoclopramide]     Remeron [Mirtazapine]      Social History:    Reviewed; no changes    Objective:   PHYSICAL EXAM:        VITALS:  BP (!) 88/54   Pulse 65   Temp 97.7 °F (36.5 °C)   Resp 19   Ht 1.753 m (5' 9\")   Wt 97.3 kg (214 lb 8.1 oz)   SpO2 96%   BMI 31.68 kg/m²     24HR INTAKE/OUTPUT:    Intake/Output Summary (Last 24 hours) at 11/6/2024 1040  Last data filed at 11/6/2024 0630  Gross per 24 hour   Intake 332 ml   Output 640 ml   Net -308 ml       CONSTITUTIONAL:  Awake.  LUNGS:  decreased breath sounds, occ basilar crackles.  CARDIOVASCULAR:  normal S1 and S2 and negative JVD  ABD:Abdomen soft, non-tender. BS normal. No masses,  No organomegaly  NEURO:Alert on vent.  DATA:    CBC:  Recent Labs     11/04/24  0646 11/05/24 0525 11/06/24  0526   WBC 4.0 3.7* 4.8   RBC 2.86* 2.95* 2.98*   HGB 8.1* 8.4* 8.5*   HCT 27.0* 28.1* 27.7*    121* 126*   MCV 94.4 95.3 93.0   MCH 28.3 28.5 28.5   MCHC 30.0* 29.9* 30.7*   RDW 17.5* 17.7* 17.6*      BMP:  Recent Labs     11/05/24 0525 11/06/24  0445   * 130*   K 4.1 4.3   CL 98* 96*   CO2 30 27   BUN 18 20   CREATININE 2.7* 3.1*   CALCIUM 11.8* 12.0*   GLUCOSE 73* 67*       ABG:  No results for input(s): \"PH\", \"PO2ART\", \"DIW2EWP\", \"HCO3\", \"BEART\", \"O2SAT\" in the last 72 hours.  Lab Results   Component Value Date    PROBNP 30,376 (H) 11/05/2024    PROBNP 43,703 (H) 10/25/2024     No results found for: \"CULTRESP\"    Radiology Review:  Pertinent images / reports were reviewed as a part of this visit.    Assessment:     Patient Active Problem List   Diagnosis    Acute on chronic respiratory failure    Moderate malnutrition (HCC)    Pneumonia of both lower lobes due to Pseudomonas species (HCC)    Central line infection    Acute on chronic hypoxic respiratory failure       Plan:   1. Overall the patient has improved  2. Wean per protocol.    Bashir Bragg MD   11/6/2024  10:40 AM

## 2024-11-06 NOTE — PROGRESS NOTES
11/05/24  6:04 PM   Result Value Ref Range    POC Glucose 71 (L) 74 - 99 mg/dL   POCT Glucose    Collection Time: 11/05/24  7:47 PM   Result Value Ref Range    POC Glucose 75 74 - 99 mg/dL   Blood Gas, Venous    Collection Time: 11/05/24  8:24 PM   Result Value Ref Range    pH, Jose 7.418 7.320 - 7.430    pCO2, Jose 46.7 38 - 54 mm Hg    PO2, Jose 54.3 (H) 23 - 48 mm Hg    HCO3, Venous 29.5 (H) 22 - 29 mmol/L    Positive Base Excess, Jose 4.3 (H) 0 - 3 mmol/L    Oxyhemoglobin 86.6 %    Carboxyhemoglobin 0.6 0.5 - 1.5 %    Methemoglobin 0.5 0.5 - 1.5 %    Pt Temp 37.0     Umang Test NOT APPLICABLE    Basic Metabolic Panel    Collection Time: 11/06/24  4:45 AM   Result Value Ref Range    Sodium 130 (L) 136 - 145 mmol/L    Potassium 4.3 3.5 - 5.1 mmol/L    Chloride 96 (L) 99 - 110 mmol/L    CO2 27 21 - 32 mmol/L    Anion Gap 7 (L) 9 - 17 mmol/L    Glucose 67 (L) 74 - 99 mg/dL    BUN 20 7 - 20 mg/dL    Creatinine 3.1 (H) 0.8 - 1.3 mg/dL    Est, Glom Filt Rate 20 (L) >60 mL/min/1.73m2    Calcium 12.0 (H) 8.3 - 10.6 mg/dL   Magnesium    Collection Time: 11/06/24  4:45 AM   Result Value Ref Range    Magnesium 1.9 1.8 - 2.4 mg/dL   Phosphorus    Collection Time: 11/06/24  4:45 AM   Result Value Ref Range    Phosphorus 4.4 2.5 - 4.9 mg/dL   POCT Glucose    Collection Time: 11/06/24  4:49 AM   Result Value Ref Range    POC Glucose 70 (L) 74 - 99 mg/dL   CBC with Diff    Collection Time: 11/06/24  5:26 AM   Result Value Ref Range    WBC 4.8 4.0 - 10.5 k/uL    RBC 2.98 (L) 4.60 - 6.20 m/uL    Hemoglobin 8.5 (L) 13.5 - 18.0 g/dL    Hematocrit 27.7 (L) 42.0 - 52.0 %    MCV 93.0 78.0 - 100.0 fL    MCH 28.5 27.0 - 31.0 pg    MCHC 30.7 (L) 32.0 - 36.0 g/dL    RDW 17.6 (H) 11.7 - 14.9 %    Platelets 126 (L) 140 - 440 k/uL    MPV 8.9 7.5 - 11.1 fL    Neutrophils % 60 36 - 66 %    Lymphocytes % 17 (L) 24 - 44 %    Monocytes % 9 (H) 0 - 4 %    Eosinophils % 12 (H) 0.0 - 3.0 %    Basophils % 1 0 - 1 %    Immature Granulocytes % 0 0 %

## 2024-11-06 NOTE — PROGRESS NOTES
TRANSFER - OUT REPORT:    Verbal report given to Gaurang SEE on Tico Day being transferred to 2122 for routine progression of patient care       Report consisted of patient's Situation, Background, Assessment and   Recommendations(SBAR).     Information from the following report(s) Nurse Handoff Report was reviewed with the receiving nurse.    Opportunity for questions and clarification was provided.      Patient transported with:   Registered Nurse

## 2024-11-06 NOTE — PRE SEDATION
Sedation Pre-Procedure Note    Patient Name: Tico Day   YOB: 1960  Room/Bed: 2122/2122-A  Medical Record Number: [id number]  Date: 11/6/2024   Time: 3:30 PM       Indication:  renal failure    Consent: I have discussed with the patient and/or the patient representative the indication, alternatives, and the possible risks and/or complications of the planned procedure and the anesthesia methods. The patient and/or patient representative appear to understand and agree to proceed.    Vital Signs:   Vitals:    11/06/24 1526   BP: 137/71   Pulse: 69   Resp: 19   Temp:    SpO2: 100%       Past Medical History:   has no past medical history on file.    Past Surgical History:   has no past surgical history on file.    Medications:   Scheduled Meds:    piperacillin-tazobactam  4,500 mg IntraVENous Q12H    ipratropium  0.5 mg Nebulization 4x Daily    sodium chloride (Inhalant)  4 mL Nebulization BID    epoetin gabriel-epbx  10,000 Units IntraVENous Once per day on Monday Wednesday Friday    miconazole   Topical BID    ALPRAZolam  0.5 mg PEG Tube Once    atorvastatin  40 mg Oral Nightly    gabapentin  100 mg Per G Tube Daily    lansoprazole  30 mg Oral QAM AC    traZODone  50 mg Oral Nightly    midodrine  10 mg Oral TID WC    [Held by provider] heparin (porcine)  5,000 Units SubCUTAneous 3 times per day    albuterol  2.5 mg Nebulization Q4H     Continuous Infusions:    dextrose 75 mL/hr at 11/06/24 0825    sodium chloride      dextrose       PRN Meds: loperamide, LORazepam **OR** LORazepam, sodium chloride, glucose, dextrose bolus **OR** dextrose bolus, glucagon (rDNA), dextrose, ipratropium 0.5 mg-albuterol 2.5 mg, ALPRAZolam, oxyCODONE, sodium chloride (Inhalant)  Home Meds:   Prior to Admission medications    Medication Sig Start Date End Date Taking? Authorizing Provider   albuterol (PROVENTIL) (2.5 MG/3ML) 0.083% nebulizer solution Inhale 3 mLs into the lungs every 4 hours 8/6/24  Yes Provider,

## 2024-11-07 LAB
GLUCOSE BLD-MCNC: 73 MG/DL (ref 74–99)
GLUCOSE BLD-MCNC: 82 MG/DL (ref 74–99)
GLUCOSE BLD-MCNC: 83 MG/DL (ref 74–99)
GLUCOSE BLD-MCNC: 83 MG/DL (ref 74–99)
GLUCOSE BLD-MCNC: 90 MG/DL (ref 74–99)

## 2024-11-07 PROCEDURE — 6360000002 HC RX W HCPCS: Performed by: STUDENT IN AN ORGANIZED HEALTH CARE EDUCATION/TRAINING PROGRAM

## 2024-11-07 PROCEDURE — 6360000002 HC RX W HCPCS: Performed by: INTERNAL MEDICINE

## 2024-11-07 PROCEDURE — 6370000000 HC RX 637 (ALT 250 FOR IP): Performed by: STUDENT IN AN ORGANIZED HEALTH CARE EDUCATION/TRAINING PROGRAM

## 2024-11-07 PROCEDURE — 89220 SPUTUM SPECIMEN COLLECTION: CPT

## 2024-11-07 PROCEDURE — 2580000003 HC RX 258: Performed by: INTERNAL MEDICINE

## 2024-11-07 PROCEDURE — 2700000000 HC OXYGEN THERAPY PER DAY

## 2024-11-07 PROCEDURE — 6370000000 HC RX 637 (ALT 250 FOR IP): Performed by: INTERNAL MEDICINE

## 2024-11-07 PROCEDURE — 2060000000 HC ICU INTERMEDIATE R&B

## 2024-11-07 PROCEDURE — 94761 N-INVAS EAR/PLS OXIMETRY MLT: CPT

## 2024-11-07 PROCEDURE — 82962 GLUCOSE BLOOD TEST: CPT

## 2024-11-07 PROCEDURE — 2580000003 HC RX 258: Performed by: NURSE PRACTITIONER

## 2024-11-07 PROCEDURE — 6360000002 HC RX W HCPCS: Performed by: NURSE PRACTITIONER

## 2024-11-07 PROCEDURE — 94003 VENT MGMT INPAT SUBQ DAY: CPT

## 2024-11-07 PROCEDURE — 94640 AIRWAY INHALATION TREATMENT: CPT

## 2024-11-07 RX ADMIN — ALPRAZOLAM 0.5 MG: 0.5 TABLET ORAL at 17:01

## 2024-11-07 RX ADMIN — OXYCODONE HYDROCHLORIDE 5 MG: 5 TABLET ORAL at 10:50

## 2024-11-07 RX ADMIN — ALBUTEROL SULFATE 2.5 MG: 2.5 SOLUTION RESPIRATORY (INHALATION) at 08:16

## 2024-11-07 RX ADMIN — PIPERACILLIN AND TAZOBACTAM 4500 MG: 4; .5 INJECTION, POWDER, FOR SOLUTION INTRAVENOUS at 06:32

## 2024-11-07 RX ADMIN — LORAZEPAM 1 MG: 1 TABLET ORAL at 20:40

## 2024-11-07 RX ADMIN — TRAZODONE HYDROCHLORIDE 50 MG: 50 TABLET ORAL at 20:37

## 2024-11-07 RX ADMIN — IPRATROPIUM BROMIDE 0.5 MG: 0.5 SOLUTION RESPIRATORY (INHALATION) at 08:17

## 2024-11-07 RX ADMIN — ALBUTEROL SULFATE 2.5 MG: 2.5 SOLUTION RESPIRATORY (INHALATION) at 13:01

## 2024-11-07 RX ADMIN — LOPERAMIDE HYDROCHLORIDE 2 MG: 2 CAPSULE ORAL at 04:53

## 2024-11-07 RX ADMIN — PIPERACILLIN AND TAZOBACTAM 4500 MG: 4; .5 INJECTION, POWDER, FOR SOLUTION INTRAVENOUS at 20:42

## 2024-11-07 RX ADMIN — GABAPENTIN 100 MG: 100 CAPSULE ORAL at 08:17

## 2024-11-07 RX ADMIN — LANSOPRAZOLE 30 MG: 30 TABLET, ORALLY DISINTEGRATING ORAL at 06:33

## 2024-11-07 RX ADMIN — Medication 4 ML: at 08:19

## 2024-11-07 RX ADMIN — ALBUTEROL SULFATE 2.5 MG: 2.5 SOLUTION RESPIRATORY (INHALATION) at 20:38

## 2024-11-07 RX ADMIN — ALBUTEROL SULFATE 2.5 MG: 2.5 SOLUTION RESPIRATORY (INHALATION) at 16:09

## 2024-11-07 RX ADMIN — IPRATROPIUM BROMIDE 0.5 MG: 0.5 SOLUTION RESPIRATORY (INHALATION) at 20:39

## 2024-11-07 RX ADMIN — ALPRAZOLAM 0.5 MG: 0.5 TABLET ORAL at 10:50

## 2024-11-07 RX ADMIN — Medication 4 ML: at 20:40

## 2024-11-07 RX ADMIN — ALPRAZOLAM 0.5 MG: 0.5 TABLET ORAL at 04:53

## 2024-11-07 RX ADMIN — OXYCODONE HYDROCHLORIDE 5 MG: 5 TABLET ORAL at 04:46

## 2024-11-07 RX ADMIN — OXYCODONE HYDROCHLORIDE 5 MG: 5 TABLET ORAL at 17:02

## 2024-11-07 RX ADMIN — ATORVASTATIN CALCIUM 40 MG: 40 TABLET, FILM COATED ORAL at 20:37

## 2024-11-07 RX ADMIN — HEPARIN SODIUM 5000 UNITS: 5000 INJECTION INTRAVENOUS; SUBCUTANEOUS at 14:12

## 2024-11-07 RX ADMIN — HEPARIN SODIUM 5000 UNITS: 5000 INJECTION INTRAVENOUS; SUBCUTANEOUS at 23:33

## 2024-11-07 RX ADMIN — IPRATROPIUM BROMIDE 0.5 MG: 0.5 SOLUTION RESPIRATORY (INHALATION) at 16:10

## 2024-11-07 RX ADMIN — ALPRAZOLAM 0.5 MG: 0.5 TABLET ORAL at 23:33

## 2024-11-07 RX ADMIN — ALBUTEROL SULFATE 2.5 MG: 2.5 SOLUTION RESPIRATORY (INHALATION) at 23:27

## 2024-11-07 RX ADMIN — MICONAZOLE NITRATE: 2 POWDER TOPICAL at 08:20

## 2024-11-07 RX ADMIN — IPRATROPIUM BROMIDE 0.5 MG: 0.5 SOLUTION RESPIRATORY (INHALATION) at 13:02

## 2024-11-07 RX ADMIN — OXYCODONE HYDROCHLORIDE 5 MG: 5 TABLET ORAL at 23:33

## 2024-11-07 RX ADMIN — LORAZEPAM 1 MG: 1 TABLET ORAL at 08:17

## 2024-11-07 RX ADMIN — ALBUTEROL SULFATE 2.5 MG: 2.5 SOLUTION RESPIRATORY (INHALATION) at 03:25

## 2024-11-07 ASSESSMENT — PULMONARY FUNCTION TESTS
PIF_VALUE: 16
PIF_VALUE: 15
PIF_VALUE: 16
PIF_VALUE: 15
PIF_VALUE: 15
PIF_VALUE: 16
PIF_VALUE: 15
PIF_VALUE: 15
PIF_VALUE: 16
PIF_VALUE: 15
PIF_VALUE: 15
PIF_VALUE: 16
PIF_VALUE: 15
PIF_VALUE: 16

## 2024-11-07 ASSESSMENT — PAIN SCALES - GENERAL
PAINLEVEL_OUTOF10: 9
PAINLEVEL_OUTOF10: 9
PAINLEVEL_OUTOF10: 6
PAINLEVEL_OUTOF10: 8

## 2024-11-07 ASSESSMENT — PAIN DESCRIPTION - LOCATION
LOCATION: BACK
LOCATION: BACK
LOCATION: NECK

## 2024-11-07 NOTE — PROGRESS NOTES
V2.0  [facility] Hospitalist Progress Note      Name:  Tico Day /Age/Sex: 1960  (64 y.o. male)   MRN & CSN:  [id number] & [id number] Encounter Date/Time: 2024 10:55 AM EDT    Location:  -A PCP: Carlos Nina MD       Hospital Day: 14    Assessment and Plan:   Tico Day is a 64 y.o. male with pmh of  ESRD on HD , chronic systolic heart failure, chronic respiratory failure with tracheostomy since , left atrial thrombus, wide-complex tachycardia, CAD with history of PCI in , COPD, anemia of chronic disease, obstructive sleep apnea, anxiety, chronic pain with opioid dependence  who presents with Acute on chronic respiratory failure      Plan:  Tracheostomy dependence  Acute on chronic respiratory failure with hypoxia requiring ventilatory support  Secondary to acute on chronic systolic heart failure, possible pneumonia  Chest x-ray personally reviewed bilateral pulmonary infiltrates R>L and cardiomegaly Pro-BNP 43,703.  Last echocardiogram from 2024 reviewed EF of 40%.  Negative respiratory viral panel  monitor intake and output measure daily weight  Nephrology consultation for inpatient management of HD and volume management  Sputum culture growing Pseudomonas aeruginosa sensitive to Zosyn switched from meropenem to Zosyn; ID team consulted - plan for 2 weeks total coverage, continue zosyn - end date 2024.  Needs peripheral IV to deliver these antibiotics and cannot be done after dialysis at nursing home tomorrow.  Plan for discharge after last dose tomorrow.  Consulted pulmonology for vent management wean as per protocol as ordered by them.    Infected TDC catheter, proteus mirabilis  Painful, tender, erythematous central line   ID team onboard  IR team removed TDC and tip sent for culture - growing Proteus and Staph epi  Blood cultures have been negative  TDC placed on .     ESRD on HD   Nephrology consultation

## 2024-11-07 NOTE — PROGRESS NOTES
Nephrology Progress Note        [address] [facility], [address] [place] [address]  Phone: [phone]  Office Hours: 8:30AM - 4:30PM  Monday - Friday 11/7/2024 6:48 AM  Subjective:   Admit Date: 10/25/2024  PCP: Carlos Nina MD  Interval History:     Diet: ADULT TUBE FEEDING; PEG; Renal Formula; Continuous; 15; Yes; 20; Q 4 hours; 55; 30; Q 4 hours  ADULT DIET; Regular      Data:   Scheduled Meds:   piperacillin-tazobactam  4,500 mg IntraVENous Q12H    ipratropium  0.5 mg Nebulization 4x Daily    sodium chloride (Inhalant)  4 mL Nebulization BID    epoetin gabriel-epbx  10,000 Units IntraVENous Once per day on Monday Wednesday Friday    miconazole   Topical BID    ALPRAZolam  0.5 mg PEG Tube Once    atorvastatin  40 mg Oral Nightly    gabapentin  100 mg Per G Tube Daily    lansoprazole  30 mg Oral QAM AC    traZODone  50 mg Oral Nightly    midodrine  10 mg Oral TID WC    [Held by provider] heparin (porcine)  5,000 Units SubCUTAneous 3 times per day    albuterol  2.5 mg Nebulization Q4H     Continuous Infusions:   sodium chloride      dextrose       PRN Meds:loperamide, LORazepam **OR** LORazepam, sodium chloride, glucose, dextrose bolus **OR** dextrose bolus, glucagon (rDNA), dextrose, ipratropium 0.5 mg-albuterol 2.5 mg, ALPRAZolam, oxyCODONE, sodium chloride (Inhalant)  I/O last 3 completed shifts:  In: 832 [NG/GT:332]  Out: 1140 [Urine:640]  I/O this shift:  In: 97.2 [IV Piggyback:97.2]  Out: 1050 [Urine:1050]    Intake/Output Summary (Last 24 hours) at 11/7/2024 0648  Last data filed at 11/7/2024 0632  Gross per 24 hour   Intake 597.24 ml   Output 1550 ml   Net -952.76 ml       CBC:   Recent Labs     11/05/24  0525 11/06/24  0526   WBC 3.7* 4.8   HGB 8.4* 8.5*   * 126*       BMP:    Recent Labs     11/05/24  0525 11/06/24  0445   * 130*   K 4.1 4.3   CL 98* 96*   CO2 30 27   BUN 18 20   CREATININE 2.7* 3.1*   GLUCOSE 73* 67*   CALCIUM 11.8* 12.0*     Hepatic:   Recent Labs      11/05/24  0525   AST 56*   ALT 21   BILITOT 0.4   ALKPHOS 210*         Objective:   Vitals: /67   Pulse 71   Temp 98.4 °F (36.9 °C) (Oral)   Resp 22   Ht 1.753 m (5' 9\")   Wt 97.3 kg (214 lb 8.1 oz)   SpO2 98%   BMI 31.68 kg/m²   General appearance:  in no acute distress  HEENT: normocephalic, atraumatic,   Neck: supple, trachea midline  Lungs:  breathing comfortably on mv  Heart:: regular rate and rhythm,  Abdomen: peg tube present  Extremities: extremities atraumatic, no cyanosis or edema    MEDICAL DECISION MAKING     Patient Active Problem List    Diagnosis Date Noted    Pneumonia of both lower lobes due to Pseudomonas species (HCC) 10/30/2024    Central line infection 10/30/2024    Acute on chronic hypoxic respiratory failure 10/30/2024    Moderate malnutrition (HCC) 10/27/2024    Acute on chronic respiratory failure 10/26/2024     He now has a new left tunnelled HD cath  HD planned tomorrow  Continue retacrit in HD  Avoid calcium tabs                  Electronically signed by Mike Hoskins DO on 11/7/2024 at 6:48 AM    [facility]  MD KAYLEE ARRINGTON DO  [address] [facility],  [address] [place] [address]  PHONE: [phone]  FAX: [phone]

## 2024-11-07 NOTE — CARE COORDINATION
Cm continues to follow for anticipate return to [facility] at discharge where he receives vent care and HD.

## 2024-11-07 NOTE — PROGRESS NOTES
Pulmonary and Critical Care  Progress Note    Subjective:   The patient is comfortable in bed.On vent.Minimal support.  Shortness of breath none.  Chest pain none.  Addressing respiratory complaints Patient is negative for  hemoptysis and cyanosis  CONSTITUTIONAL:  negative for fevers and chills      Past Medical History:     has no past medical history on file.   has a past surgical history that includes IR TUNNELED CVC PLACE WO SQ PORT/PUMP > 5 YEARS (11/6/2024).   reports that he has quit smoking. His smoking use included cigarettes. He started smoking about 32 years ago. He has a 16.3 pack-year smoking history. He has never been exposed to tobacco smoke. He has never used smokeless tobacco. He reports that he does not drink alcohol.  Family history:  family history is not on file.    Allergies   Allergen Reactions    Reglan [Metoclopramide]     Remeron [Mirtazapine]      Social History:    Reviewed; no changes    Objective:   PHYSICAL EXAM:        VITALS:  /68   Pulse 82   Temp 97.8 °F (36.6 °C) (Oral)   Resp (!) 33   Ht 1.753 m (5' 9\")   Wt 97.9 kg (215 lb 13.3 oz)   SpO2 97%   BMI 31.87 kg/m²     24HR INTAKE/OUTPUT:    Intake/Output Summary (Last 24 hours) at 11/7/2024 1040  Last data filed at 11/7/2024 0632  Gross per 24 hour   Intake 597.24 ml   Output 1550 ml   Net -952.76 ml       CONSTITUTIONAL:  Awake.  LUNGS:  decreased breath sounds, occ basilar crackles.  CARDIOVASCULAR:  normal S1 and S2 and negative JVD  ABD:Abdomen soft, non-tender. BS normal. No masses,  No organomegaly  NEURO:Alert on vent.  DATA:    CBC:  Recent Labs     11/05/24 0525 11/06/24  0526   WBC 3.7* 4.8   RBC 2.95* 2.98*   HGB 8.4* 8.5*   HCT 28.1* 27.7*   * 126*   MCV 95.3 93.0   MCH 28.5 28.5   MCHC 29.9* 30.7*   RDW 17.7* 17.6*      BMP:  Recent Labs     11/05/24  0525 11/06/24  0445   * 130*   K 4.1 4.3   CL 98* 96*   CO2 30 27   BUN 18 20   CREATININE 2.7* 3.1*   CALCIUM 11.8* 12.0*   GLUCOSE 73* 67*

## 2024-11-07 NOTE — PLAN OF CARE
Problem: Safety - Adult  Goal: Free from fall injury  11/7/2024 0253 by Paz Moore RN  Outcome: Progressing  11/6/2024 1433 by Johanne Hicks RN  Outcome: Progressing     Problem: Discharge Planning  Goal: Discharge to home or other facility with appropriate resources  Outcome: Progressing     Problem: Skin/Tissue Integrity  Goal: Absence of new skin breakdown  Description: 1.  Monitor for areas of redness and/or skin breakdown  2.  Assess vascular access sites hourly  3.  Every 4-6 hours minimum:  Change oxygen saturation probe site  4.  Every 4-6 hours:  If on nasal continuous positive airway pressure, respiratory therapy assess nares and determine need for appliance change or resting period.  11/7/2024 0253 by Paz Moore RN  Outcome: Progressing  11/6/2024 1433 by Johanne Hicks RN  Outcome: Progressing     Problem: Pain  Goal: Verbalizes/displays adequate comfort level or baseline comfort level  Outcome: Progressing     Problem: Nutrition Deficit:  Goal: Optimize nutritional status  Outcome: Progressing     Problem: Infection - Adult  Goal: Absence of infection at discharge  Recent Flowsheet Documentation  Taken 11/6/2024 2000 by Paz Moore RN  Absence of infection at discharge: Monitor lab/diagnostic results  Goal: Absence of infection during hospitalization  Recent Flowsheet Documentation  Taken 11/6/2024 2000 by Paz Moore RN  Absence of infection during hospitalization: Monitor all insertion sites i.e., indwelling lines, tubes and drains     Problem: Metabolic/Fluid and Electrolytes - Adult  Goal: Electrolytes maintained within normal limits  Recent Flowsheet Documentation  Taken 11/6/2024 2000 by Paz Moore RN  Electrolytes maintained within normal limits: Monitor labs and assess patient for signs and symptoms of electrolyte imbalances  Goal: Hemodynamic stability and optimal renal function maintained  Recent Flowsheet Documentation  Taken 11/6/2024 2000 by Paz Moore RN  Hemodynamic

## 2024-11-07 NOTE — PLAN OF CARE
Problem: Safety - Adult  Goal: Free from fall injury  11/7/2024 1546 by Mery Dominguez RN  Outcome: Progressing  11/7/2024 0253 by Paz Moore RN  Outcome: Progressing     Problem: Discharge Planning  Goal: Discharge to home or other facility with appropriate resources  11/7/2024 1546 by Mery Dominguez RN  Outcome: Progressing  11/7/2024 0253 by Paz Moore RN  Outcome: Progressing     Problem: Skin/Tissue Integrity  Goal: Absence of new skin breakdown  Description: 1.  Monitor for areas of redness and/or skin breakdown  2.  Assess vascular access sites hourly  3.  Every 4-6 hours minimum:  Change oxygen saturation probe site  4.  Every 4-6 hours:  If on nasal continuous positive airway pressure, respiratory therapy assess nares and determine need for appliance change or resting period.  11/7/2024 1546 by Mery Dominguez RN  Outcome: Progressing  11/7/2024 0253 by Paz Moore RN  Outcome: Progressing     Problem: Pain  Goal: Verbalizes/displays adequate comfort level or baseline comfort level  11/7/2024 1546 by Mery Dominguez RN  Outcome: Progressing  11/7/2024 0253 by Paz Moore RN  Outcome: Progressing     Problem: Nutrition Deficit:  Goal: Optimize nutritional status  11/7/2024 1546 by Mery Dominguez RN  Outcome: Progressing  11/7/2024 0253 by Paz Moore RN  Outcome: Progressing     Problem: Neurosensory - Adult  Goal: Achieves maximal functionality and self care  Outcome: Progressing     Problem: Respiratory - Adult  Goal: Achieves optimal ventilation and oxygenation  Outcome: Progressing     Problem: Cardiovascular - Adult  Goal: Maintains optimal cardiac output and hemodynamic stability  Outcome: Progressing     Problem: Skin/Tissue Integrity - Adult  Goal: Skin integrity remains intact  Outcome: Progressing  Flowsheets (Taken 11/7/2024 1200)  Skin Integrity Remains Intact: Assess vascular access sites hourly     Problem: Musculoskeletal - Adult  Goal: Return mobility to safest level of  function  Outcome: Progressing  Flowsheets (Taken 11/7/2024 1200)  Return Mobility to Safest Level of Function: Assess patient stability and activity tolerance for standing, transferring and ambulating with or without assistive devices     Problem: Gastrointestinal - Adult  Goal: Maintains or returns to baseline bowel function  Outcome: Progressing  Goal: Maintains adequate nutritional intake  Outcome: Progressing     Problem: Genitourinary - Adult  Goal: Absence of urinary retention  Outcome: Progressing     Problem: Infection - Adult  Goal: Absence of infection at discharge  Outcome: Progressing  Goal: Absence of infection during hospitalization  Outcome: Progressing     Problem: Metabolic/Fluid and Electrolytes - Adult  Goal: Electrolytes maintained within normal limits  Outcome: Progressing  Goal: Hemodynamic stability and optimal renal function maintained  Outcome: Progressing  Goal: Glucose maintained within prescribed range  Outcome: Progressing     Problem: Hematologic - Adult  Goal: Maintains hematologic stability  Outcome: Progressing

## 2024-11-07 NOTE — PLAN OF CARE
Problem: Safety - Adult  Goal: Free from fall injury  11/7/2024 1604 by Dwayne Rosas  Outcome: Progressing  11/7/2024 1546 by Mery Dominguez RN  Outcome: Progressing  11/7/2024 0253 by Paz Moore RN  Outcome: Progressing     Problem: Discharge Planning  Goal: Discharge to home or other facility with appropriate resources  11/7/2024 1604 by Dwayne Rosas  Outcome: Progressing  11/7/2024 1546 by Mery Dominguez RN  Outcome: Progressing  11/7/2024 0253 by Paz Moore RN  Outcome: Progressing     Problem: Skin/Tissue Integrity  Goal: Absence of new skin breakdown  Description: 1.  Monitor for areas of redness and/or skin breakdown  2.  Assess vascular access sites hourly  3.  Every 4-6 hours minimum:  Change oxygen saturation probe site  4.  Every 4-6 hours:  If on nasal continuous positive airway pressure, respiratory therapy assess nares and determine need for appliance change or resting period.  11/7/2024 1604 by Dwayne Rosas  Outcome: Progressing  11/7/2024 1546 by Mery Dominguez, RN  Outcome: Progressing  11/7/2024 0253 by Paz Moore RN  Outcome: Progressing     Problem: Pain  Goal: Verbalizes/displays adequate comfort level or baseline comfort level  11/7/2024 1604 by Dwayne Rosas  Outcome: Progressing  11/7/2024 1546 by Mery Dominguez, RN  Outcome: Progressing  11/7/2024 0253 by Paz Moore RN  Outcome: Progressing     Problem: Nutrition Deficit:  Goal: Optimize nutritional status  11/7/2024 1604 by Dwayne Rosas  Outcome: Progressing  11/7/2024 1546 by Mery Dominguez, RN  Outcome: Progressing  11/7/2024 0253 by Paz Moore RN  Outcome: Progressing     Problem: Neurosensory - Adult  Goal: Achieves maximal functionality and self care  11/7/2024 1604 by Dwayne Rosas  Outcome: Progressing  11/7/2024 1546 by Mery Dominguez, RN  Outcome: Progressing     Problem: Respiratory - Adult  Goal: Achieves optimal ventilation and oxygenation  11/7/2024 1604 by Dwayne Rosas  Outcome:  Mery Dominguez, RN  Outcome: Progressing     Problem: Metabolic/Fluid and Electrolytes - Adult  Goal: Electrolytes maintained within normal limits  11/7/2024 1604 by Dwayne Rosas  Outcome: Progressing  11/7/2024 1546 by Mery Dominguez RN  Outcome: Progressing     Problem: Metabolic/Fluid and Electrolytes - Adult  Goal: Hemodynamic stability and optimal renal function maintained  11/7/2024 1604 by Dwayne Rosas  Outcome: Progressing  11/7/2024 1546 by Mery Dominguez RN  Outcome: Progressing     Problem: Metabolic/Fluid and Electrolytes - Adult  Goal: Glucose maintained within prescribed range  11/7/2024 1604 by Dwayne Rosas  Outcome: Progressing  11/7/2024 1546 by Mery Dominguez RN  Outcome: Progressing     Problem: Hematologic - Adult  Goal: Maintains hematologic stability  11/7/2024 1604 by Dwayne Rosas  Outcome: Progressing  11/7/2024 1546 by Mery Dominguez, RN  Outcome: Progressing

## 2024-11-07 NOTE — PROGRESS NOTES
Infectious Disease Progress Note  2024   Patient Name: Tico Day : 1960   Impression  Pseudomonas aeruginosa Pneumonia:  Acute on Chronic Hypoxic Respiratory Failure Requiring Mechanical Ventilator to Chronic Trach:  Vascath Associated Line Infection S/p Removal 10/31/2024:    Continue IV Zosyn as difficult to say if ProBNP elevation in ESRD pt is causing the acute on chronic hypoxic resp failure or is it pneumonia but will treat for pneumonia. Infection from  right chest vascath s/p removal on 10/31 growing MDR Proteus mirabilis and Staphylococcus epidermidis   No reported allergies to ABX. CrCl 28 on ESRD. T-max 99.6. No leukocytosis. Pro-BNP 43,703. Viral resp panel and MRSA by PCR negative. CRP on dwt.   10/25-BC 0/2 NGTD  10/31-CVC removed, Culture: MRD Proteus mirabilis-3 colonies (sensi to cefepime, cefuroxime, ertapenem, meropenem, gentamicin and Zosyn) and Staphylococcus epidermidis (no sensi obtained).   10/26-Resp culture: Pseudomonas aeruginosa (pan sensi)   ESRD on HD (MWF):  Dr. Morales onboard  Decompensated HFrEF:  Multi-morbidity: per PMHx: ESRD on HD thrice weekly MWF, chronic HFrEF, chronic respiratory failure with tracheostomy since 2024, left atrial thrombus, wide-complex tachycardia, CAD s/p PCI , COPD, anemia of chronic disease, ALONDRA, anxiety and chronic pain with opioid dependence  Plan:  Continue IV Zosyn 4,500 mg q12h, plan for a cumulative 14 day course (end date 2024) as will cover an 8 day course of therapy from removal of vascath as well as pneumonia.   Trend CRP and Pct, ordered  Reviewed Vascath cultured tip  growing P.mirabilis and S.epidermidis, Zosyn should cover both microbes   Follow up with PCP after DC  Will sign off and not follow the patient actively, please reconsult as needed.     Ongoing Antimicrobial Therapy  Zosyn 10/28- ?  Completed Antimicrobial Therapy  Meropenem 10/26-  Doxycycline 10/25  Cefepime 10/25  Vancomycin

## 2024-11-07 NOTE — PROGRESS NOTES
11/07/24 0822   Patient Observation   Pulse 76   Respirations 28   SpO2 99 %   Vent Information   Vent Mode CPAP/PS   $Ventilation $Subsequent Day   Ventilator Settings   FiO2  30 %   PEEP/CPAP (cmH2O) 5   Pressure Support (cm H2O) 10 cm H2O   Vent Patient Data (Readings)   Vt (Measured) 339 mL   Peak Inspiratory Pressure (cmH2O) 16 cmH2O   Rate Measured 28 br/min   Minute Volume (L/min) 11.1 Liters   Mean Airway Pressure (cmH2O) 8.3 cmH20   Plateau Pressure (cm H2O) 18 cm H2O   Driving Pressure 13   I:E Ratio 1:2.20   Flow Sensitivity 3 L/min   Backup Apnea On   Backup Rate 16 Breaths Per Minute   Backup Vt 450   Vent Alarm Settings   High Pressure (cmH2O) 45 cmH2O   Low Minute Volume (lpm) 2.5 L/min   High Minute Volume (lpm) 20 L/min   Low Exhaled Vt (ml) 250 mL   High Exhaled Vt (ml) 1000 mL   RR High (bpm) 40 br/min   Apnea (secs) 20 secs   Additional Respiratoray Assessments   Humidification Source HME   Ambu Bag With Mask At Bedside Yes   Surgical Airway  10/29/24   Placement Date/Time: 10/29/24 0700   Present on Admission/Arrival: Yes  Surgical Airway Type: Tracheostomy  Size: 6   Status Secured   Ties Assessment Secure;Intact   Spare Trach at Bedside Yes   Spare Trach Tube One Size Smaller at Bedside Yes   Ambu Bag With Mask at Bedside Yes

## 2024-11-08 VITALS
RESPIRATION RATE: 24 BRPM | HEART RATE: 80 BPM | DIASTOLIC BLOOD PRESSURE: 71 MMHG | WEIGHT: 215.83 LBS | OXYGEN SATURATION: 100 % | HEIGHT: 69 IN | TEMPERATURE: 98.2 F | BODY MASS INDEX: 31.97 KG/M2 | SYSTOLIC BLOOD PRESSURE: 129 MMHG

## 2024-11-08 PROCEDURE — 94003 VENT MGMT INPAT SUBQ DAY: CPT

## 2024-11-08 PROCEDURE — 89220 SPUTUM SPECIMEN COLLECTION: CPT

## 2024-11-08 PROCEDURE — 2700000000 HC OXYGEN THERAPY PER DAY

## 2024-11-08 PROCEDURE — 6370000000 HC RX 637 (ALT 250 FOR IP): Performed by: STUDENT IN AN ORGANIZED HEALTH CARE EDUCATION/TRAINING PROGRAM

## 2024-11-08 PROCEDURE — 6360000002 HC RX W HCPCS: Performed by: STUDENT IN AN ORGANIZED HEALTH CARE EDUCATION/TRAINING PROGRAM

## 2024-11-08 PROCEDURE — 6360000002 HC RX W HCPCS: Performed by: NURSE PRACTITIONER

## 2024-11-08 PROCEDURE — 2580000003 HC RX 258: Performed by: NURSE PRACTITIONER

## 2024-11-08 PROCEDURE — 6360000002 HC RX W HCPCS: Performed by: INTERNAL MEDICINE

## 2024-11-08 PROCEDURE — 6370000000 HC RX 637 (ALT 250 FOR IP): Performed by: INTERNAL MEDICINE

## 2024-11-08 PROCEDURE — 94640 AIRWAY INHALATION TREATMENT: CPT

## 2024-11-08 PROCEDURE — 90935 HEMODIALYSIS ONE EVALUATION: CPT

## 2024-11-08 PROCEDURE — 2580000003 HC RX 258: Performed by: STUDENT IN AN ORGANIZED HEALTH CARE EDUCATION/TRAINING PROGRAM

## 2024-11-08 PROCEDURE — 94761 N-INVAS EAR/PLS OXIMETRY MLT: CPT

## 2024-11-08 RX ADMIN — ALBUTEROL SULFATE 2.5 MG: 2.5 SOLUTION RESPIRATORY (INHALATION) at 08:11

## 2024-11-08 RX ADMIN — LOPERAMIDE HYDROCHLORIDE 2 MG: 2 CAPSULE ORAL at 08:22

## 2024-11-08 RX ADMIN — LORAZEPAM 1 MG: 1 TABLET ORAL at 10:15

## 2024-11-08 RX ADMIN — IPRATROPIUM BROMIDE 0.5 MG: 0.5 SOLUTION RESPIRATORY (INHALATION) at 11:32

## 2024-11-08 RX ADMIN — PIPERACILLIN AND TAZOBACTAM 4500 MG: 4; .5 INJECTION, POWDER, FOR SOLUTION INTRAVENOUS at 13:04

## 2024-11-08 RX ADMIN — IPRATROPIUM BROMIDE 0.5 MG: 0.5 SOLUTION RESPIRATORY (INHALATION) at 16:49

## 2024-11-08 RX ADMIN — OXYCODONE HYDROCHLORIDE 5 MG: 5 TABLET ORAL at 06:08

## 2024-11-08 RX ADMIN — HEPARIN SODIUM 5000 UNITS: 5000 INJECTION INTRAVENOUS; SUBCUTANEOUS at 16:07

## 2024-11-08 RX ADMIN — MICONAZOLE NITRATE: 2 POWDER TOPICAL at 08:12

## 2024-11-08 RX ADMIN — ALBUTEROL SULFATE 2.5 MG: 2.5 SOLUTION RESPIRATORY (INHALATION) at 16:50

## 2024-11-08 RX ADMIN — PIPERACILLIN AND TAZOBACTAM 4500 MG: 4; .5 INJECTION, POWDER, FOR SOLUTION INTRAVENOUS at 06:13

## 2024-11-08 RX ADMIN — ALBUTEROL SULFATE 2.5 MG: 2.5 SOLUTION RESPIRATORY (INHALATION) at 11:31

## 2024-11-08 RX ADMIN — OXYCODONE HYDROCHLORIDE 5 MG: 5 TABLET ORAL at 17:05

## 2024-11-08 RX ADMIN — EPOETIN ALFA-EPBX 10000 UNITS: 10000 INJECTION, SOLUTION INTRAVENOUS; SUBCUTANEOUS at 14:36

## 2024-11-08 RX ADMIN — ALPRAZOLAM 0.5 MG: 0.5 TABLET ORAL at 17:05

## 2024-11-08 RX ADMIN — LANSOPRAZOLE 30 MG: 30 TABLET, ORALLY DISINTEGRATING ORAL at 08:11

## 2024-11-08 RX ADMIN — ALPRAZOLAM 0.5 MG: 0.5 TABLET ORAL at 06:08

## 2024-11-08 RX ADMIN — MIDODRINE HYDROCHLORIDE 10 MG: 5 TABLET ORAL at 13:03

## 2024-11-08 RX ADMIN — MIDODRINE HYDROCHLORIDE 10 MG: 5 TABLET ORAL at 17:05

## 2024-11-08 RX ADMIN — GABAPENTIN 100 MG: 100 CAPSULE ORAL at 08:11

## 2024-11-08 RX ADMIN — HEPARIN SODIUM 5000 UNITS: 5000 INJECTION INTRAVENOUS; SUBCUTANEOUS at 06:13

## 2024-11-08 RX ADMIN — ALBUTEROL SULFATE 2.5 MG: 2.5 SOLUTION RESPIRATORY (INHALATION) at 04:33

## 2024-11-08 ASSESSMENT — PULMONARY FUNCTION TESTS
PIF_VALUE: 16
PIF_VALUE: 15
PIF_VALUE: 15
PIF_VALUE: 16
PIF_VALUE: 16
PIF_VALUE: 15
PIF_VALUE: 16
PIF_VALUE: 15
PIF_VALUE: 16

## 2024-11-08 ASSESSMENT — PAIN DESCRIPTION - LOCATION: LOCATION: OTHER (COMMENT)

## 2024-11-08 ASSESSMENT — PAIN SCALES - GENERAL: PAINLEVEL_OUTOF10: 9

## 2024-11-08 NOTE — PROGRESS NOTES
Nephrology Progress Note        [address] [facility], [address] [place] [address]  Phone: [phone]  Office Hours: 8:30AM - 4:30PM  Monday - Friday 11/8/2024 6:49 AM  Subjective:   Admit Date: 10/25/2024  PCP: Carlos Nina MD  Interval History: awake  Watching tv    Diet: ADULT TUBE FEEDING; PEG; Renal Formula; Continuous; 15; Yes; 20; Q 4 hours; 55; 30; Q 4 hours  ADULT DIET; Regular      Data:   Scheduled Meds:   piperacillin-tazobactam  4,500 mg IntraVENous Q12H    ipratropium  0.5 mg Nebulization 4x Daily    sodium chloride (Inhalant)  4 mL Nebulization BID    epoetin gabriel-epbx  10,000 Units IntraVENous Once per day on Monday Wednesday Friday    miconazole   Topical BID    ALPRAZolam  0.5 mg PEG Tube Once    atorvastatin  40 mg Oral Nightly    gabapentin  100 mg Per G Tube Daily    lansoprazole  30 mg Oral QAM AC    traZODone  50 mg Oral Nightly    midodrine  10 mg Oral TID WC    heparin (porcine)  5,000 Units SubCUTAneous 3 times per day    albuterol  2.5 mg Nebulization Q4H     Continuous Infusions:   sodium chloride      dextrose       PRN Meds:loperamide, LORazepam **OR** LORazepam, sodium chloride, glucose, dextrose bolus **OR** dextrose bolus, glucagon (rDNA), dextrose, ipratropium 0.5 mg-albuterol 2.5 mg, ALPRAZolam, oxyCODONE, sodium chloride (Inhalant)  I/O last 3 completed shifts:  In: 597.2 [IV Piggyback:97.2]  Out: 1900 [Urine:1400]  I/O this shift:  In: 2202.8 [NG/GT:2048; IV Piggyback:154.8]  Out: 400 [Urine:400]    Intake/Output Summary (Last 24 hours) at 11/8/2024 0649  Last data filed at 11/8/2024 0614  Gross per 24 hour   Intake 2202.79 ml   Output 750 ml   Net 1452.79 ml       CBC:   Recent Labs     11/06/24  0526   WBC 4.8   HGB 8.5*   *       BMP:    Recent Labs     11/06/24  0445   *   K 4.3   CL 96*   CO2 27   BUN 20   CREATININE 3.1*   GLUCOSE 67*   CALCIUM 12.0*         Objective:   Vitals: /68   Pulse 76   Temp 97.7 °F (36.5 °C) (Oral)   Resp

## 2024-11-08 NOTE — CARE COORDINATION
Dr ELICIA Phoenix advised CM of plan to discharge pt today after last dose of IVAB. Physician requests transport be arranged between [id number]. Cm contacted [facility] and they will transport Pt to  [facility] with  at 1830. CM contacted [facility] admissions to advise of discharge today and time of transport. Cm also updated [facility] re; plan for pt return to SNF today.    1400 Cm spoke with pt's wife re; pt discharge and planned time of transfer for pt return to [facility].

## 2024-11-08 NOTE — PROGRESS NOTES
Pulmonary and Critical Care  Progress Note    Subjective:   The patient is about the same.On vent.Minimal support.  Shortness of breath none.  Chest pain none.  Addressing respiratory complaints Patient is negative for  hemoptysis and cyanosis  CONSTITUTIONAL:  negative for fevers and chills      Past Medical History:     has no past medical history on file.   has a past surgical history that includes IR TUNNELED CVC PLACE WO SQ PORT/PUMP > 5 YEARS (11/6/2024).   reports that he has quit smoking. His smoking use included cigarettes. He started smoking about 32 years ago. He has a 16.3 pack-year smoking history. He has never been exposed to tobacco smoke. He has never used smokeless tobacco. He reports that he does not drink alcohol.  Family history:  family history is not on file.    Allergies   Allergen Reactions    Reglan [Metoclopramide]     Remeron [Mirtazapine]      Social History:    Reviewed; no changes    Objective:   PHYSICAL EXAM:        VITALS:  /64   Pulse 75   Temp 98.3 °F (36.8 °C) (Oral)   Resp 21   Ht 1.753 m (5' 9\")   Wt 97.9 kg (215 lb 13.3 oz)   SpO2 96%   BMI 31.87 kg/m²     24HR INTAKE/OUTPUT:    Intake/Output Summary (Last 24 hours) at 11/8/2024 1118  Last data filed at 11/8/2024 0614  Gross per 24 hour   Intake 2202.79 ml   Output 750 ml   Net 1452.79 ml       CONSTITUTIONAL:  Awake.  LUNGS:  decreased breath sounds, occ basilar crackles.  CARDIOVASCULAR:  normal S1 and S2 and negative JVD  ABD:Abdomen soft, non-tender. BS normal. No masses,  No organomegaly  NEURO:Alert on vent.  DATA:    CBC:  Recent Labs     11/06/24  0526   WBC 4.8   RBC 2.98*   HGB 8.5*   HCT 27.7*   *   MCV 93.0   MCH 28.5   MCHC 30.7*   RDW 17.6*      BMP:  Recent Labs     11/06/24  0445   *   K 4.3   CL 96*   CO2 27   BUN 20   CREATININE 3.1*   CALCIUM 12.0*   GLUCOSE 67*      ABG:  No results for input(s): \"PH\", \"PO2ART\", \"QLH0DAD\", \"HCO3\", \"BEART\", \"O2SAT\" in the last 72 hours.  Lab Results    Component Value Date    PROBNP 30,376 (H) 11/05/2024    PROBNP 43,703 (H) 10/25/2024     No results found for: \"CULTRESP\"    Radiology Review:  Pertinent images / reports were reviewed as a part of this visit.    Assessment:     Patient Active Problem List   Diagnosis    Acute on chronic respiratory failure    Moderate malnutrition (HCC)    Pneumonia of both lower lobes due to Pseudomonas species (HCC)    Central line infection    Acute on chronic hypoxic respiratory failure       Plan:   1. Overall the patient has improved.  2. Wean per protocol.  3. Discussed with the RN.  Bashir Bragg MD   11/8/2024  11:18 AM

## 2024-11-08 NOTE — PROGRESS NOTES
HD treatment for 3 hours, removed 1L.  Tolerated treatment without incident.  Retacrit administered for anemia.      Patient Name: Tico Day  Patient : 1960  MRN: [id number]     Acct: [id number]  Date of Admission: 10/25/2024  Room/Bed: -A  Code Status:  Full Code  Allergies:   Allergies   Allergen Reactions    Reglan [Metoclopramide]     Remeron [Mirtazapine]      Diagnosis:    Patient Active Problem List   Diagnosis    Acute on chronic respiratory failure    Moderate malnutrition (HCC)    Pneumonia of both lower lobes due to Pseudomonas species (HCC)    Central line infection    Acute on chronic hypoxic respiratory failure         Treatment:  Hemodialysis 1:1  Priority: Routine  Location: ICU    Diabetic: No  NPO: No  Isolation Precautions: Contact     Consent for Treatment Verified: Yes  Blood Consent Verified: Not Applicable     Safety Verified: Identify (I), Consent (C), Equipment (E), HepB Status (B), Orders Complete (O), Access Verified (A), and Timeliness (T)  Time out performed prior to access at 1400 hours.    Report Received from Primary RN at 1330 hours.  Primary RN (First Initial, Last Name, Title): LEEANN Cosby RN  Incapacitated Nurse Education Completed: Yes     HBsAg ONLY:  Date Drawn: 2024       Results: Negative  HBsAb:  Date Drawn:  2024       Results: Susceptible <10    Order  Dialysis Bath  K+ (Potassium): 4  Ca+ (Calcium): 2.5  Na+ (Sodium): 138  HCO3 (Bicarb): 35  Bicarbonate Concentrate Lot No.: [id number]  Acid Concentrate Lot No.: [id number]     Na+ Modeling: Not Applicable  Dialyzer: F180  Dialysate Temperature (C):  36  Blood Flow Rate (BFR):  350   Dialysate Flow Rate (DFR):   700        Access to be Utilized   Access: Tunneled Catheter  Location: Internal Jugular  Side: Left   Needle gauge:  Not Applicable  + Bruit/Thrill: Not Applicable    First Use X-ray Verified: Not Applicable  OK to use line order: Not Applicable    Site Assessment:  Signs  Yes  Saline Line Double Clamped?: Yes  Air Foam Detector Engaged?: Yes  Machine Functioning Alarm Free? Yes  Prime Given: 200ml    Chlorine Testing - Before each treatment and every 4 hours:   Treatment  Treatment Number: 1  Time On: 1408  Time Off: 1710  Treatment Goal: 1L 3 hours  Weight - Scale: 97.9 kg (215 lb 13.3 oz) (11/07/24 0600)  1st check: less than 0.1 ppm at: 1329 hours  2nd check: less than 0.1 ppm at: n/a  3rd check: Not Applicable  (if greater than 0.1 ppm, then check every 30 minutes from secondary)    Access Flows and Pressures  Patient Vitals for the past 8 hrs:   Blood Flow Rate (ml/min) Ultrafiltration Rate (ml/hr) Ultrafiltration Removed (ml) Arterial Pressure (mmHg) Venous Pressure (mmHg) TMP DFR Comments Access Visible   11/08/24 1408 350 ml/min 500 ml/hr -- -130 mmHg 120 50 700 tx initiated, lines secure, goal 1.5L Yes   11/08/24 1415 350 ml/min 500 ml/hr 60 ml -130 mmHg 120 60 700 no distress noted Yes   11/08/24 1430 350 ml/min 500 ml/hr 181 ml -130 mmHg 120 50 700 no change Yes   11/08/24 1445 350 ml/min 500 ml/hr 311 ml -140 mmHg 120 60 700 no change Yes   11/08/24 1500 350 ml/min 500 ml/hr 447 ml -140 mmHg 120 50 700 no change Yes   11/08/24 1515 350 ml/min 500 ml/hr 558 ml -140 mmHg 120 60 700 no change Yes   11/08/24 1530 350 ml/min 500 ml/hr 674 ml -140 mmHg 120 50 700 no change Yes   11/08/24 1545 350 ml/min 500 ml/hr 807 ml -140 mmHg 120 60 700 no change Yes   11/08/24 1600 350 ml/min 500 ml/hr 920 ml -140 mmHg 120 50 700 no change Yes   11/08/24 1615 350 ml/min 500 ml/hr 1057 ml -140 mmHg 120 60 700 no change Yes   11/08/24 1630 350 ml/min 500 ml/hr 1171 ml -130 mmHg 130 60 700 nurses at bedside cleaning patient Yes   11/08/24 1645 350 ml/min 500 ml/hr 1297 ml -130 mmHg 140 60 700 watching TV Yes   11/08/24 1700 350 ml/min 500 ml/hr 1417 ml -130 mmHg 140 60 700 nurse at bedside Yes   11/08/24 1710 -- -- 1502 ml -- -- -- -- tx complete --     Vital Signs  Patient Vitals for the

## 2024-11-08 NOTE — DISCHARGE SUMMARY
V2.0  Discharge Summary    Name:  Tico Day /Age/Sex: 1960 (64 y.o. male)   Admit Date: 10/25/2024  Discharge Date: 24    MRN & CSN:  [id number] & [id number] Encounter Date and Time 24 1:35 PM EST    Attending:  Prem Phoenix MD Discharging Provider: Prem Phoenix MD       Hospital Course:     Brief HPI: Tico Day is a 64 y.o. male with ESRD on HD , chronic systolic heart failure, chronic respiratory failure with tracheostomy since , left atrial thrombus, wide-complex tachycardia, CAD with history of PCI in , COPD, anemia of chronic disease, obstructive sleep apnea, anxiety, chronic pain with opioid dependence presented from nursing facility with respiratory distress     Brief Problem Based Course:     Tracheostomy dependence  Acute on chronic respiratory failure with hypoxia requiring ventilatory support  Secondary to acute on chronic systolic heart failure, possible pneumonia  Chest x-ray personally reviewed bilateral pulmonary infiltrates R>L and cardiomegaly Pro-BNP 43,703.  Last echocardiogram from 2024 reviewed EF of 40%.  Negative respiratory viral panel  monitor intake and output measure daily weight  Nephrology consultation for inpatient management of HD and volume management  Sputum culture growing Pseudomonas aeruginosa sensitive to Zosyn switched from meropenem to Zosyn; ID team consulted -finish total 2 weeks of antibiotics as per ID recommendations prior to discharge.  Needs peripheral IV to deliver these antibiotics and cannot be done after dialysis at nursing home tomorrow.  Plan for discharge after last dose tomorrow.  Consulted pulmonology for vent management wean as per protocol as ordered by them.  Continue on pressure support on discharge further wean as per physician at nursing home.     Infected TDC catheter, proteus mirabilis  Painful, tender, erythematous central line   ID team onboard  IR team removed TDC and tip

## 2024-11-10 LAB
MICROORGANISM SPEC CULT: NORMAL
SPECIMEN DESCRIPTION: NORMAL

## 2024-11-12 NOTE — PROGRESS NOTES
Physician Progress Note      PATIENT:               ANAMARIA OROURKE  [facility] #:                  [id number]  :                       1960  ADMIT DATE:       10/25/2024 10:22 PM  DISCH DATE:        2024 7:00 PM  RESPONDING  PROVIDER #:        Prem Phoenix MD          QUERY TEXT:    Patient admitted with CHF exacerbation. Noted documentation of sepsis in Pulm   note on . In order to support the diagnosis of sepsis, please include   additional clinical indicators in your documentation.  Or please document if   the diagnosis of sepsis has been ruled out after further study    The medical record reflects the following:  Risk Factors: 64 y.o male with pMHx of CHF, CKD with a TDC.  Clinical Indicators:  - Pulm - \"Line sepsis.  His Clostridium difficile is   growing Proteus mirabilis\".  - IM DC - Infected TDC catheter, proteus   mirabilis. Painful, tender, erythematous central line: ID team onboard, IR   team removed TDC, and tip sent for culture - growing Proteus and Staph epi.    - Labs: WBC - 4, VS: T - 98F, HR - 72, BP - 111/60  Treatment: TDC removal/replacement, TDC culture, IV antibiotics, ID consult  Options provided:  -- Sepsis was ruled out after study  -- Other - I will add my own diagnosis  -- Disagree - Not applicable / Not valid  -- Disagree - Clinically unable to determine / Unknown  -- Refer to Clinical Documentation Reviewer    PROVIDER RESPONSE TEXT:    Sepsis was ruled out after study.    Query created by: Federico Carcamo on 2024 12:35 PM      Electronically signed by:  Prem Phoenix MD 2024 12:41 PM

## 2024-11-29 ENCOUNTER — APPOINTMENT (OUTPATIENT)
Dept: CT IMAGING | Age: 64
DRG: 432 | End: 2024-11-29
Payer: MEDICARE

## 2024-11-29 ENCOUNTER — HOSPITAL ENCOUNTER (INPATIENT)
Age: 64
LOS: 10 days | Discharge: NURSING, REHAB OR LONG-TERM CARE | DRG: 432 | End: 2024-12-09
Attending: EMERGENCY MEDICINE | Admitting: STUDENT IN AN ORGANIZED HEALTH CARE EDUCATION/TRAINING PROGRAM
Payer: MEDICARE

## 2024-11-29 DIAGNOSIS — E87.1 HYPONATREMIA: Primary | ICD-10-CM

## 2024-11-29 DIAGNOSIS — N18.6 ESRD ON HEMODIALYSIS (HCC): ICD-10-CM

## 2024-11-29 DIAGNOSIS — R10.84 GENERALIZED ABDOMINAL PAIN: ICD-10-CM

## 2024-11-29 DIAGNOSIS — J90 BILATERAL PLEURAL EFFUSION: ICD-10-CM

## 2024-11-29 DIAGNOSIS — R18.8 OTHER ASCITES: ICD-10-CM

## 2024-11-29 DIAGNOSIS — Z99.2 ESRD ON HEMODIALYSIS (HCC): ICD-10-CM

## 2024-11-29 DIAGNOSIS — R18.8 ASCITES OF LIVER: ICD-10-CM

## 2024-11-29 DIAGNOSIS — R57.9 SHOCK: ICD-10-CM

## 2024-11-29 DIAGNOSIS — K72.90 DECOMPENSATION OF CIRRHOSIS OF LIVER (HCC): ICD-10-CM

## 2024-11-29 DIAGNOSIS — K74.60 DECOMPENSATION OF CIRRHOSIS OF LIVER (HCC): ICD-10-CM

## 2024-11-29 DIAGNOSIS — Z99.11 VENTILATOR DEPENDENT (HCC): ICD-10-CM

## 2024-11-29 LAB
ALBUMIN SERPL-MCNC: 2.2 G/DL (ref 3.4–5)
ALBUMIN/GLOB SERPL: 0.5 {RATIO} (ref 1.1–2.2)
ALP SERPL-CCNC: 166 U/L (ref 40–129)
ALT SERPL-CCNC: 18 U/L (ref 10–40)
ANION GAP SERPL CALCULATED.3IONS-SCNC: 4 MMOL/L (ref 9–17)
AST SERPL-CCNC: 45 U/L (ref 15–37)
BASOPHILS # BLD: 0.02 K/UL
BASOPHILS NFR BLD: 0 % (ref 0–1)
BILIRUB SERPL-MCNC: 0.3 MG/DL (ref 0–1)
BNP SERPL-MCNC: ABNORMAL PG/ML (ref 0–125)
BUN SERPL-MCNC: 16 MG/DL (ref 7–20)
CALCIUM SERPL-MCNC: 9.7 MG/DL (ref 8.3–10.6)
CHLORIDE SERPL-SCNC: 93 MMOL/L (ref 99–110)
CO2 SERPL-SCNC: 29 MMOL/L (ref 21–32)
CREAT SERPL-MCNC: 1.5 MG/DL (ref 0.8–1.3)
EOSINOPHIL # BLD: 0.25 K/UL
EOSINOPHILS RELATIVE PERCENT: 4 % (ref 0–3)
ERYTHROCYTE [DISTWIDTH] IN BLOOD BY AUTOMATED COUNT: 17.8 % (ref 11.7–14.9)
GFR, ESTIMATED: 46 ML/MIN/1.73M2
GLUCOSE SERPL-MCNC: 86 MG/DL (ref 74–99)
HCT VFR BLD AUTO: 29.8 % (ref 42–52)
HGB BLD-MCNC: 9.1 G/DL (ref 13.5–18)
IMM GRANULOCYTES # BLD AUTO: 0.03 K/UL
IMM GRANULOCYTES NFR BLD: 0 %
LACTATE BLDV-SCNC: 0.8 MMOL/L (ref 0.4–2)
LIPASE SERPL-CCNC: 16 U/L (ref 13–60)
LYMPHOCYTES NFR BLD: 0.68 K/UL
LYMPHOCYTES RELATIVE PERCENT: 10 % (ref 24–44)
MCH RBC QN AUTO: 29.4 PG (ref 27–31)
MCHC RBC AUTO-ENTMCNC: 30.5 G/DL (ref 32–36)
MCV RBC AUTO: 96.1 FL (ref 78–100)
MONOCYTES NFR BLD: 0.6 K/UL
MONOCYTES NFR BLD: 9 % (ref 0–4)
NEUTROPHILS NFR BLD: 78 % (ref 36–66)
NEUTS SEG NFR BLD: 5.44 K/UL
PLATELET # BLD AUTO: 168 K/UL (ref 140–440)
PMV BLD AUTO: 9.2 FL (ref 7.5–11.1)
POTASSIUM SERPL-SCNC: 3.6 MMOL/L (ref 3.5–5.1)
PROT SERPL-MCNC: 7 G/DL (ref 6.4–8.2)
RBC # BLD AUTO: 3.1 M/UL (ref 4.6–6.2)
SODIUM SERPL-SCNC: 126 MMOL/L (ref 136–145)
WBC OTHER # BLD: 7 K/UL (ref 4–10.5)

## 2024-11-29 PROCEDURE — 83880 ASSAY OF NATRIURETIC PEPTIDE: CPT

## 2024-11-29 PROCEDURE — 6360000002 HC RX W HCPCS: Performed by: EMERGENCY MEDICINE

## 2024-11-29 PROCEDURE — 85025 COMPLETE CBC W/AUTO DIFF WBC: CPT

## 2024-11-29 PROCEDURE — 83690 ASSAY OF LIPASE: CPT

## 2024-11-29 PROCEDURE — 94003 VENT MGMT INPAT SUBQ DAY: CPT

## 2024-11-29 PROCEDURE — 96374 THER/PROPH/DIAG INJ IV PUSH: CPT

## 2024-11-29 PROCEDURE — 74176 CT ABD & PELVIS W/O CONTRAST: CPT

## 2024-11-29 PROCEDURE — 5A1955Z RESPIRATORY VENTILATION, GREATER THAN 96 CONSECUTIVE HOURS: ICD-10-PCS | Performed by: STUDENT IN AN ORGANIZED HEALTH CARE EDUCATION/TRAINING PROGRAM

## 2024-11-29 PROCEDURE — 2000000000 HC ICU R&B

## 2024-11-29 PROCEDURE — 83605 ASSAY OF LACTIC ACID: CPT

## 2024-11-29 PROCEDURE — 6370000000 HC RX 637 (ALT 250 FOR IP): Performed by: EMERGENCY MEDICINE

## 2024-11-29 PROCEDURE — 99285 EMERGENCY DEPT VISIT HI MDM: CPT

## 2024-11-29 PROCEDURE — 80053 COMPREHEN METABOLIC PANEL: CPT

## 2024-11-29 RX ORDER — ONDANSETRON 2 MG/ML
4 INJECTION INTRAMUSCULAR; INTRAVENOUS ONCE
Status: COMPLETED | OUTPATIENT
Start: 2024-11-29 | End: 2024-11-29

## 2024-11-29 RX ORDER — TRAZODONE HYDROCHLORIDE 50 MG/1
50 TABLET, FILM COATED ORAL NIGHTLY
Status: DISCONTINUED | OUTPATIENT
Start: 2024-11-30 | End: 2024-12-09 | Stop reason: HOSPADM

## 2024-11-29 RX ORDER — GABAPENTIN 100 MG/1
100 CAPSULE ORAL DAILY
Status: DISCONTINUED | OUTPATIENT
Start: 2024-11-30 | End: 2024-12-09 | Stop reason: HOSPADM

## 2024-11-29 RX ORDER — ALPRAZOLAM 0.5 MG
0.5 TABLET ORAL ONCE
Status: COMPLETED | OUTPATIENT
Start: 2024-11-29 | End: 2024-11-29

## 2024-11-29 RX ORDER — ONDANSETRON 4 MG/1
4 TABLET, ORALLY DISINTEGRATING ORAL EVERY 8 HOURS PRN
Status: DISCONTINUED | OUTPATIENT
Start: 2024-11-29 | End: 2024-12-09 | Stop reason: HOSPADM

## 2024-11-29 RX ORDER — LANSOPRAZOLE 30 MG/1
30 TABLET, ORALLY DISINTEGRATING, DELAYED RELEASE ORAL DAILY
Status: DISCONTINUED | OUTPATIENT
Start: 2024-11-30 | End: 2024-12-09 | Stop reason: HOSPADM

## 2024-11-29 RX ORDER — SODIUM CHLORIDE 0.9 % (FLUSH) 0.9 %
5-40 SYRINGE (ML) INJECTION PRN
Status: DISCONTINUED | OUTPATIENT
Start: 2024-11-29 | End: 2024-12-09 | Stop reason: HOSPADM

## 2024-11-29 RX ORDER — HEPARIN SODIUM 5000 [USP'U]/ML
5000 INJECTION, SOLUTION INTRAVENOUS; SUBCUTANEOUS EVERY 8 HOURS SCHEDULED
Status: DISCONTINUED | OUTPATIENT
Start: 2024-11-30 | End: 2024-12-09 | Stop reason: HOSPADM

## 2024-11-29 RX ORDER — SODIUM CHLORIDE 0.9 % (FLUSH) 0.9 %
5-40 SYRINGE (ML) INJECTION EVERY 12 HOURS SCHEDULED
Status: DISCONTINUED | OUTPATIENT
Start: 2024-11-29 | End: 2024-12-09 | Stop reason: HOSPADM

## 2024-11-29 RX ORDER — ENOXAPARIN SODIUM 100 MG/ML
30 INJECTION SUBCUTANEOUS 2 TIMES DAILY
Status: DISCONTINUED | OUTPATIENT
Start: 2024-11-30 | End: 2024-11-29

## 2024-11-29 RX ORDER — SUCRALFATE 1 G/1
1 TABLET ORAL EVERY 8 HOURS SCHEDULED
Status: DISCONTINUED | OUTPATIENT
Start: 2024-11-30 | End: 2024-12-09 | Stop reason: HOSPADM

## 2024-11-29 RX ORDER — MIDODRINE HYDROCHLORIDE 5 MG/1
10 TABLET ORAL 3 TIMES DAILY PRN
Status: DISCONTINUED | OUTPATIENT
Start: 2024-11-29 | End: 2024-11-30

## 2024-11-29 RX ORDER — ONDANSETRON 2 MG/ML
4 INJECTION INTRAMUSCULAR; INTRAVENOUS EVERY 6 HOURS PRN
Status: DISCONTINUED | OUTPATIENT
Start: 2024-11-29 | End: 2024-12-09 | Stop reason: HOSPADM

## 2024-11-29 RX ORDER — ATORVASTATIN CALCIUM 40 MG/1
40 TABLET, FILM COATED ORAL NIGHTLY
Status: DISCONTINUED | OUTPATIENT
Start: 2024-11-30 | End: 2024-12-09 | Stop reason: HOSPADM

## 2024-11-29 RX ORDER — OXYCODONE HYDROCHLORIDE 5 MG/1
5 TABLET ORAL EVERY 8 HOURS PRN
Status: COMPLETED | OUTPATIENT
Start: 2024-11-29 | End: 2024-12-01

## 2024-11-29 RX ORDER — LACTULOSE 10 G/15ML
20 SOLUTION ORAL 3 TIMES DAILY
Status: DISCONTINUED | OUTPATIENT
Start: 2024-11-30 | End: 2024-12-09 | Stop reason: HOSPADM

## 2024-11-29 RX ORDER — SODIUM CHLORIDE 9 MG/ML
INJECTION, SOLUTION INTRAVENOUS PRN
Status: DISCONTINUED | OUTPATIENT
Start: 2024-11-29 | End: 2024-11-30

## 2024-11-29 RX ADMIN — ONDANSETRON 4 MG: 2 INJECTION INTRAMUSCULAR; INTRAVENOUS at 16:22

## 2024-11-29 RX ADMIN — ALPRAZOLAM 0.5 MG: 0.5 TABLET ORAL at 16:21

## 2024-11-29 ASSESSMENT — PAIN DESCRIPTION - LOCATION: LOCATION: ABDOMEN

## 2024-11-29 ASSESSMENT — PAIN DESCRIPTION - DESCRIPTORS: DESCRIPTORS: ACHING;DISCOMFORT;TIGHTNESS

## 2024-11-29 ASSESSMENT — PAIN - FUNCTIONAL ASSESSMENT: PAIN_FUNCTIONAL_ASSESSMENT: 0-10

## 2024-11-29 ASSESSMENT — PAIN DESCRIPTION - ORIENTATION: ORIENTATION: RIGHT;LEFT

## 2024-11-29 ASSESSMENT — PULMONARY FUNCTION TESTS: PIF_VALUE: 20

## 2024-11-29 ASSESSMENT — PAIN SCALES - GENERAL: PAINLEVEL_OUTOF10: 7

## 2024-11-29 NOTE — ED NOTES
Patient is resting in bed, has a call light within reach, is not in acute distress, and denies any needs

## 2024-11-29 NOTE — ED PROVIDER NOTES
[facility] October 25, 2024 for respiratory failure      Disposition Considerations (tests considered but not done, Shared Decision Making, Pt Expectation of Test or Tx.):     Consults: Gastroenterologist Dr. Glenroy phillips, hospitalist Dr. Smith,         I am the Primary Clinician of Record.    Is this patient to be included in the SEP-1 Core Measure due to severe sepsis or septic shock?   No   Exclusion criteria - the patient is NOT to be included for SEP-1 Core Measure due to:  Infection is not suspected      Clinical Impression:  1. Hyponatremia    2. Other ascites    3. ESRD on hemodialysis (HCC)    4. Generalized abdominal pain    5. Ventilator dependent (HCC)    6. Bilateral pleural effusion    7. Decompensation of cirrhosis of liver (HCC)              ED Provider Disposition Time  DISPOSITION   9:35 PM            Comment: Please note this report has been produced using speech recognition software and may contain errors related to that system including errors in grammar, punctuation, and spelling, as well as words and phrases that may be inappropriate.  Efforts were made to edit the dictations.        Su Steiner,   11/30/24 0913

## 2024-11-30 LAB
ALBUMIN SERPL-MCNC: 2 G/DL (ref 3.4–5)
ALBUMIN/GLOB SERPL: 0.5 {RATIO} (ref 1.1–2.2)
ALP SERPL-CCNC: 142 U/L (ref 40–129)
ALT SERPL-CCNC: 15 U/L (ref 10–40)
AMMONIA PLAS-SCNC: 32 UMOL/L (ref 16–60)
ANION GAP SERPL CALCULATED.3IONS-SCNC: 2 MMOL/L (ref 9–17)
ANION GAP SERPL CALCULATED.3IONS-SCNC: 4 MMOL/L (ref 9–17)
ANION GAP SERPL CALCULATED.3IONS-SCNC: 5 MMOL/L (ref 9–17)
AST SERPL-CCNC: 32 U/L (ref 15–37)
BASOPHILS # BLD: 0.02 K/UL
BASOPHILS NFR BLD: 0 % (ref 0–1)
BILIRUB SERPL-MCNC: 0.3 MG/DL (ref 0–1)
BNP SERPL-MCNC: ABNORMAL PG/ML (ref 0–125)
BUN SERPL-MCNC: 16 MG/DL (ref 7–20)
BUN SERPL-MCNC: 18 MG/DL (ref 7–20)
BUN SERPL-MCNC: 19 MG/DL (ref 7–20)
CA-I BLD-SCNC: 1.46 MMOL/L (ref 1.15–1.33)
CA-I BLD-SCNC: 1.5 MMOL/L (ref 1.15–1.33)
CALCIUM SERPL-MCNC: 10 MG/DL (ref 8.3–10.6)
CALCIUM SERPL-MCNC: 9.8 MG/DL (ref 8.3–10.6)
CALCIUM SERPL-MCNC: 9.8 MG/DL (ref 8.3–10.6)
CHLORIDE SERPL-SCNC: 94 MMOL/L (ref 99–110)
CHLORIDE SERPL-SCNC: 94 MMOL/L (ref 99–110)
CHLORIDE SERPL-SCNC: 95 MMOL/L (ref 99–110)
CO2 SERPL-SCNC: 29 MMOL/L (ref 21–32)
CO2 SERPL-SCNC: 29 MMOL/L (ref 21–32)
CO2 SERPL-SCNC: 30 MMOL/L (ref 21–32)
CREAT SERPL-MCNC: 1.7 MG/DL (ref 0.8–1.3)
CREAT SERPL-MCNC: 1.9 MG/DL (ref 0.8–1.3)
CREAT SERPL-MCNC: 2 MG/DL (ref 0.8–1.3)
EOSINOPHIL # BLD: 0.34 K/UL
EOSINOPHILS RELATIVE PERCENT: 6 % (ref 0–3)
ERYTHROCYTE [DISTWIDTH] IN BLOOD BY AUTOMATED COUNT: 18.3 % (ref 11.7–14.9)
GFR, ESTIMATED: 34 ML/MIN/1.73M2
GFR, ESTIMATED: 35 ML/MIN/1.73M2
GFR, ESTIMATED: 41 ML/MIN/1.73M2
GLUCOSE BLD-MCNC: 80 MG/DL (ref 74–99)
GLUCOSE SERPL-MCNC: 63 MG/DL (ref 74–99)
GLUCOSE SERPL-MCNC: 78 MG/DL (ref 74–99)
GLUCOSE SERPL-MCNC: 86 MG/DL (ref 74–99)
HAV IGM SERPL QL IA: NONREACTIVE
HBV CORE IGM SERPL QL IA: NONREACTIVE
HBV SURFACE AG SERPL QL IA: NONREACTIVE
HCT VFR BLD AUTO: 25.1 % (ref 42–52)
HCV AB SERPL QL IA: NONREACTIVE
HGB BLD-MCNC: 7.5 G/DL (ref 13.5–18)
IMM GRANULOCYTES # BLD AUTO: 0.02 K/UL
IMM GRANULOCYTES NFR BLD: 0 %
INR PPP: 1.2
LDH SERPL-CCNC: 84 U/L (ref 100–190)
LYMPHOCYTES NFR BLD: 0.67 K/UL
LYMPHOCYTES RELATIVE PERCENT: 13 % (ref 24–44)
MAGNESIUM SERPL-MCNC: 2.2 MG/DL (ref 1.8–2.4)
MAGNESIUM SERPL-MCNC: 2.2 MG/DL (ref 1.8–2.4)
MCH RBC QN AUTO: 28.7 PG (ref 27–31)
MCHC RBC AUTO-ENTMCNC: 29.9 G/DL (ref 32–36)
MCV RBC AUTO: 96.2 FL (ref 78–100)
MONOCYTES NFR BLD: 0.46 K/UL
MONOCYTES NFR BLD: 9 % (ref 0–4)
NEUTROPHILS NFR BLD: 72 % (ref 36–66)
NEUTS SEG NFR BLD: 3.8 K/UL
PHOSPHATE SERPL-MCNC: 1.9 MG/DL (ref 2.5–4.9)
PHOSPHATE SERPL-MCNC: 2.1 MG/DL (ref 2.5–4.9)
PLATELET # BLD AUTO: 158 K/UL (ref 140–440)
PMV BLD AUTO: 8.8 FL (ref 7.5–11.1)
POTASSIUM SERPL-SCNC: 3.7 MMOL/L (ref 3.5–5.1)
POTASSIUM SERPL-SCNC: 3.7 MMOL/L (ref 3.5–5.1)
POTASSIUM SERPL-SCNC: 4 MMOL/L (ref 3.5–5.1)
PROT SERPL-MCNC: 6.2 G/DL (ref 6.4–8.2)
PROTHROMBIN TIME: 15.7 SEC (ref 11.7–14.5)
RBC # BLD AUTO: 2.61 M/UL (ref 4.6–6.2)
SODIUM SERPL-SCNC: 126 MMOL/L (ref 136–145)
SODIUM SERPL-SCNC: 127 MMOL/L (ref 136–145)
SODIUM SERPL-SCNC: 130 MMOL/L (ref 136–145)
WBC OTHER # BLD: 5.3 K/UL (ref 4–10.5)

## 2024-11-30 PROCEDURE — 90935 HEMODIALYSIS ONE EVALUATION: CPT

## 2024-11-30 PROCEDURE — 87186 SC STD MICRODIL/AGAR DIL: CPT

## 2024-11-30 PROCEDURE — 82140 ASSAY OF AMMONIA: CPT

## 2024-11-30 PROCEDURE — P9047 ALBUMIN (HUMAN), 25%, 50ML: HCPCS | Performed by: STUDENT IN AN ORGANIZED HEALTH CARE EDUCATION/TRAINING PROGRAM

## 2024-11-30 PROCEDURE — 82947 ASSAY GLUCOSE BLOOD QUANT: CPT

## 2024-11-30 PROCEDURE — 99223 1ST HOSP IP/OBS HIGH 75: CPT | Performed by: INTERNAL MEDICINE

## 2024-11-30 PROCEDURE — 85610 PROTHROMBIN TIME: CPT

## 2024-11-30 PROCEDURE — 6360000002 HC RX W HCPCS: Performed by: STUDENT IN AN ORGANIZED HEALTH CARE EDUCATION/TRAINING PROGRAM

## 2024-11-30 PROCEDURE — 87070 CULTURE OTHR SPECIMN AEROBIC: CPT

## 2024-11-30 PROCEDURE — 2500000003 HC RX 250 WO HCPCS: Performed by: INTERNAL MEDICINE

## 2024-11-30 PROCEDURE — 80053 COMPREHEN METABOLIC PANEL: CPT

## 2024-11-30 PROCEDURE — 6360000002 HC RX W HCPCS: Performed by: INTERNAL MEDICINE

## 2024-11-30 PROCEDURE — 87205 SMEAR GRAM STAIN: CPT

## 2024-11-30 PROCEDURE — 2709999900 HC NON-CHARGEABLE SUPPLY

## 2024-11-30 PROCEDURE — 36592 COLLECT BLOOD FROM PICC: CPT

## 2024-11-30 PROCEDURE — 82330 ASSAY OF CALCIUM: CPT

## 2024-11-30 PROCEDURE — 36415 COLL VENOUS BLD VENIPUNCTURE: CPT

## 2024-11-30 PROCEDURE — C1751 CATH, INF, PER/CENT/MIDLINE: HCPCS

## 2024-11-30 PROCEDURE — 83735 ASSAY OF MAGNESIUM: CPT

## 2024-11-30 PROCEDURE — 87077 CULTURE AEROBIC IDENTIFY: CPT

## 2024-11-30 PROCEDURE — 89220 SPUTUM SPECIMEN COLLECTION: CPT

## 2024-11-30 PROCEDURE — 2500000003 HC RX 250 WO HCPCS: Performed by: STUDENT IN AN ORGANIZED HEALTH CARE EDUCATION/TRAINING PROGRAM

## 2024-11-30 PROCEDURE — 84100 ASSAY OF PHOSPHORUS: CPT

## 2024-11-30 PROCEDURE — 80074 ACUTE HEPATITIS PANEL: CPT

## 2024-11-30 PROCEDURE — 83615 LACTATE (LD) (LDH) ENZYME: CPT

## 2024-11-30 PROCEDURE — 83880 ASSAY OF NATRIURETIC PEPTIDE: CPT

## 2024-11-30 PROCEDURE — 2700000000 HC OXYGEN THERAPY PER DAY

## 2024-11-30 PROCEDURE — 2580000003 HC RX 258: Performed by: STUDENT IN AN ORGANIZED HEALTH CARE EDUCATION/TRAINING PROGRAM

## 2024-11-30 PROCEDURE — 05HY33Z INSERTION OF INFUSION DEVICE INTO UPPER VEIN, PERCUTANEOUS APPROACH: ICD-10-PCS | Performed by: STUDENT IN AN ORGANIZED HEALTH CARE EDUCATION/TRAINING PROGRAM

## 2024-11-30 PROCEDURE — 6370000000 HC RX 637 (ALT 250 FOR IP): Performed by: STUDENT IN AN ORGANIZED HEALTH CARE EDUCATION/TRAINING PROGRAM

## 2024-11-30 PROCEDURE — 85025 COMPLETE CBC W/AUTO DIFF WBC: CPT

## 2024-11-30 PROCEDURE — 80048 BASIC METABOLIC PNL TOTAL CA: CPT

## 2024-11-30 PROCEDURE — 2580000003 HC RX 258: Performed by: INTERNAL MEDICINE

## 2024-11-30 PROCEDURE — 5A1D70Z PERFORMANCE OF URINARY FILTRATION, INTERMITTENT, LESS THAN 6 HOURS PER DAY: ICD-10-PCS | Performed by: STUDENT IN AN ORGANIZED HEALTH CARE EDUCATION/TRAINING PROGRAM

## 2024-11-30 PROCEDURE — 2000000000 HC ICU R&B

## 2024-11-30 PROCEDURE — 94002 VENT MGMT INPAT INIT DAY: CPT

## 2024-11-30 PROCEDURE — 3E033XZ INTRODUCTION OF VASOPRESSOR INTO PERIPHERAL VEIN, PERCUTANEOUS APPROACH: ICD-10-PCS | Performed by: STUDENT IN AN ORGANIZED HEALTH CARE EDUCATION/TRAINING PROGRAM

## 2024-11-30 PROCEDURE — 90945 DIALYSIS ONE EVALUATION: CPT

## 2024-11-30 PROCEDURE — 76937 US GUIDE VASCULAR ACCESS: CPT

## 2024-11-30 PROCEDURE — 36569 INSJ PICC 5 YR+ W/O IMAGING: CPT

## 2024-11-30 PROCEDURE — P9047 ALBUMIN (HUMAN), 25%, 50ML: HCPCS | Performed by: INTERNAL MEDICINE

## 2024-11-30 RX ORDER — POTASSIUM CHLORIDE 29.8 MG/ML
20 INJECTION INTRAVENOUS PRN
Status: DISCONTINUED | OUTPATIENT
Start: 2024-11-30 | End: 2024-12-05

## 2024-11-30 RX ORDER — ALBUMIN (HUMAN) 12.5 G/50ML
25 SOLUTION INTRAVENOUS DAILY
Status: COMPLETED | OUTPATIENT
Start: 2024-11-30 | End: 2024-12-02

## 2024-11-30 RX ORDER — 0.9 % SODIUM CHLORIDE 0.9 %
2000 INTRAVENOUS SOLUTION INTRAVENOUS PRN
Status: DISCONTINUED | OUTPATIENT
Start: 2024-11-30 | End: 2024-12-05

## 2024-11-30 RX ORDER — SODIUM CHLORIDE 0.9 % (FLUSH) 0.9 %
5-40 SYRINGE (ML) INJECTION EVERY 12 HOURS SCHEDULED
Status: DISCONTINUED | OUTPATIENT
Start: 2024-11-30 | End: 2024-12-09 | Stop reason: HOSPADM

## 2024-11-30 RX ORDER — HEPARIN SODIUM 1000 [USP'U]/ML
INJECTION, SOLUTION INTRAVENOUS; SUBCUTANEOUS PRN
Status: DISCONTINUED | OUTPATIENT
Start: 2024-11-30 | End: 2024-12-05

## 2024-11-30 RX ORDER — SODIUM CHLORIDE 0.9 % (FLUSH) 0.9 %
5-40 SYRINGE (ML) INJECTION PRN
Status: DISCONTINUED | OUTPATIENT
Start: 2024-11-30 | End: 2024-12-09 | Stop reason: HOSPADM

## 2024-11-30 RX ORDER — NOREPINEPHRINE BITARTRATE 0.06 MG/ML
1-100 INJECTION, SOLUTION INTRAVENOUS CONTINUOUS
Status: DISCONTINUED | OUTPATIENT
Start: 2024-11-30 | End: 2024-12-09 | Stop reason: HOSPADM

## 2024-11-30 RX ORDER — SODIUM CHLORIDE 9 MG/ML
INJECTION, SOLUTION INTRAVENOUS PRN
Status: DISCONTINUED | OUTPATIENT
Start: 2024-11-30 | End: 2024-12-09 | Stop reason: HOSPADM

## 2024-11-30 RX ORDER — MIDODRINE HYDROCHLORIDE 5 MG/1
10 TABLET ORAL EVERY 8 HOURS
Status: DISCONTINUED | OUTPATIENT
Start: 2024-11-30 | End: 2024-12-04

## 2024-11-30 RX ORDER — MAGNESIUM SULFATE 1 G/100ML
1000 INJECTION INTRAVENOUS PRN
Status: DISCONTINUED | OUTPATIENT
Start: 2024-11-30 | End: 2024-12-05

## 2024-11-30 RX ORDER — LIDOCAINE HYDROCHLORIDE 10 MG/ML
50 INJECTION, SOLUTION INFILTRATION; PERINEURAL ONCE
Status: COMPLETED | OUTPATIENT
Start: 2024-11-30 | End: 2024-11-30

## 2024-11-30 RX ORDER — HYDROMORPHONE HYDROCHLORIDE 1 MG/ML
0.5 INJECTION, SOLUTION INTRAMUSCULAR; INTRAVENOUS; SUBCUTANEOUS ONCE
Status: COMPLETED | OUTPATIENT
Start: 2024-11-30 | End: 2024-11-30

## 2024-11-30 RX ORDER — ALPRAZOLAM 0.5 MG
0.5 TABLET ORAL 3 TIMES DAILY PRN
Status: DISCONTINUED | OUTPATIENT
Start: 2024-11-30 | End: 2024-12-01

## 2024-11-30 RX ORDER — ALBUMIN (HUMAN) 12.5 G/50ML
25 SOLUTION INTRAVENOUS ONCE
Status: COMPLETED | OUTPATIENT
Start: 2024-11-30 | End: 2024-11-30

## 2024-11-30 RX ORDER — CALCIUM GLUCONATE 20 MG/ML
1000 INJECTION, SOLUTION INTRAVENOUS PRN
Status: DISCONTINUED | OUTPATIENT
Start: 2024-11-30 | End: 2024-12-05

## 2024-11-30 RX ORDER — CALCIUM GLUCONATE 20 MG/ML
2000 INJECTION, SOLUTION INTRAVENOUS PRN
Status: DISCONTINUED | OUTPATIENT
Start: 2024-11-30 | End: 2024-12-05

## 2024-11-30 RX ADMIN — LACTULOSE 20 G: 20 SOLUTION ORAL at 20:45

## 2024-11-30 RX ADMIN — SODIUM CHLORIDE, PRESERVATIVE FREE 10 ML: 5 INJECTION INTRAVENOUS at 15:26

## 2024-11-30 RX ADMIN — ALBUMIN (HUMAN) 25 G: 0.25 INJECTION, SOLUTION INTRAVENOUS at 18:02

## 2024-11-30 RX ADMIN — SUCRALFATE 1 G: 1 TABLET ORAL at 01:04

## 2024-11-30 RX ADMIN — SUCRALFATE 1 G: 1 TABLET ORAL at 13:50

## 2024-11-30 RX ADMIN — HEPARIN SODIUM 5000 UNITS: 5000 INJECTION INTRAVENOUS; SUBCUTANEOUS at 18:11

## 2024-11-30 RX ADMIN — SODIUM CHLORIDE, PRESERVATIVE FREE 10 ML: 5 INJECTION INTRAVENOUS at 11:50

## 2024-11-30 RX ADMIN — OXYCODONE HYDROCHLORIDE 5 MG: 5 TABLET ORAL at 18:05

## 2024-11-30 RX ADMIN — ATORVASTATIN CALCIUM 40 MG: 40 TABLET, FILM COATED ORAL at 20:45

## 2024-11-30 RX ADMIN — MIDODRINE HYDROCHLORIDE 10 MG: 5 TABLET ORAL at 19:36

## 2024-11-30 RX ADMIN — ALPRAZOLAM 0.5 MG: 0.5 TABLET ORAL at 13:50

## 2024-11-30 RX ADMIN — SODIUM CHLORIDE, PRESERVATIVE FREE 10 ML: 5 INJECTION INTRAVENOUS at 01:09

## 2024-11-30 RX ADMIN — MIDODRINE HYDROCHLORIDE 10 MG: 5 TABLET ORAL at 15:26

## 2024-11-30 RX ADMIN — MIDODRINE HYDROCHLORIDE 10 MG: 5 TABLET ORAL at 08:37

## 2024-11-30 RX ADMIN — Medication: at 11:45

## 2024-11-30 RX ADMIN — SODIUM CHLORIDE, PRESERVATIVE FREE 10 ML: 5 INJECTION INTRAVENOUS at 20:47

## 2024-11-30 RX ADMIN — SODIUM PHOSPHATE, MONOBASIC, MONOHYDRATE AND SODIUM PHOSPHATE, DIBASIC, ANHYDROUS 6 MMOL: 142; 276 INJECTION, SOLUTION INTRAVENOUS at 23:35

## 2024-11-30 RX ADMIN — LIDOCAINE HYDROCHLORIDE 50 MG: 10 INJECTION, SOLUTION INFILTRATION; PERINEURAL at 13:02

## 2024-11-30 RX ADMIN — Medication 5 MCG/MIN: at 10:38

## 2024-11-30 RX ADMIN — TRAZODONE HYDROCHLORIDE 50 MG: 50 TABLET ORAL at 20:45

## 2024-11-30 RX ADMIN — ATORVASTATIN CALCIUM 40 MG: 40 TABLET, FILM COATED ORAL at 01:04

## 2024-11-30 RX ADMIN — SODIUM PHOSPHATE, MONOBASIC, MONOHYDRATE AND SODIUM PHOSPHATE, DIBASIC, ANHYDROUS 12 MMOL: 142; 276 INJECTION, SOLUTION INTRAVENOUS at 17:57

## 2024-11-30 RX ADMIN — LANSOPRAZOLE 30 MG: 30 TABLET, ORALLY DISINTEGRATING ORAL at 08:17

## 2024-11-30 RX ADMIN — ALBUMIN (HUMAN) 25 G: 0.25 INJECTION, SOLUTION INTRAVENOUS at 14:03

## 2024-11-30 RX ADMIN — RIFAXIMIN 550 MG: 550 TABLET ORAL at 11:16

## 2024-11-30 RX ADMIN — RIFAXIMIN 550 MG: 550 TABLET ORAL at 20:45

## 2024-11-30 RX ADMIN — MICONAZOLE NITRATE: 2 POWDER TOPICAL at 20:51

## 2024-11-30 RX ADMIN — HYDROMORPHONE HYDROCHLORIDE 0.5 MG: 1 INJECTION, SOLUTION INTRAMUSCULAR; INTRAVENOUS; SUBCUTANEOUS at 14:27

## 2024-11-30 RX ADMIN — OXYCODONE HYDROCHLORIDE 5 MG: 5 TABLET ORAL at 11:49

## 2024-11-30 RX ADMIN — SUCRALFATE 1 G: 1 TABLET ORAL at 22:12

## 2024-11-30 RX ADMIN — Medication: at 21:29

## 2024-11-30 RX ADMIN — TRAZODONE HYDROCHLORIDE 50 MG: 50 TABLET ORAL at 01:04

## 2024-11-30 RX ADMIN — GABAPENTIN 100 MG: 100 CAPSULE ORAL at 11:16

## 2024-11-30 RX ADMIN — LACTULOSE 20 G: 20 SOLUTION ORAL at 11:15

## 2024-11-30 RX ADMIN — LACTULOSE 20 G: 20 SOLUTION ORAL at 01:08

## 2024-11-30 RX ADMIN — SUCRALFATE 1 G: 1 TABLET ORAL at 08:16

## 2024-11-30 RX ADMIN — SODIUM CHLORIDE, PRESERVATIVE FREE 10 ML: 5 INJECTION INTRAVENOUS at 20:46

## 2024-11-30 RX ADMIN — LACTULOSE 20 G: 20 SOLUTION ORAL at 13:50

## 2024-11-30 RX ADMIN — MIDODRINE HYDROCHLORIDE 10 MG: 5 TABLET ORAL at 01:07

## 2024-11-30 RX ADMIN — RIFAXIMIN 550 MG: 550 TABLET ORAL at 01:04

## 2024-11-30 ASSESSMENT — PULMONARY FUNCTION TESTS
PIF_VALUE: 16
PIF_VALUE: 15
PIF_VALUE: 15
PIF_VALUE: 16
PIF_VALUE: 15
PIF_VALUE: 16
PIF_VALUE: 15
PIF_VALUE: 15
PIF_VALUE: 16
PIF_VALUE: 15
PIF_VALUE: 16
PIF_VALUE: 15
PIF_VALUE: 15
PIF_VALUE: 16
PIF_VALUE: 15

## 2024-11-30 ASSESSMENT — PAIN DESCRIPTION - LOCATION
LOCATION: BACK
LOCATION: BUTTOCKS
LOCATION: BACK
LOCATION: BACK;COCCYX;BUTTOCKS
LOCATION: BACK;BUTTOCKS
LOCATION: BUTTOCKS;COCCYX

## 2024-11-30 ASSESSMENT — PAIN SCALES - WONG BAKER

## 2024-11-30 ASSESSMENT — PAIN SCALES - GENERAL
PAINLEVEL_OUTOF10: 10
PAINLEVEL_OUTOF10: 0
PAINLEVEL_OUTOF10: 9
PAINLEVEL_OUTOF10: 9
PAINLEVEL_OUTOF10: 0
PAINLEVEL_OUTOF10: 10
PAINLEVEL_OUTOF10: 8
PAINLEVEL_OUTOF10: 9

## 2024-11-30 ASSESSMENT — PAIN DESCRIPTION - DESCRIPTORS
DESCRIPTORS: DISCOMFORT

## 2024-11-30 ASSESSMENT — PAIN DESCRIPTION - ONSET: ONSET: ON-GOING

## 2024-11-30 ASSESSMENT — PAIN DESCRIPTION - ORIENTATION
ORIENTATION: MID;LOWER
ORIENTATION: MID;LOWER
ORIENTATION: LEFT;RIGHT

## 2024-11-30 ASSESSMENT — PAIN DESCRIPTION - PAIN TYPE
TYPE: CHRONIC PAIN
TYPE: CHRONIC PAIN

## 2024-11-30 ASSESSMENT — PAIN DESCRIPTION - FREQUENCY: FREQUENCY: CONTINUOUS

## 2024-11-30 NOTE — H&P
of education: None    Highest education level: None   Tobacco Use    Smoking status: Former     Current packs/day: 0.50     Average packs/day: 0.5 packs/day for 32.7 years (16.4 ttl pk-yrs)     Types: Cigarettes     Start date: 3/7/1992     Passive exposure: Never    Smokeless tobacco: Never   Substance and Sexual Activity    Alcohol use: Never     Social Determinants of Health     Food Insecurity: No Food Insecurity (10/26/2024)    Hunger Vital Sign     Worried About Running Out of Food in the Last Year: Never true     Ran Out of Food in the Last Year: Never true   Transportation Needs: No Transportation Needs (10/26/2024)    PRAPARE - Transportation     Lack of Transportation (Medical): No     Lack of Transportation (Non-Medical): No   Intimate Partner Violence: Not At Risk (8/25/2024)    Received from [facility]    Humiliation, Afraid, Rape, and Kick questionnaire     Fear of Current or Ex-Partner: No     Emotionally Abused: No     Physically Abused: No     Sexually Abused: No   Housing Stability: Low Risk  (10/26/2024)    Housing Stability Vital Sign     Unable to Pay for Housing in the Last Year: No     Number of Times Moved in the Last Year: 1     Homeless in the Last Year: No       Medications Prior to Admission     Prior to Admission medications    Medication Sig Start Date End Date Taking? Authorizing Provider   acetaminophen (TYLENOL) 500 MG tablet 2 tablets by Enteral route every 8 hours as needed 8/6/24   Harsh Malhotra MD   albuterol (PROVENTIL) (2.5 MG/3ML) 0.083% nebulizer solution Inhale 3 mLs into the lungs every 4 hours 8/6/24   Harsh Malhotra MD   atorvastatin (LIPITOR) 40 MG tablet 1 tablet by Enteral route nightly 5/20/24   Harsh Malhotra MD   gabapentin (NEURONTIN) 100 MG capsule 1 capsule by Per G Tube route daily.    Harsh Malhotra MD   loperamide (IMODIUM) 2 MG capsule Take 1 capsule by mouth as needed for Diarrhea 8/6/24   Harsh Malhotra MD   melatonin

## 2024-11-30 NOTE — DIALYSIS
Patient Name: Tico Day  Patient : 1960  MRN: [id number]     Acct: [id number]  Date of Admission: 2024  Room/Bed: -A  Code Status:  Full Code  Allergies:   Allergies   Allergen Reactions    Reglan [Metoclopramide]     Remeron [Mirtazapine]      Diagnosis:    Patient Active Problem List   Diagnosis    Acute on chronic respiratory failure    Moderate malnutrition (HCC)    Pneumonia of both lower lobes due to Pseudomonas species (HCC)    Central line infection    Acute on chronic hypoxic respiratory failure    Decompensation of cirrhosis of liver (HCC)         .DIALYSIS NOTE:   Unable to start Hemodialysis due to low blood pressures.  ProAmatine give as per order with no positive results.  Dr Hoskins and ICU internist agreed that  patient needed CRRT instead.  Did not connect patient to dialysis.  Blood drawn for labs, and HD line dressing changed.          Site Assessment:  Signs and Symptoms of Infection/Inflammation: None  If yes: Not Applicable  Dressing: Dry and Intact  Site Prep: Medical Aseptic Technique  Dressing Changed this Treatment: Yes  If yes, by whom: Yaakov SEE  Date of Last Dressing Change: Not Applicable  Antimicrobial Patch in place?: Yes  Red Alcohol Caps in place?: Yes  Gauze Dressing?: No  Non Dialysis Use?: No  Arterial Catheter Locking Solution: saline  Venous Catheter Locking Solution: saline          Labs  Recent Labs     24  1520 24  0650   WBC 7.0 5.3   HGB 9.1* 7.5*   HCT 29.8* 25.1*    158                                                                  Recent Labs     24  1635 24  0650   * 126*   K 3.6 3.7   CL 93* 94*   CO2 29 30   BUN 16 19   CREATININE 1.5* 2.0*   GLUCOSE 86 63*        Vital Signs  Patient Vitals for the past 8 hrs:   BP Temp Pulse Resp SpO2   24 0200 (!) 80/50 -- 79 16 95 %   24 0300 (!) 86/52 -- 78 19 96 %   24 0315 -- -- 78 18 97 %   24 0400 (!) 87/53 98.5 °F (36.9 °C) 78 16 94

## 2024-11-30 NOTE — FLOWSHEET NOTE
Pt does not want this nurse to elevate heels off of bed, skin protectant applied. Pt also does not want repositioned at times, this nurse educated pt on the need to allow both of these things.

## 2024-12-01 LAB
ANION GAP SERPL CALCULATED.3IONS-SCNC: 2 MMOL/L (ref 9–17)
ANION GAP SERPL CALCULATED.3IONS-SCNC: 3 MMOL/L (ref 9–17)
ANION GAP SERPL CALCULATED.3IONS-SCNC: 4 MMOL/L (ref 9–17)
ANION GAP SERPL CALCULATED.3IONS-SCNC: 4 MMOL/L (ref 9–17)
BUN SERPL-MCNC: 11 MG/DL (ref 7–20)
BUN SERPL-MCNC: 13 MG/DL (ref 7–20)
BUN SERPL-MCNC: 13 MG/DL (ref 7–20)
BUN SERPL-MCNC: 14 MG/DL (ref 7–20)
CA-I BLD-SCNC: 1.43 MMOL/L (ref 1.15–1.33)
CA-I BLD-SCNC: 1.46 MMOL/L (ref 1.15–1.33)
CA-I BLD-SCNC: 1.5 MMOL/L (ref 1.15–1.33)
CALCIUM SERPL-MCNC: 9.8 MG/DL (ref 8.3–10.6)
CALCIUM SERPL-MCNC: 9.9 MG/DL (ref 8.3–10.6)
CHLORIDE SERPL-SCNC: 98 MMOL/L (ref 99–110)
CHLORIDE SERPL-SCNC: 99 MMOL/L (ref 99–110)
CO2 SERPL-SCNC: 27 MMOL/L (ref 21–32)
CO2 SERPL-SCNC: 28 MMOL/L (ref 21–32)
CO2 SERPL-SCNC: 29 MMOL/L (ref 21–32)
CO2 SERPL-SCNC: 29 MMOL/L (ref 21–32)
CREAT SERPL-MCNC: 1.5 MG/DL (ref 0.8–1.3)
CREAT SERPL-MCNC: 1.6 MG/DL (ref 0.8–1.3)
GFR, ESTIMATED: 44 ML/MIN/1.73M2
GFR, ESTIMATED: 44 ML/MIN/1.73M2
GFR, ESTIMATED: 45 ML/MIN/1.73M2
GFR, ESTIMATED: 47 ML/MIN/1.73M2
GLUCOSE SERPL-MCNC: 79 MG/DL (ref 74–99)
GLUCOSE SERPL-MCNC: 80 MG/DL (ref 74–99)
GLUCOSE SERPL-MCNC: 88 MG/DL (ref 74–99)
GLUCOSE SERPL-MCNC: 98 MG/DL (ref 74–99)
MAGNESIUM SERPL-MCNC: 2.3 MG/DL (ref 1.8–2.4)
PHOSPHATE SERPL-MCNC: 1.8 MG/DL (ref 2.5–4.9)
PHOSPHATE SERPL-MCNC: 1.9 MG/DL (ref 2.5–4.9)
PHOSPHATE SERPL-MCNC: 2 MG/DL (ref 2.5–4.9)
PHOSPHATE SERPL-MCNC: 2 MG/DL (ref 2.5–4.9)
POTASSIUM SERPL-SCNC: 3.4 MMOL/L (ref 3.5–5.1)
POTASSIUM SERPL-SCNC: 3.8 MMOL/L (ref 3.5–5.1)
POTASSIUM SERPL-SCNC: 3.8 MMOL/L (ref 3.5–5.1)
POTASSIUM SERPL-SCNC: 3.9 MMOL/L (ref 3.5–5.1)
SODIUM SERPL-SCNC: 130 MMOL/L (ref 136–145)
SODIUM SERPL-SCNC: 130 MMOL/L (ref 136–145)
SODIUM SERPL-SCNC: 131 MMOL/L (ref 136–145)
SODIUM SERPL-SCNC: 131 MMOL/L (ref 136–145)

## 2024-12-01 PROCEDURE — 2000000000 HC ICU R&B

## 2024-12-01 PROCEDURE — 3E1M39Z IRRIGATION OF PERITONEAL CAVITY USING DIALYSATE, PERCUTANEOUS APPROACH: ICD-10-PCS | Performed by: STUDENT IN AN ORGANIZED HEALTH CARE EDUCATION/TRAINING PROGRAM

## 2024-12-01 PROCEDURE — 99232 SBSQ HOSP IP/OBS MODERATE 35: CPT | Performed by: INTERNAL MEDICINE

## 2024-12-01 PROCEDURE — 2580000003 HC RX 258: Performed by: STUDENT IN AN ORGANIZED HEALTH CARE EDUCATION/TRAINING PROGRAM

## 2024-12-01 PROCEDURE — 2580000003 HC RX 258: Performed by: INTERNAL MEDICINE

## 2024-12-01 PROCEDURE — 2700000000 HC OXYGEN THERAPY PER DAY

## 2024-12-01 PROCEDURE — 6370000000 HC RX 637 (ALT 250 FOR IP): Performed by: STUDENT IN AN ORGANIZED HEALTH CARE EDUCATION/TRAINING PROGRAM

## 2024-12-01 PROCEDURE — 94003 VENT MGMT INPAT SUBQ DAY: CPT

## 2024-12-01 PROCEDURE — 2720000010 HC SURG SUPPLY STERILE

## 2024-12-01 PROCEDURE — 36415 COLL VENOUS BLD VENIPUNCTURE: CPT

## 2024-12-01 PROCEDURE — 84100 ASSAY OF PHOSPHORUS: CPT

## 2024-12-01 PROCEDURE — 80048 BASIC METABOLIC PNL TOTAL CA: CPT

## 2024-12-01 PROCEDURE — 6360000002 HC RX W HCPCS: Performed by: INTERNAL MEDICINE

## 2024-12-01 PROCEDURE — 89220 SPUTUM SPECIMEN COLLECTION: CPT

## 2024-12-01 PROCEDURE — P9047 ALBUMIN (HUMAN), 25%, 50ML: HCPCS | Performed by: INTERNAL MEDICINE

## 2024-12-01 PROCEDURE — 36592 COLLECT BLOOD FROM PICC: CPT

## 2024-12-01 PROCEDURE — 94761 N-INVAS EAR/PLS OXIMETRY MLT: CPT

## 2024-12-01 PROCEDURE — 6360000002 HC RX W HCPCS: Performed by: STUDENT IN AN ORGANIZED HEALTH CARE EDUCATION/TRAINING PROGRAM

## 2024-12-01 PROCEDURE — 83735 ASSAY OF MAGNESIUM: CPT

## 2024-12-01 PROCEDURE — 90945 DIALYSIS ONE EVALUATION: CPT

## 2024-12-01 PROCEDURE — 2500000003 HC RX 250 WO HCPCS: Performed by: INTERNAL MEDICINE

## 2024-12-01 PROCEDURE — 82330 ASSAY OF CALCIUM: CPT

## 2024-12-01 RX ORDER — ALPRAZOLAM 0.5 MG
0.5 TABLET ORAL 4 TIMES DAILY PRN
Status: DISCONTINUED | OUTPATIENT
Start: 2024-12-01 | End: 2024-12-09 | Stop reason: HOSPADM

## 2024-12-01 RX ORDER — OXYCODONE HYDROCHLORIDE 5 MG/1
5 TABLET ORAL EVERY 4 HOURS PRN
Status: DISCONTINUED | OUTPATIENT
Start: 2024-12-01 | End: 2024-12-02

## 2024-12-01 RX ADMIN — HEPARIN SODIUM 5000 UNITS: 5000 INJECTION INTRAVENOUS; SUBCUTANEOUS at 22:04

## 2024-12-01 RX ADMIN — SUCRALFATE 1 G: 1 TABLET ORAL at 06:22

## 2024-12-01 RX ADMIN — SUCRALFATE 1 G: 1 TABLET ORAL at 14:42

## 2024-12-01 RX ADMIN — POTASSIUM CHLORIDE 20 MEQ: 29.8 INJECTION, SOLUTION INTRAVENOUS at 08:25

## 2024-12-01 RX ADMIN — HEPARIN SODIUM 5000 UNITS: 5000 INJECTION INTRAVENOUS; SUBCUTANEOUS at 14:42

## 2024-12-01 RX ADMIN — ATORVASTATIN CALCIUM 40 MG: 40 TABLET, FILM COATED ORAL at 21:17

## 2024-12-01 RX ADMIN — ALPRAZOLAM 0.5 MG: 0.5 TABLET ORAL at 11:40

## 2024-12-01 RX ADMIN — MICONAZOLE NITRATE: 2 POWDER TOPICAL at 21:00

## 2024-12-01 RX ADMIN — LANSOPRAZOLE 30 MG: 30 TABLET, ORALLY DISINTEGRATING ORAL at 06:22

## 2024-12-01 RX ADMIN — SODIUM PHOSPHATE, MONOBASIC, MONOHYDRATE AND SODIUM PHOSPHATE, DIBASIC, ANHYDROUS 12 MMOL: 142; 276 INJECTION, SOLUTION INTRAVENOUS at 05:28

## 2024-12-01 RX ADMIN — MIDODRINE HYDROCHLORIDE 10 MG: 5 TABLET ORAL at 11:40

## 2024-12-01 RX ADMIN — SUCRALFATE 1 G: 1 TABLET ORAL at 22:05

## 2024-12-01 RX ADMIN — LACTULOSE 20 G: 20 SOLUTION ORAL at 08:35

## 2024-12-01 RX ADMIN — OXYCODONE HYDROCHLORIDE 5 MG: 5 TABLET ORAL at 14:42

## 2024-12-01 RX ADMIN — RIFAXIMIN 550 MG: 550 TABLET ORAL at 09:17

## 2024-12-01 RX ADMIN — SODIUM CHLORIDE, PRESERVATIVE FREE 10 ML: 5 INJECTION INTRAVENOUS at 08:36

## 2024-12-01 RX ADMIN — SODIUM CHLORIDE, PRESERVATIVE FREE 10 ML: 5 INJECTION INTRAVENOUS at 21:00

## 2024-12-01 RX ADMIN — Medication 1000 ML/HR: at 13:30

## 2024-12-01 RX ADMIN — ALBUMIN (HUMAN) 25 G: 0.25 INJECTION, SOLUTION INTRAVENOUS at 09:30

## 2024-12-01 RX ADMIN — LACTULOSE 20 G: 20 SOLUTION ORAL at 14:42

## 2024-12-01 RX ADMIN — GABAPENTIN 100 MG: 100 CAPSULE ORAL at 08:35

## 2024-12-01 RX ADMIN — LACTULOSE 20 G: 20 SOLUTION ORAL at 21:17

## 2024-12-01 RX ADMIN — Medication: at 13:30

## 2024-12-01 RX ADMIN — MIDODRINE HYDROCHLORIDE 10 MG: 5 TABLET ORAL at 20:15

## 2024-12-01 RX ADMIN — RIFAXIMIN 550 MG: 550 TABLET ORAL at 21:18

## 2024-12-01 RX ADMIN — MICONAZOLE NITRATE: 2 POWDER TOPICAL at 08:37

## 2024-12-01 RX ADMIN — OXYCODONE HYDROCHLORIDE 5 MG: 5 TABLET ORAL at 21:17

## 2024-12-01 RX ADMIN — ALPRAZOLAM 0.5 MG: 0.5 TABLET ORAL at 17:45

## 2024-12-01 RX ADMIN — ALPRAZOLAM 0.5 MG: 0.5 TABLET ORAL at 06:22

## 2024-12-01 RX ADMIN — SODIUM PHOSPHATE, MONOBASIC, MONOHYDRATE AND SODIUM PHOSPHATE, DIBASIC, ANHYDROUS 6 MMOL: 142; 276 INJECTION, SOLUTION INTRAVENOUS at 20:12

## 2024-12-01 RX ADMIN — POTASSIUM CHLORIDE 20 MEQ: 29.8 INJECTION, SOLUTION INTRAVENOUS at 07:40

## 2024-12-01 RX ADMIN — HEPARIN SODIUM 5000 UNITS: 5000 INJECTION INTRAVENOUS; SUBCUTANEOUS at 06:22

## 2024-12-01 RX ADMIN — TRAZODONE HYDROCHLORIDE 50 MG: 50 TABLET ORAL at 21:17

## 2024-12-01 RX ADMIN — OXYCODONE HYDROCHLORIDE 5 MG: 5 TABLET ORAL at 03:15

## 2024-12-01 RX ADMIN — MIDODRINE HYDROCHLORIDE 10 MG: 5 TABLET ORAL at 03:15

## 2024-12-01 ASSESSMENT — PAIN SCALES - WONG BAKER

## 2024-12-01 ASSESSMENT — PULMONARY FUNCTION TESTS
PIF_VALUE: 16
PIF_VALUE: 15
PIF_VALUE: 16
PIF_VALUE: 15
PIF_VALUE: 16
PIF_VALUE: 15
PIF_VALUE: 16
PIF_VALUE: 15
PIF_VALUE: 16
PIF_VALUE: 16
PIF_VALUE: 15

## 2024-12-01 ASSESSMENT — PAIN SCALES - GENERAL
PAINLEVEL_OUTOF10: 9
PAINLEVEL_OUTOF10: 2
PAINLEVEL_OUTOF10: 8
PAINLEVEL_OUTOF10: 8
PAINLEVEL_OUTOF10: 9
PAINLEVEL_OUTOF10: 9
PAINLEVEL_OUTOF10: 10
PAINLEVEL_OUTOF10: 8

## 2024-12-01 ASSESSMENT — PAIN DESCRIPTION - ORIENTATION
ORIENTATION: MID;LOWER
ORIENTATION: MID;LOWER
ORIENTATION: LOWER;MID

## 2024-12-01 ASSESSMENT — PAIN DESCRIPTION - LOCATION
LOCATION: BACK

## 2024-12-01 ASSESSMENT — PAIN DESCRIPTION - PAIN TYPE: TYPE: CHRONIC PAIN

## 2024-12-01 ASSESSMENT — PAIN DESCRIPTION - DESCRIPTORS
DESCRIPTORS: ACHING;DISCOMFORT
DESCRIPTORS: DISCOMFORT
DESCRIPTORS: ACHING;DISCOMFORT

## 2024-12-01 ASSESSMENT — PAIN - FUNCTIONAL ASSESSMENT: PAIN_FUNCTIONAL_ASSESSMENT: ACTIVITIES ARE NOT PREVENTED

## 2024-12-01 ASSESSMENT — PAIN DESCRIPTION - FREQUENCY: FREQUENCY: CONTINUOUS

## 2024-12-01 ASSESSMENT — PAIN DESCRIPTION - ONSET: ONSET: ON-GOING

## 2024-12-01 NOTE — PLAN OF CARE
Problem: Chronic Conditions and Co-morbidities  Goal: Patient's chronic conditions and co-morbidity symptoms are monitored and maintained or improved  Outcome: Progressing     Problem: Discharge Planning  Goal: Discharge to home or other facility with appropriate resources  Outcome: Progressing     Problem: Pain  Goal: Verbalizes/displays adequate comfort level or baseline comfort level  Outcome: Progressing     Problem: Safety - Adult  Goal: Free from fall injury  Outcome: Progressing     Problem: Nutrition Deficit:  Goal: Optimize nutritional status  Outcome: Progressing     Problem: Skin/Tissue Integrity  Goal: Absence of new skin breakdown  Description: 1.  Monitor for areas of redness and/or skin breakdown  2.  Assess vascular access sites hourly  3.  Every 4-6 hours minimum:  Change oxygen saturation probe site  4.  Every 4-6 hours:  If on nasal continuous positive airway pressure, respiratory therapy assess nares and determine need for appliance change or resting period.  Outcome: Progressing

## 2024-12-02 ENCOUNTER — APPOINTMENT (OUTPATIENT)
Dept: NON INVASIVE DIAGNOSTICS | Age: 64
DRG: 432 | End: 2024-12-02
Attending: STUDENT IN AN ORGANIZED HEALTH CARE EDUCATION/TRAINING PROGRAM
Payer: MEDICARE

## 2024-12-02 ENCOUNTER — APPOINTMENT (OUTPATIENT)
Dept: INTERVENTIONAL RADIOLOGY/VASCULAR | Age: 64
DRG: 432 | End: 2024-12-02
Payer: MEDICARE

## 2024-12-02 LAB
ANION GAP SERPL CALCULATED.3IONS-SCNC: 4 MMOL/L (ref 9–17)
ANION GAP SERPL CALCULATED.3IONS-SCNC: 5 MMOL/L (ref 9–17)
APPEARANCE FLD: CLEAR
BLASTS NFR FLD: 0 %
BODY FLD TYPE: NORMAL
BUN SERPL-MCNC: 10 MG/DL (ref 7–20)
BUN SERPL-MCNC: 11 MG/DL (ref 7–20)
BUN SERPL-MCNC: 11 MG/DL (ref 7–20)
BUN SERPL-MCNC: 12 MG/DL (ref 7–20)
CA-I BLD-SCNC: 1.47 MMOL/L (ref 1.15–1.33)
CA-I BLD-SCNC: 1.49 MMOL/L (ref 1.15–1.33)
CALCIUM SERPL-MCNC: 10.1 MG/DL (ref 8.3–10.6)
CALCIUM SERPL-MCNC: 9.3 MG/DL (ref 8.3–10.6)
CALCIUM SERPL-MCNC: 9.8 MG/DL (ref 8.3–10.6)
CALCIUM SERPL-MCNC: 9.8 MG/DL (ref 8.3–10.6)
CHLORIDE SERPL-SCNC: 100 MMOL/L (ref 99–110)
CHLORIDE SERPL-SCNC: 101 MMOL/L (ref 99–110)
CHLORIDE SERPL-SCNC: 98 MMOL/L (ref 99–110)
CHLORIDE SERPL-SCNC: 98 MMOL/L (ref 99–110)
CLOT CHECK: NORMAL
CO2 SERPL-SCNC: 27 MMOL/L (ref 21–32)
CO2 SERPL-SCNC: 28 MMOL/L (ref 21–32)
CO2 SERPL-SCNC: 28 MMOL/L (ref 21–32)
CO2 SERPL-SCNC: 29 MMOL/L (ref 21–32)
COLOR FLD: YELLOW
CREAT SERPL-MCNC: 1.4 MG/DL (ref 0.8–1.3)
CREAT SERPL-MCNC: 1.5 MG/DL (ref 0.8–1.3)
ECHO AO ROOT DIAM: 3.5 CM
ECHO AO ROOT INDEX: 1.61 CM/M2
ECHO AV AREA PEAK VELOCITY: 1 CM2
ECHO AV AREA VTI: 1.2 CM2
ECHO AV AREA/BSA PEAK VELOCITY: 0.5 CM2/M2
ECHO AV AREA/BSA VTI: 0.6 CM2/M2
ECHO AV MEAN GRADIENT: 24 MMHG
ECHO AV MEAN VELOCITY: 2.3 M/S
ECHO AV PEAK GRADIENT: 45 MMHG
ECHO AV PEAK VELOCITY: 3.4 M/S
ECHO AV VELOCITY RATIO: 0.29
ECHO AV VTI: 64.4 CM
ECHO LA AREA 4C: 28.1 CM2
ECHO LA DIAMETER INDEX: 1.88 CM/M2
ECHO LA DIAMETER: 4.1 CM
ECHO LA MAJOR AXIS: 6.6 CM
ECHO LA TO AORTIC ROOT RATIO: 1.17
ECHO LA VOL MOD A4C: 99 ML (ref 18–58)
ECHO LA VOLUME INDEX MOD A4C: 45 ML/M2 (ref 16–34)
ECHO LV E' LATERAL VELOCITY: 10.2 CM/S
ECHO LV E' SEPTAL VELOCITY: 5.8 CM/S
ECHO LV EDV A4C: 239 ML
ECHO LV EDV INDEX A4C: 110 ML/M2
ECHO LV EF PHYSICIAN: 40 %
ECHO LV EJECTION FRACTION A4C: 46 %
ECHO LV ESV A4C: 129 ML
ECHO LV ESV INDEX A4C: 59 ML/M2
ECHO LV FRACTIONAL SHORTENING: 31 % (ref 28–44)
ECHO LV INTERNAL DIMENSION DIASTOLE INDEX: 2.71 CM/M2
ECHO LV INTERNAL DIMENSION DIASTOLIC: 5.9 CM (ref 4.2–5.9)
ECHO LV INTERNAL DIMENSION SYSTOLIC INDEX: 1.88 CM/M2
ECHO LV INTERNAL DIMENSION SYSTOLIC: 4.1 CM
ECHO LV IVSD: 1 CM (ref 0.6–1)
ECHO LV MASS 2D: 255.7 G (ref 88–224)
ECHO LV MASS INDEX 2D: 117.3 G/M2 (ref 49–115)
ECHO LV POSTERIOR WALL DIASTOLIC: 1.1 CM (ref 0.6–1)
ECHO LV RELATIVE WALL THICKNESS RATIO: 0.37
ECHO LVOT AREA: 3.5 CM2
ECHO LVOT AV VTI INDEX: 0.34
ECHO LVOT DIAM: 2.1 CM
ECHO LVOT MEAN GRADIENT: 2 MMHG
ECHO LVOT PEAK GRADIENT: 4 MMHG
ECHO LVOT PEAK VELOCITY: 1 M/S
ECHO LVOT STROKE VOLUME INDEX: 34.6 ML/M2
ECHO LVOT SV: 75.5 ML
ECHO LVOT VTI: 21.8 CM
ECHO MV A VELOCITY: 1.59 M/S
ECHO MV AREA VTI: 1.4 CM2
ECHO MV E DECELERATION TIME (DT): 321 MS
ECHO MV E VELOCITY: 1.36 M/S
ECHO MV E/A RATIO: 0.86
ECHO MV E/E' LATERAL: 13.33
ECHO MV E/E' RATIO (AVERAGED): 18.39
ECHO MV E/E' SEPTAL: 23.45
ECHO MV LVOT VTI INDEX: 2.45
ECHO MV MAX VELOCITY: 1.6 M/S
ECHO MV MEAN GRADIENT: 5 MMHG
ECHO MV MEAN VELOCITY: 1.1 M/S
ECHO MV PEAK GRADIENT: 10 MMHG
ECHO MV VTI: 53.5 CM
ECHO RV MID DIMENSION: 3.9 CM
ECHO TV REGURGITANT MAX VELOCITY: 2.21 M/S
ECHO TV REGURGITANT PEAK GRADIENT: 20 MMHG
EOSINOPHIL NFR FLD: 0 %
GFR, ESTIMATED: 49 ML/MIN/1.73M2
GFR, ESTIMATED: 51 ML/MIN/1.73M2
GFR, ESTIMATED: 52 ML/MIN/1.73M2
GFR, ESTIMATED: 52 ML/MIN/1.73M2
GLUCOSE SERPL-MCNC: 100 MG/DL (ref 74–99)
GLUCOSE SERPL-MCNC: 91 MG/DL (ref 74–99)
GLUCOSE SERPL-MCNC: 96 MG/DL (ref 74–99)
GLUCOSE SERPL-MCNC: 99 MG/DL (ref 74–99)
LYMPHOCYTES NFR FLD: 56 %
MAGNESIUM SERPL-MCNC: 2.2 MG/DL (ref 1.8–2.4)
MAGNESIUM SERPL-MCNC: 2.3 MG/DL (ref 1.8–2.4)
MAGNESIUM SERPL-MCNC: 2.4 MG/DL (ref 1.8–2.4)
MESOTHELIAL CELLS BODY FLUID: 8 %
MONOCYTES NFR FLD: 28 %
NEUTROPHILS NFR FLD: 8 %
PHOSPHATE SERPL-MCNC: 2 MG/DL (ref 2.5–4.9)
PHOSPHATE SERPL-MCNC: 2.2 MG/DL (ref 2.5–4.9)
PHOSPHATE SERPL-MCNC: 2.4 MG/DL (ref 2.5–4.9)
PHOSPHATE SERPL-MCNC: 2.8 MG/DL (ref 2.5–4.9)
POTASSIUM SERPL-SCNC: 3.5 MMOL/L (ref 3.5–5.1)
POTASSIUM SERPL-SCNC: 3.6 MMOL/L (ref 3.5–5.1)
POTASSIUM SERPL-SCNC: 3.6 MMOL/L (ref 3.5–5.1)
POTASSIUM SERPL-SCNC: 3.7 MMOL/L (ref 3.5–5.1)
RBC # FLD: 40 CELLS/UL
SODIUM SERPL-SCNC: 131 MMOL/L (ref 136–145)
SODIUM SERPL-SCNC: 131 MMOL/L (ref 136–145)
SODIUM SERPL-SCNC: 132 MMOL/L (ref 136–145)
SODIUM SERPL-SCNC: 132 MMOL/L (ref 136–145)
WBC # FLD: 43 CELLS/UL

## 2024-12-02 PROCEDURE — 6360000002 HC RX W HCPCS: Performed by: REGISTERED NURSE

## 2024-12-02 PROCEDURE — 82330 ASSAY OF CALCIUM: CPT

## 2024-12-02 PROCEDURE — 89051 BODY FLUID CELL COUNT: CPT

## 2024-12-02 PROCEDURE — 2580000003 HC RX 258: Performed by: STUDENT IN AN ORGANIZED HEALTH CARE EDUCATION/TRAINING PROGRAM

## 2024-12-02 PROCEDURE — 6370000000 HC RX 637 (ALT 250 FOR IP): Performed by: REGISTERED NURSE

## 2024-12-02 PROCEDURE — 2580000003 HC RX 258: Performed by: SPECIALIST

## 2024-12-02 PROCEDURE — 2580000003 HC RX 258: Performed by: INTERNAL MEDICINE

## 2024-12-02 PROCEDURE — 2500000003 HC RX 250 WO HCPCS: Performed by: INTERNAL MEDICINE

## 2024-12-02 PROCEDURE — P9047 ALBUMIN (HUMAN), 25%, 50ML: HCPCS | Performed by: INTERNAL MEDICINE

## 2024-12-02 PROCEDURE — 87070 CULTURE OTHR SPECIMN AEROBIC: CPT

## 2024-12-02 PROCEDURE — 83735 ASSAY OF MAGNESIUM: CPT

## 2024-12-02 PROCEDURE — 6370000000 HC RX 637 (ALT 250 FOR IP): Performed by: STUDENT IN AN ORGANIZED HEALTH CARE EDUCATION/TRAINING PROGRAM

## 2024-12-02 PROCEDURE — 94003 VENT MGMT INPAT SUBQ DAY: CPT

## 2024-12-02 PROCEDURE — 2700000000 HC OXYGEN THERAPY PER DAY

## 2024-12-02 PROCEDURE — 90945 DIALYSIS ONE EVALUATION: CPT

## 2024-12-02 PROCEDURE — 2000000000 HC ICU R&B

## 2024-12-02 PROCEDURE — 82042 OTHER SOURCE ALBUMIN QUAN EA: CPT

## 2024-12-02 PROCEDURE — P9047 ALBUMIN (HUMAN), 25%, 50ML: HCPCS | Performed by: REGISTERED NURSE

## 2024-12-02 PROCEDURE — 83615 LACTATE (LD) (LDH) ENZYME: CPT

## 2024-12-02 PROCEDURE — 6360000002 HC RX W HCPCS: Performed by: STUDENT IN AN ORGANIZED HEALTH CARE EDUCATION/TRAINING PROGRAM

## 2024-12-02 PROCEDURE — 93306 TTE W/DOPPLER COMPLETE: CPT

## 2024-12-02 PROCEDURE — 36415 COLL VENOUS BLD VENIPUNCTURE: CPT

## 2024-12-02 PROCEDURE — 93306 TTE W/DOPPLER COMPLETE: CPT | Performed by: INTERNAL MEDICINE

## 2024-12-02 PROCEDURE — 2720000010 HC SURG SUPPLY STERILE

## 2024-12-02 PROCEDURE — 80048 BASIC METABOLIC PNL TOTAL CA: CPT

## 2024-12-02 PROCEDURE — 89220 SPUTUM SPECIMEN COLLECTION: CPT

## 2024-12-02 PROCEDURE — 99232 SBSQ HOSP IP/OBS MODERATE 35: CPT | Performed by: INTERNAL MEDICINE

## 2024-12-02 PROCEDURE — 87075 CULTR BACTERIA EXCEPT BLOOD: CPT

## 2024-12-02 PROCEDURE — 87205 SMEAR GRAM STAIN: CPT

## 2024-12-02 PROCEDURE — 84157 ASSAY OF PROTEIN OTHER: CPT

## 2024-12-02 PROCEDURE — 36592 COLLECT BLOOD FROM PICC: CPT

## 2024-12-02 PROCEDURE — 6360000002 HC RX W HCPCS: Performed by: INTERNAL MEDICINE

## 2024-12-02 PROCEDURE — 94761 N-INVAS EAR/PLS OXIMETRY MLT: CPT

## 2024-12-02 PROCEDURE — 84100 ASSAY OF PHOSPHORUS: CPT

## 2024-12-02 PROCEDURE — 6360000002 HC RX W HCPCS: Performed by: SPECIALIST

## 2024-12-02 PROCEDURE — 82945 GLUCOSE OTHER FLUID: CPT

## 2024-12-02 RX ORDER — CARBOXYMETHYLCELLULOSE SODIUM 10 MG/ML
1 GEL OPHTHALMIC
Status: DISCONTINUED | OUTPATIENT
Start: 2024-12-02 | End: 2024-12-09 | Stop reason: HOSPADM

## 2024-12-02 RX ORDER — ALBUMIN (HUMAN) 12.5 G/50ML
25 SOLUTION INTRAVENOUS EVERY 6 HOURS
Status: COMPLETED | OUTPATIENT
Start: 2024-12-02 | End: 2024-12-03

## 2024-12-02 RX ORDER — HYDROMORPHONE HYDROCHLORIDE 1 MG/ML
0.5 INJECTION, SOLUTION INTRAMUSCULAR; INTRAVENOUS; SUBCUTANEOUS
Status: DISCONTINUED | OUTPATIENT
Start: 2024-12-02 | End: 2024-12-06

## 2024-12-02 RX ORDER — OXYCODONE HYDROCHLORIDE 5 MG/1
5 TABLET ORAL EVERY 4 HOURS PRN
Status: DISPENSED | OUTPATIENT
Start: 2024-12-02 | End: 2024-12-03

## 2024-12-02 RX ADMIN — Medication: at 05:14

## 2024-12-02 RX ADMIN — MIDODRINE HYDROCHLORIDE 10 MG: 5 TABLET ORAL at 20:09

## 2024-12-02 RX ADMIN — LACTULOSE 20 G: 20 SOLUTION ORAL at 08:21

## 2024-12-02 RX ADMIN — SUCRALFATE 1 G: 1 TABLET ORAL at 22:07

## 2024-12-02 RX ADMIN — OXYCODONE HYDROCHLORIDE 5 MG: 5 TABLET ORAL at 10:23

## 2024-12-02 RX ADMIN — SUCRALFATE 1 G: 1 TABLET ORAL at 05:48

## 2024-12-02 RX ADMIN — SODIUM PHOSPHATE, MONOBASIC, MONOHYDRATE AND SODIUM PHOSPHATE, DIBASIC, ANHYDROUS 12 MMOL: 142; 276 INJECTION, SOLUTION INTRAVENOUS at 03:07

## 2024-12-02 RX ADMIN — ALPRAZOLAM 0.5 MG: 0.5 TABLET ORAL at 08:21

## 2024-12-02 RX ADMIN — TRAZODONE HYDROCHLORIDE 50 MG: 50 TABLET ORAL at 21:09

## 2024-12-02 RX ADMIN — SUCRALFATE 1 G: 1 TABLET ORAL at 13:31

## 2024-12-02 RX ADMIN — Medication: at 00:37

## 2024-12-02 RX ADMIN — SODIUM CHLORIDE, PRESERVATIVE FREE 10 ML: 5 INJECTION INTRAVENOUS at 21:13

## 2024-12-02 RX ADMIN — ALBUMIN (HUMAN) 25 G: 0.25 INJECTION, SOLUTION INTRAVENOUS at 08:24

## 2024-12-02 RX ADMIN — ALPRAZOLAM 0.5 MG: 0.5 TABLET ORAL at 00:55

## 2024-12-02 RX ADMIN — MIDODRINE HYDROCHLORIDE 10 MG: 5 TABLET ORAL at 10:23

## 2024-12-02 RX ADMIN — SODIUM CHLORIDE, PRESERVATIVE FREE 10 ML: 5 INJECTION INTRAVENOUS at 08:39

## 2024-12-02 RX ADMIN — RIFAXIMIN 550 MG: 550 TABLET ORAL at 21:09

## 2024-12-02 RX ADMIN — OXYCODONE HYDROCHLORIDE 5 MG: 5 TABLET ORAL at 14:53

## 2024-12-02 RX ADMIN — SODIUM PHOSPHATE, MONOBASIC, MONOHYDRATE AND SODIUM PHOSPHATE, DIBASIC, ANHYDROUS 6 MMOL: 142; 276 INJECTION, SOLUTION INTRAVENOUS at 11:22

## 2024-12-02 RX ADMIN — ALPRAZOLAM 0.5 MG: 0.5 TABLET ORAL at 14:20

## 2024-12-02 RX ADMIN — OXYCODONE HYDROCHLORIDE 5 MG: 5 TABLET ORAL at 05:49

## 2024-12-02 RX ADMIN — ALBUMIN (HUMAN) 25 G: 0.25 INJECTION, SOLUTION INTRAVENOUS at 18:48

## 2024-12-02 RX ADMIN — HEPARIN SODIUM 5000 UNITS: 5000 INJECTION INTRAVENOUS; SUBCUTANEOUS at 05:49

## 2024-12-02 RX ADMIN — OXYCODONE HYDROCHLORIDE 5 MG: 5 TABLET ORAL at 01:20

## 2024-12-02 RX ADMIN — RIFAXIMIN 550 MG: 550 TABLET ORAL at 08:21

## 2024-12-02 RX ADMIN — HEPARIN SODIUM: 1000 INJECTION INTRAVENOUS; SUBCUTANEOUS at 12:22

## 2024-12-02 RX ADMIN — MICONAZOLE NITRATE: 2 POWDER TOPICAL at 21:12

## 2024-12-02 RX ADMIN — GABAPENTIN 100 MG: 100 CAPSULE ORAL at 08:21

## 2024-12-02 RX ADMIN — LANSOPRAZOLE 30 MG: 30 TABLET, ORALLY DISINTEGRATING ORAL at 05:49

## 2024-12-02 RX ADMIN — MICONAZOLE NITRATE: 2 POWDER TOPICAL at 08:21

## 2024-12-02 RX ADMIN — Medication: at 05:15

## 2024-12-02 RX ADMIN — HEPARIN SODIUM 5000 UNITS: 5000 INJECTION INTRAVENOUS; SUBCUTANEOUS at 13:31

## 2024-12-02 RX ADMIN — OXYCODONE HYDROCHLORIDE 5 MG: 5 TABLET ORAL at 20:12

## 2024-12-02 RX ADMIN — HEPARIN SODIUM 5000 UNITS: 5000 INJECTION INTRAVENOUS; SUBCUTANEOUS at 22:07

## 2024-12-02 RX ADMIN — LACTULOSE 20 G: 20 SOLUTION ORAL at 13:30

## 2024-12-02 RX ADMIN — MELATONIN TAB 3 MG 3 MG: 3 TAB at 01:22

## 2024-12-02 RX ADMIN — ALPRAZOLAM 0.5 MG: 0.5 TABLET ORAL at 21:09

## 2024-12-02 RX ADMIN — HYDROMORPHONE HYDROCHLORIDE 0.5 MG: 1 INJECTION, SOLUTION INTRAMUSCULAR; INTRAVENOUS; SUBCUTANEOUS at 18:31

## 2024-12-02 RX ADMIN — ATORVASTATIN CALCIUM 40 MG: 40 TABLET, FILM COATED ORAL at 21:09

## 2024-12-02 RX ADMIN — MIDODRINE HYDROCHLORIDE 10 MG: 5 TABLET ORAL at 04:04

## 2024-12-02 RX ADMIN — Medication: at 02:30

## 2024-12-02 RX ADMIN — LACTULOSE 20 G: 20 SOLUTION ORAL at 21:09

## 2024-12-02 RX ADMIN — SODIUM CHLORIDE, PRESERVATIVE FREE 10 ML: 5 INJECTION INTRAVENOUS at 21:14

## 2024-12-02 RX ADMIN — ALBUMIN (HUMAN) 25 G: 0.25 INJECTION, SOLUTION INTRAVENOUS at 13:06

## 2024-12-02 ASSESSMENT — PULMONARY FUNCTION TESTS
PIF_VALUE: 16
PIF_VALUE: 16
PIF_VALUE: 15
PIF_VALUE: 16
PIF_VALUE: 15
PIF_VALUE: 16
PIF_VALUE: 15
PIF_VALUE: 16
PIF_VALUE: 15
PIF_VALUE: 16
PIF_VALUE: 15
PIF_VALUE: 16

## 2024-12-02 ASSESSMENT — PAIN DESCRIPTION - LOCATION
LOCATION: BACK
LOCATION: GENERALIZED
LOCATION: GENERALIZED
LOCATION: BACK

## 2024-12-02 ASSESSMENT — PAIN SCALES - GENERAL
PAINLEVEL_OUTOF10: 7
PAINLEVEL_OUTOF10: 6
PAINLEVEL_OUTOF10: 8
PAINLEVEL_OUTOF10: 9
PAINLEVEL_OUTOF10: 8
PAINLEVEL_OUTOF10: 9
PAINLEVEL_OUTOF10: 7
PAINLEVEL_OUTOF10: 6
PAINLEVEL_OUTOF10: 8
PAINLEVEL_OUTOF10: 10
PAINLEVEL_OUTOF10: 8
PAINLEVEL_OUTOF10: 9

## 2024-12-02 ASSESSMENT — PAIN DESCRIPTION - ORIENTATION
ORIENTATION: MID;LOWER

## 2024-12-02 ASSESSMENT — PAIN DESCRIPTION - DESCRIPTORS
DESCRIPTORS: ACHING
DESCRIPTORS: ACHING
DESCRIPTORS: ACHING;DISCOMFORT
DESCRIPTORS: ACHING
DESCRIPTORS: DISCOMFORT

## 2024-12-02 ASSESSMENT — PAIN - FUNCTIONAL ASSESSMENT: PAIN_FUNCTIONAL_ASSESSMENT: ACTIVITIES ARE NOT PREVENTED

## 2024-12-02 NOTE — PROCEDURES
PROCEDURE NOTE  Date: 12/2/2024   Name: Tico Day  YOB: 1960    Paracentesis    Date/Time: 12/2/2024 12:18 PM    Performed by: Amado Chiu APRN - CNP  Authorized by: Amado Chiu APRN - CNP  Consent: Verbal consent obtained.  Consent given by: patient  Patient understanding: patient states understanding of the procedure being performed  Site marked: the operative site was marked  Imaging studies: imaging studies available  Patient identity confirmed: verbally with patient  Time out: Immediately prior to procedure a \"time out\" was called to verify the correct patient, procedure, equipment, support staff and site/side marked as required.  Initial or subsequent exam: initial  Procedure purpose: diagnostic and therapeutic  Indications: abdominal discomfort secondary to ascites, new onset ascites and suspected peritonitis  Anesthesia: local infiltration    Anesthesia:  Local Anesthetic: lidocaine 1% without epinephrine    Sedation:  Patient sedated: no    Preparation: Patient was prepped and draped in the usual sterile fashion.  Needle gauge: 20  Ultrasound guidance: yes  Puncture site: right lower quadrant  Fluid removed: 6000(ml)  Fluid appearance: bilious  Dressing: 4x4 sterile gauze  Patient tolerance: patient tolerated the procedure well with no immediate complications

## 2024-12-02 NOTE — CARE COORDINATION
Chart reviewed. Cm met with patient at bedside but he mouths to call his wife. He has a trach. Cm called the wife Alesia. She said the patient has had his trach for about 4 months. She said he has been in the hospital so much she does not remember. She said he is at [facility] and on HD MWF. She says his plan is to return to complete his skilled care and then his long term plan is home. She said they are trying to address the trach and get him home. Emotional support provided.    The patient has a PCP and insurance and can afford and take his medications as prescribed. The patient has reliable transportation with the facility.     Cm faxed information to [facility]. Cm is following.       12/02/24 5409   Service Assessment   Patient Orientation Alert and Oriented   Cognition Alert   History Provided By Medical Record;Spouse   Primary Caregiver Other (Comment)   Support Systems Spouse/Significant Other   Patient's Healthcare Decision Maker is: Legal Next of Kin   PCP Verified by CM Yes   Prior Functional Level Assistance with the following:;Bathing;Dressing;Toileting;Feeding;Cooking;Housework;Shopping;Mobility   Current Functional Level Assistance with the following:;Other (see comment)  (per hospital policy)   Can patient return to prior living arrangement Yes   Ability to make needs known: Fair   Family able to assist with home care needs: No   Would you like for me to discuss the discharge plan with any other family members/significant others, and if so, who? Yes  (talked to wife Alesia)   Financial Resources Medicare   Community Resources ECF/Home Care   CM/SW Referral Other (see comment)  (helping with coordinating back to the [facility])   Social/Functional History   Active  No   Condition of Participation: Discharge Planning   The Patient and/or Patient Representative was provided with a Choice of Provider? Patient Representative;Patient   Name of the Patient Representative

## 2024-12-03 PROBLEM — R18.8 ASCITES OF LIVER: Status: ACTIVE | Noted: 2024-12-03

## 2024-12-03 LAB
ALBUMIN FLD-MCNC: 1.3 G/DL
ANION GAP SERPL CALCULATED.3IONS-SCNC: 5 MMOL/L (ref 9–17)
ANION GAP SERPL CALCULATED.3IONS-SCNC: 5 MMOL/L (ref 9–17)
BUN SERPL-MCNC: 10 MG/DL (ref 7–20)
BUN SERPL-MCNC: 9 MG/DL (ref 7–20)
CA-I BLD-SCNC: 1.48 MMOL/L (ref 1.15–1.33)
CA-I BLD-SCNC: 1.53 MMOL/L (ref 1.15–1.33)
CALCIUM SERPL-MCNC: 10.1 MG/DL (ref 8.3–10.6)
CALCIUM SERPL-MCNC: 9.6 MG/DL (ref 8.3–10.6)
CHLORIDE SERPL-SCNC: 102 MMOL/L (ref 99–110)
CHLORIDE SERPL-SCNC: 99 MMOL/L (ref 99–110)
CO2 SERPL-SCNC: 28 MMOL/L (ref 21–32)
CO2 SERPL-SCNC: 28 MMOL/L (ref 21–32)
CREAT SERPL-MCNC: 1.3 MG/DL (ref 0.8–1.3)
CREAT SERPL-MCNC: 1.4 MG/DL (ref 0.8–1.3)
ERYTHROCYTE [DISTWIDTH] IN BLOOD BY AUTOMATED COUNT: 19.2 % (ref 11.7–14.9)
GFR, ESTIMATED: 53 ML/MIN/1.73M2
GFR, ESTIMATED: 55 ML/MIN/1.73M2
GLUCOSE FLD-MCNC: 100 MG/DL
GLUCOSE SERPL-MCNC: 101 MG/DL (ref 74–99)
GLUCOSE SERPL-MCNC: 133 MG/DL (ref 74–99)
HCT VFR BLD AUTO: 24.8 % (ref 42–52)
HGB BLD-MCNC: 7.5 G/DL (ref 13.5–18)
LDH FLD L TO P-CCNC: 53 U/L
MAGNESIUM SERPL-MCNC: 2.2 MG/DL (ref 1.8–2.4)
MAGNESIUM SERPL-MCNC: 2.3 MG/DL (ref 1.8–2.4)
MCH RBC QN AUTO: 29.4 PG (ref 27–31)
MCHC RBC AUTO-ENTMCNC: 30.2 G/DL (ref 32–36)
MCV RBC AUTO: 97.3 FL (ref 78–100)
PHOSPHATE SERPL-MCNC: 2.3 MG/DL (ref 2.5–4.9)
PHOSPHATE SERPL-MCNC: 2.4 MG/DL (ref 2.5–4.9)
PLATELET # BLD AUTO: 105 K/UL (ref 140–440)
PMV BLD AUTO: 8.4 FL (ref 7.5–11.1)
POTASSIUM SERPL-SCNC: 3.5 MMOL/L (ref 3.5–5.1)
POTASSIUM SERPL-SCNC: 3.7 MMOL/L (ref 3.5–5.1)
PROT FLD-MCNC: 3.1 G/DL
RBC # BLD AUTO: 2.55 M/UL (ref 4.6–6.2)
SODIUM SERPL-SCNC: 132 MMOL/L (ref 136–145)
SODIUM SERPL-SCNC: 134 MMOL/L (ref 136–145)
SPECIMEN TYPE: NORMAL
WBC OTHER # BLD: 7.2 K/UL (ref 4–10.5)

## 2024-12-03 PROCEDURE — 90945 DIALYSIS ONE EVALUATION: CPT

## 2024-12-03 PROCEDURE — 6370000000 HC RX 637 (ALT 250 FOR IP): Performed by: STUDENT IN AN ORGANIZED HEALTH CARE EDUCATION/TRAINING PROGRAM

## 2024-12-03 PROCEDURE — 2500000003 HC RX 250 WO HCPCS: Performed by: INTERNAL MEDICINE

## 2024-12-03 PROCEDURE — 2580000003 HC RX 258: Performed by: STUDENT IN AN ORGANIZED HEALTH CARE EDUCATION/TRAINING PROGRAM

## 2024-12-03 PROCEDURE — 6360000002 HC RX W HCPCS: Performed by: INTERNAL MEDICINE

## 2024-12-03 PROCEDURE — 2500000003 HC RX 250 WO HCPCS: Performed by: STUDENT IN AN ORGANIZED HEALTH CARE EDUCATION/TRAINING PROGRAM

## 2024-12-03 PROCEDURE — 82330 ASSAY OF CALCIUM: CPT

## 2024-12-03 PROCEDURE — 2700000000 HC OXYGEN THERAPY PER DAY

## 2024-12-03 PROCEDURE — 83735 ASSAY OF MAGNESIUM: CPT

## 2024-12-03 PROCEDURE — 6360000002 HC RX W HCPCS: Performed by: REGISTERED NURSE

## 2024-12-03 PROCEDURE — 94003 VENT MGMT INPAT SUBQ DAY: CPT

## 2024-12-03 PROCEDURE — 2580000003 HC RX 258: Performed by: INTERNAL MEDICINE

## 2024-12-03 PROCEDURE — P9047 ALBUMIN (HUMAN), 25%, 50ML: HCPCS | Performed by: REGISTERED NURSE

## 2024-12-03 PROCEDURE — 89220 SPUTUM SPECIMEN COLLECTION: CPT

## 2024-12-03 PROCEDURE — 6360000002 HC RX W HCPCS: Performed by: STUDENT IN AN ORGANIZED HEALTH CARE EDUCATION/TRAINING PROGRAM

## 2024-12-03 PROCEDURE — 6360000002 HC RX W HCPCS: Performed by: SPECIALIST

## 2024-12-03 PROCEDURE — APPNB60 APP NON BILLABLE TIME 46-60 MINS: Performed by: LICENSED PRACTICAL NURSE

## 2024-12-03 PROCEDURE — 2580000003 HC RX 258: Performed by: SPECIALIST

## 2024-12-03 PROCEDURE — 84100 ASSAY OF PHOSPHORUS: CPT

## 2024-12-03 PROCEDURE — 2000000000 HC ICU R&B

## 2024-12-03 PROCEDURE — 94761 N-INVAS EAR/PLS OXIMETRY MLT: CPT

## 2024-12-03 PROCEDURE — 99232 SBSQ HOSP IP/OBS MODERATE 35: CPT | Performed by: INTERNAL MEDICINE

## 2024-12-03 PROCEDURE — 80048 BASIC METABOLIC PNL TOTAL CA: CPT

## 2024-12-03 PROCEDURE — 85027 COMPLETE CBC AUTOMATED: CPT

## 2024-12-03 PROCEDURE — 6370000000 HC RX 637 (ALT 250 FOR IP): Performed by: REGISTERED NURSE

## 2024-12-03 PROCEDURE — 2720000010 HC SURG SUPPLY STERILE

## 2024-12-03 RX ORDER — SODIUM CHLORIDE FOR INHALATION 3 %
4 VIAL, NEBULIZER (ML) INHALATION EVERY 4 HOURS
Status: DISPENSED | OUTPATIENT
Start: 2024-12-03 | End: 2024-12-06

## 2024-12-03 RX ORDER — OXYCODONE HYDROCHLORIDE 5 MG/1
5 TABLET ORAL EVERY 4 HOURS PRN
Status: DISCONTINUED | OUTPATIENT
Start: 2024-12-03 | End: 2024-12-06

## 2024-12-03 RX ORDER — ALBUTEROL SULFATE 0.83 MG/ML
2.5 SOLUTION RESPIRATORY (INHALATION) EVERY 4 HOURS
Status: DISPENSED | OUTPATIENT
Start: 2024-12-03 | End: 2024-12-06

## 2024-12-03 RX ADMIN — MIDODRINE HYDROCHLORIDE 10 MG: 5 TABLET ORAL at 03:19

## 2024-12-03 RX ADMIN — SUCRALFATE 1 G: 1 TABLET ORAL at 22:11

## 2024-12-03 RX ADMIN — HEPARIN SODIUM 5000 UNITS: 5000 INJECTION INTRAVENOUS; SUBCUTANEOUS at 13:58

## 2024-12-03 RX ADMIN — EPOETIN ALFA-EPBX 10000 UNITS: 10000 INJECTION, SOLUTION INTRAVENOUS; SUBCUTANEOUS at 11:49

## 2024-12-03 RX ADMIN — ATORVASTATIN CALCIUM 40 MG: 40 TABLET, FILM COATED ORAL at 21:10

## 2024-12-03 RX ADMIN — MIDODRINE HYDROCHLORIDE 10 MG: 5 TABLET ORAL at 11:49

## 2024-12-03 RX ADMIN — SODIUM PHOSPHATE, MONOBASIC, MONOHYDRATE AND SODIUM PHOSPHATE, DIBASIC, ANHYDROUS 6 MMOL: 142; 276 INJECTION, SOLUTION INTRAVENOUS at 08:28

## 2024-12-03 RX ADMIN — ALPRAZOLAM 0.5 MG: 0.5 TABLET ORAL at 15:46

## 2024-12-03 RX ADMIN — ALBUMIN (HUMAN) 25 G: 0.25 INJECTION, SOLUTION INTRAVENOUS at 06:52

## 2024-12-03 RX ADMIN — SODIUM PHOSPHATE, MONOBASIC, MONOHYDRATE AND SODIUM PHOSPHATE, DIBASIC, ANHYDROUS 6 MMOL: 142; 276 INJECTION, SOLUTION INTRAVENOUS at 01:35

## 2024-12-03 RX ADMIN — SUCRALFATE 1 G: 1 TABLET ORAL at 13:58

## 2024-12-03 RX ADMIN — Medication: at 23:50

## 2024-12-03 RX ADMIN — OXYCODONE HYDROCHLORIDE 5 MG: 5 TABLET ORAL at 06:08

## 2024-12-03 RX ADMIN — LACTULOSE 20 G: 20 SOLUTION ORAL at 08:19

## 2024-12-03 RX ADMIN — LACTULOSE 20 G: 20 SOLUTION ORAL at 13:58

## 2024-12-03 RX ADMIN — TRAZODONE HYDROCHLORIDE 50 MG: 50 TABLET ORAL at 21:10

## 2024-12-03 RX ADMIN — CARBOXYMETHYLCELLULOSE SODIUM 1 DROP: 10 GEL OPHTHALMIC at 00:41

## 2024-12-03 RX ADMIN — HYDROMORPHONE HYDROCHLORIDE 0.5 MG: 1 INJECTION, SOLUTION INTRAMUSCULAR; INTRAVENOUS; SUBCUTANEOUS at 20:31

## 2024-12-03 RX ADMIN — SODIUM CHLORIDE, PRESERVATIVE FREE 10 ML: 5 INJECTION INTRAVENOUS at 21:00

## 2024-12-03 RX ADMIN — Medication 9 MCG/MIN: at 03:23

## 2024-12-03 RX ADMIN — HYDROMORPHONE HYDROCHLORIDE 0.5 MG: 1 INJECTION, SOLUTION INTRAMUSCULAR; INTRAVENOUS; SUBCUTANEOUS at 17:30

## 2024-12-03 RX ADMIN — HEPARIN SODIUM: 1000 INJECTION INTRAVENOUS; SUBCUTANEOUS at 10:24

## 2024-12-03 RX ADMIN — MICONAZOLE NITRATE: 2 POWDER TOPICAL at 08:20

## 2024-12-03 RX ADMIN — HYDROMORPHONE HYDROCHLORIDE 0.5 MG: 1 INJECTION, SOLUTION INTRAMUSCULAR; INTRAVENOUS; SUBCUTANEOUS at 08:55

## 2024-12-03 RX ADMIN — HYDROMORPHONE HYDROCHLORIDE 0.5 MG: 1 INJECTION, SOLUTION INTRAMUSCULAR; INTRAVENOUS; SUBCUTANEOUS at 23:48

## 2024-12-03 RX ADMIN — HYDROMORPHONE HYDROCHLORIDE 0.5 MG: 1 INJECTION, SOLUTION INTRAMUSCULAR; INTRAVENOUS; SUBCUTANEOUS at 12:43

## 2024-12-03 RX ADMIN — MICONAZOLE NITRATE: 2 POWDER TOPICAL at 21:00

## 2024-12-03 RX ADMIN — ALBUMIN (HUMAN) 25 G: 0.25 INJECTION, SOLUTION INTRAVENOUS at 00:39

## 2024-12-03 RX ADMIN — SUCRALFATE 1 G: 1 TABLET ORAL at 06:08

## 2024-12-03 RX ADMIN — Medication: at 20:35

## 2024-12-03 RX ADMIN — RIFAXIMIN 550 MG: 550 TABLET ORAL at 08:19

## 2024-12-03 RX ADMIN — OXYCODONE HYDROCHLORIDE 5 MG: 5 TABLET ORAL at 00:47

## 2024-12-03 RX ADMIN — Medication: at 05:43

## 2024-12-03 RX ADMIN — OXYCODONE HYDROCHLORIDE 5 MG: 5 TABLET ORAL at 20:13

## 2024-12-03 RX ADMIN — Medication: at 12:19

## 2024-12-03 RX ADMIN — LANSOPRAZOLE 30 MG: 30 TABLET, ORALLY DISINTEGRATING ORAL at 06:08

## 2024-12-03 RX ADMIN — RIFAXIMIN 550 MG: 550 TABLET ORAL at 21:10

## 2024-12-03 RX ADMIN — ALPRAZOLAM 0.5 MG: 0.5 TABLET ORAL at 06:08

## 2024-12-03 RX ADMIN — HEPARIN SODIUM 5000 UNITS: 5000 INJECTION INTRAVENOUS; SUBCUTANEOUS at 06:08

## 2024-12-03 RX ADMIN — ALPRAZOLAM 0.5 MG: 0.5 TABLET ORAL at 22:11

## 2024-12-03 RX ADMIN — MIDODRINE HYDROCHLORIDE 10 MG: 5 TABLET ORAL at 19:21

## 2024-12-03 RX ADMIN — GABAPENTIN 100 MG: 100 CAPSULE ORAL at 08:20

## 2024-12-03 RX ADMIN — CARBOXYMETHYLCELLULOSE SODIUM 1 DROP: 10 GEL OPHTHALMIC at 19:36

## 2024-12-03 RX ADMIN — LACTULOSE 20 G: 20 SOLUTION ORAL at 21:10

## 2024-12-03 RX ADMIN — Medication: at 14:47

## 2024-12-03 RX ADMIN — HEPARIN SODIUM 5000 UNITS: 5000 INJECTION INTRAVENOUS; SUBCUTANEOUS at 22:11

## 2024-12-03 RX ADMIN — SODIUM PHOSPHATE, MONOBASIC, MONOHYDRATE AND SODIUM PHOSPHATE, DIBASIC, ANHYDROUS 6 MMOL: 142; 276 INJECTION, SOLUTION INTRAVENOUS at 16:23

## 2024-12-03 RX ADMIN — MELATONIN TAB 3 MG 3 MG: 3 TAB at 21:10

## 2024-12-03 ASSESSMENT — PAIN DESCRIPTION - LOCATION
LOCATION: BACK

## 2024-12-03 ASSESSMENT — PAIN - FUNCTIONAL ASSESSMENT
PAIN_FUNCTIONAL_ASSESSMENT: PREVENTS OR INTERFERES SOME ACTIVE ACTIVITIES AND ADLS
PAIN_FUNCTIONAL_ASSESSMENT: ACTIVITIES ARE NOT PREVENTED
PAIN_FUNCTIONAL_ASSESSMENT: PREVENTS OR INTERFERES WITH MANY ACTIVE NOT PASSIVE ACTIVITIES

## 2024-12-03 ASSESSMENT — PAIN SCALES - GENERAL
PAINLEVEL_OUTOF10: 9
PAINLEVEL_OUTOF10: 8
PAINLEVEL_OUTOF10: 9
PAINLEVEL_OUTOF10: 9
PAINLEVEL_OUTOF10: 7
PAINLEVEL_OUTOF10: 7
PAINLEVEL_OUTOF10: 8
PAINLEVEL_OUTOF10: 7
PAINLEVEL_OUTOF10: 9
PAINLEVEL_OUTOF10: 7
PAINLEVEL_OUTOF10: 8

## 2024-12-03 ASSESSMENT — PULMONARY FUNCTION TESTS
PIF_VALUE: 16
PIF_VALUE: 15
PIF_VALUE: 16
PIF_VALUE: 15
PIF_VALUE: 15

## 2024-12-03 ASSESSMENT — PAIN DESCRIPTION - ORIENTATION
ORIENTATION: MID;LOWER
ORIENTATION: MID;LOWER
ORIENTATION: LOWER;MID
ORIENTATION: LOWER;MID
ORIENTATION: MID;LOWER
ORIENTATION: MID;LOWER

## 2024-12-03 ASSESSMENT — PAIN DESCRIPTION - ONSET: ONSET: ON-GOING

## 2024-12-03 ASSESSMENT — PAIN DESCRIPTION - FREQUENCY: FREQUENCY: CONTINUOUS

## 2024-12-03 ASSESSMENT — PAIN DESCRIPTION - DESCRIPTORS
DESCRIPTORS: ACHING;DISCOMFORT

## 2024-12-03 ASSESSMENT — PAIN DESCRIPTION - PAIN TYPE: TYPE: CHRONIC PAIN

## 2024-12-03 NOTE — PLAN OF CARE
Problem: Chronic Conditions and Co-morbidities  Goal: Patient's chronic conditions and co-morbidity symptoms are monitored and maintained or improved  12/3/2024 0822 by Mery Dominguez RN  Outcome: Progressing  12/3/2024 0056 by Katie Cornejo RN  Outcome: Progressing     Problem: Discharge Planning  Goal: Discharge to home or other facility with appropriate resources  12/3/2024 0822 by Mery Dominguez RN  Outcome: Progressing  12/3/2024 0056 by Katie Cornejo RN  Outcome: Progressing     Problem: Pain  Goal: Verbalizes/displays adequate comfort level or baseline comfort level  12/3/2024 0822 by Mery Dominguez RN  Outcome: Progressing  12/3/2024 0056 by Katie Cornejo RN  Outcome: Progressing     Problem: Safety - Adult  Goal: Free from fall injury  12/3/2024 0822 by Mery Dominguez RN  Outcome: Progressing  12/3/2024 0056 by Katie Cornejo RN  Outcome: Progressing     Problem: Nutrition Deficit:  Goal: Optimize nutritional status  12/3/2024 0822 by Mery Dominguez RN  Outcome: Progressing  12/3/2024 0056 by Katie Cornejo RN  Outcome: Progressing     Problem: Skin/Tissue Integrity  Goal: Absence of new skin breakdown  Description: 1.  Monitor for areas of redness and/or skin breakdown  2.  Assess vascular access sites hourly  3.  Every 4-6 hours minimum:  Change oxygen saturation probe site  4.  Every 4-6 hours:  If on nasal continuous positive airway pressure, respiratory therapy assess nares and determine need for appliance change or resting period.  12/3/2024 0822 by Mery Dominguez RN  Outcome: Progressing  12/3/2024 0056 by Katie Cornejo RN  Outcome: Progressing

## 2024-12-03 NOTE — PLAN OF CARE
Problem: Chronic Conditions and Co-morbidities  Goal: Patient's chronic conditions and co-morbidity symptoms are monitored and maintained or improved  Outcome: Progressing     Problem: Discharge Planning  Goal: Discharge to home or other facility with appropriate resources  Outcome: Progressing     Problem: Pain  Goal: Verbalizes/displays adequate comfort level or baseline comfort level  Outcome: Progressing     Problem: Safety - Adult  Goal: Free from fall injury  Outcome: Progressing     Problem: Nutrition Deficit:  Goal: Optimize nutritional status  12/3/2024 0056 by Katie Cornejo RN  Outcome: Progressing  12/2/2024 1735 by Pascale Riddle RD  Outcome: Progressing     Problem: Skin/Tissue Integrity  Goal: Absence of new skin breakdown  Description: 1.  Monitor for areas of redness and/or skin breakdown  2.  Assess vascular access sites hourly  3.  Every 4-6 hours minimum:  Change oxygen saturation probe site  4.  Every 4-6 hours:  If on nasal continuous positive airway pressure, respiratory therapy assess nares and determine need for appliance change or resting period.  Outcome: Progressing

## 2024-12-04 LAB
ANION GAP SERPL CALCULATED.3IONS-SCNC: 4 MMOL/L (ref 9–17)
ANION GAP SERPL CALCULATED.3IONS-SCNC: 4 MMOL/L (ref 9–17)
ANION GAP SERPL CALCULATED.3IONS-SCNC: 6 MMOL/L (ref 9–17)
ANION GAP SERPL CALCULATED.3IONS-SCNC: 7 MMOL/L (ref 9–17)
BUN SERPL-MCNC: 10 MG/DL (ref 7–20)
BUN SERPL-MCNC: 11 MG/DL (ref 7–20)
CALCIUM SERPL-MCNC: 10 MG/DL (ref 8.3–10.6)
CALCIUM SERPL-MCNC: 10 MG/DL (ref 8.3–10.6)
CALCIUM SERPL-MCNC: 10.2 MG/DL (ref 8.3–10.6)
CALCIUM SERPL-MCNC: 10.3 MG/DL (ref 8.3–10.6)
CHLORIDE SERPL-SCNC: 100 MMOL/L (ref 99–110)
CHLORIDE SERPL-SCNC: 102 MMOL/L (ref 99–110)
CHLORIDE SERPL-SCNC: 102 MMOL/L (ref 99–110)
CHLORIDE SERPL-SCNC: 99 MMOL/L (ref 99–110)
CO2 SERPL-SCNC: 27 MMOL/L (ref 21–32)
CO2 SERPL-SCNC: 28 MMOL/L (ref 21–32)
CREAT SERPL-MCNC: 1.3 MG/DL (ref 0.8–1.3)
CREAT SERPL-MCNC: 1.3 MG/DL (ref 0.8–1.3)
CREAT SERPL-MCNC: 1.4 MG/DL (ref 0.8–1.3)
CREAT SERPL-MCNC: 1.4 MG/DL (ref 0.8–1.3)
ERYTHROCYTE [DISTWIDTH] IN BLOOD BY AUTOMATED COUNT: 18.7 % (ref 11.7–14.9)
GFR, ESTIMATED: 52 ML/MIN/1.73M2
GFR, ESTIMATED: 53 ML/MIN/1.73M2
GFR, ESTIMATED: 55 ML/MIN/1.73M2
GFR, ESTIMATED: 55 ML/MIN/1.73M2
GLUCOSE SERPL-MCNC: 103 MG/DL (ref 74–99)
GLUCOSE SERPL-MCNC: 112 MG/DL (ref 74–99)
GLUCOSE SERPL-MCNC: 130 MG/DL (ref 74–99)
GLUCOSE SERPL-MCNC: 148 MG/DL (ref 74–99)
HCT VFR BLD AUTO: 24.4 % (ref 42–52)
HGB BLD-MCNC: 7.3 G/DL (ref 13.5–18)
MAGNESIUM SERPL-MCNC: 2.3 MG/DL (ref 1.8–2.4)
MAGNESIUM SERPL-MCNC: 2.3 MG/DL (ref 1.8–2.4)
MAGNESIUM SERPL-MCNC: 2.4 MG/DL (ref 1.8–2.4)
MAGNESIUM SERPL-MCNC: 2.4 MG/DL (ref 1.8–2.4)
MCH RBC QN AUTO: 29.2 PG (ref 27–31)
MCHC RBC AUTO-ENTMCNC: 29.9 G/DL (ref 32–36)
MCV RBC AUTO: 97.6 FL (ref 78–100)
MICROORGANISM SPEC CULT: ABNORMAL
MICROORGANISM/AGENT SPEC: ABNORMAL
PHOSPHATE SERPL-MCNC: 1.8 MG/DL (ref 2.5–4.9)
PHOSPHATE SERPL-MCNC: 2.4 MG/DL (ref 2.5–4.9)
PHOSPHATE SERPL-MCNC: 2.5 MG/DL (ref 2.5–4.9)
PHOSPHATE SERPL-MCNC: 2.6 MG/DL (ref 2.5–4.9)
PLATELET # BLD AUTO: 109 K/UL (ref 140–440)
PMV BLD AUTO: 8.6 FL (ref 7.5–11.1)
POTASSIUM SERPL-SCNC: 3.5 MMOL/L (ref 3.5–5.1)
POTASSIUM SERPL-SCNC: 3.7 MMOL/L (ref 3.5–5.1)
POTASSIUM SERPL-SCNC: 3.9 MMOL/L (ref 3.5–5.1)
POTASSIUM SERPL-SCNC: 3.9 MMOL/L (ref 3.5–5.1)
RBC # BLD AUTO: 2.5 M/UL (ref 4.6–6.2)
SODIUM SERPL-SCNC: 133 MMOL/L (ref 136–145)
SODIUM SERPL-SCNC: 134 MMOL/L (ref 136–145)
SPECIMEN DESCRIPTION: ABNORMAL
WBC OTHER # BLD: 8.6 K/UL (ref 4–10.5)

## 2024-12-04 PROCEDURE — 6370000000 HC RX 637 (ALT 250 FOR IP): Performed by: SPECIALIST

## 2024-12-04 PROCEDURE — 94640 AIRWAY INHALATION TREATMENT: CPT

## 2024-12-04 PROCEDURE — 94003 VENT MGMT INPAT SUBQ DAY: CPT

## 2024-12-04 PROCEDURE — 89220 SPUTUM SPECIMEN COLLECTION: CPT

## 2024-12-04 PROCEDURE — 6360000002 HC RX W HCPCS: Performed by: SPECIALIST

## 2024-12-04 PROCEDURE — 6360000002 HC RX W HCPCS: Performed by: STUDENT IN AN ORGANIZED HEALTH CARE EDUCATION/TRAINING PROGRAM

## 2024-12-04 PROCEDURE — 85027 COMPLETE CBC AUTOMATED: CPT

## 2024-12-04 PROCEDURE — 94761 N-INVAS EAR/PLS OXIMETRY MLT: CPT

## 2024-12-04 PROCEDURE — 6370000000 HC RX 637 (ALT 250 FOR IP): Performed by: STUDENT IN AN ORGANIZED HEALTH CARE EDUCATION/TRAINING PROGRAM

## 2024-12-04 PROCEDURE — 94667 MNPJ CHEST WALL 1ST: CPT

## 2024-12-04 PROCEDURE — 36592 COLLECT BLOOD FROM PICC: CPT

## 2024-12-04 PROCEDURE — 2580000003 HC RX 258: Performed by: INTERNAL MEDICINE

## 2024-12-04 PROCEDURE — 90945 DIALYSIS ONE EVALUATION: CPT

## 2024-12-04 PROCEDURE — 80048 BASIC METABOLIC PNL TOTAL CA: CPT

## 2024-12-04 PROCEDURE — 2580000003 HC RX 258: Performed by: STUDENT IN AN ORGANIZED HEALTH CARE EDUCATION/TRAINING PROGRAM

## 2024-12-04 PROCEDURE — 6360000002 HC RX W HCPCS: Performed by: REGISTERED NURSE

## 2024-12-04 PROCEDURE — 2000000000 HC ICU R&B

## 2024-12-04 PROCEDURE — 2700000000 HC OXYGEN THERAPY PER DAY

## 2024-12-04 PROCEDURE — 83735 ASSAY OF MAGNESIUM: CPT

## 2024-12-04 PROCEDURE — 2720000010 HC SURG SUPPLY STERILE

## 2024-12-04 PROCEDURE — 84100 ASSAY OF PHOSPHORUS: CPT

## 2024-12-04 PROCEDURE — 94668 MNPJ CHEST WALL SBSQ: CPT

## 2024-12-04 PROCEDURE — 2500000003 HC RX 250 WO HCPCS: Performed by: INTERNAL MEDICINE

## 2024-12-04 PROCEDURE — 2580000003 HC RX 258: Performed by: SPECIALIST

## 2024-12-04 RX ORDER — MIDODRINE HYDROCHLORIDE 5 MG/1
5 TABLET ORAL ONCE
Status: COMPLETED | OUTPATIENT
Start: 2024-12-04 | End: 2024-12-04

## 2024-12-04 RX ORDER — MIDODRINE HYDROCHLORIDE 5 MG/1
15 TABLET ORAL EVERY 8 HOURS
Status: DISCONTINUED | OUTPATIENT
Start: 2024-12-04 | End: 2024-12-09 | Stop reason: HOSPADM

## 2024-12-04 RX ADMIN — ALPRAZOLAM 0.5 MG: 0.5 TABLET ORAL at 17:40

## 2024-12-04 RX ADMIN — Medication: at 04:18

## 2024-12-04 RX ADMIN — Medication 4 ML: at 07:08

## 2024-12-04 RX ADMIN — ALBUTEROL SULFATE 2.5 MG: 2.5 SOLUTION RESPIRATORY (INHALATION) at 10:50

## 2024-12-04 RX ADMIN — ALBUTEROL SULFATE 2.5 MG: 2.5 SOLUTION RESPIRATORY (INHALATION) at 03:44

## 2024-12-04 RX ADMIN — OXYCODONE HYDROCHLORIDE 5 MG: 5 TABLET ORAL at 08:08

## 2024-12-04 RX ADMIN — OXYCODONE HYDROCHLORIDE 5 MG: 5 TABLET ORAL at 01:19

## 2024-12-04 RX ADMIN — GABAPENTIN 100 MG: 100 CAPSULE ORAL at 09:27

## 2024-12-04 RX ADMIN — SUCRALFATE 1 G: 1 TABLET ORAL at 15:00

## 2024-12-04 RX ADMIN — LACTULOSE 20 G: 20 SOLUTION ORAL at 13:40

## 2024-12-04 RX ADMIN — Medication 4 ML: at 03:45

## 2024-12-04 RX ADMIN — ALBUTEROL SULFATE 2.5 MG: 2.5 SOLUTION RESPIRATORY (INHALATION) at 15:37

## 2024-12-04 RX ADMIN — Medication 4 ML: at 10:50

## 2024-12-04 RX ADMIN — SODIUM PHOSPHATE, MONOBASIC, MONOHYDRATE AND SODIUM PHOSPHATE, DIBASIC, ANHYDROUS 12 MMOL: 142; 276 INJECTION, SOLUTION INTRAVENOUS at 02:56

## 2024-12-04 RX ADMIN — ATORVASTATIN CALCIUM 40 MG: 40 TABLET, FILM COATED ORAL at 20:43

## 2024-12-04 RX ADMIN — LACTULOSE 20 G: 20 SOLUTION ORAL at 20:43

## 2024-12-04 RX ADMIN — ALPRAZOLAM 0.5 MG: 0.5 TABLET ORAL at 05:23

## 2024-12-04 RX ADMIN — RIFAXIMIN 550 MG: 550 TABLET ORAL at 09:27

## 2024-12-04 RX ADMIN — TRAZODONE HYDROCHLORIDE 50 MG: 50 TABLET ORAL at 20:43

## 2024-12-04 RX ADMIN — Medication: at 01:50

## 2024-12-04 RX ADMIN — MIDODRINE HYDROCHLORIDE 10 MG: 5 TABLET ORAL at 03:04

## 2024-12-04 RX ADMIN — RIFAXIMIN 550 MG: 550 TABLET ORAL at 20:43

## 2024-12-04 RX ADMIN — HYDROMORPHONE HYDROCHLORIDE 0.5 MG: 1 INJECTION, SOLUTION INTRAMUSCULAR; INTRAVENOUS; SUBCUTANEOUS at 19:52

## 2024-12-04 RX ADMIN — ALBUTEROL SULFATE 2.5 MG: 2.5 SOLUTION RESPIRATORY (INHALATION) at 07:09

## 2024-12-04 RX ADMIN — LACTULOSE 20 G: 20 SOLUTION ORAL at 09:27

## 2024-12-04 RX ADMIN — ALBUTEROL SULFATE 2.5 MG: 2.5 SOLUTION RESPIRATORY (INHALATION) at 20:07

## 2024-12-04 RX ADMIN — OXYCODONE HYDROCHLORIDE 5 MG: 5 TABLET ORAL at 13:40

## 2024-12-04 RX ADMIN — HEPARIN SODIUM: 1000 INJECTION INTRAVENOUS; SUBCUTANEOUS at 15:27

## 2024-12-04 RX ADMIN — CARBOXYMETHYLCELLULOSE SODIUM 1 DROP: 10 GEL OPHTHALMIC at 04:21

## 2024-12-04 RX ADMIN — Medication: at 23:00

## 2024-12-04 RX ADMIN — ALBUTEROL SULFATE 2.5 MG: 2.5 SOLUTION RESPIRATORY (INHALATION) at 00:11

## 2024-12-04 RX ADMIN — HEPARIN SODIUM 5000 UNITS: 5000 INJECTION INTRAVENOUS; SUBCUTANEOUS at 20:43

## 2024-12-04 RX ADMIN — MIDODRINE HYDROCHLORIDE 15 MG: 5 TABLET ORAL at 19:52

## 2024-12-04 RX ADMIN — LANSOPRAZOLE 30 MG: 30 TABLET, ORALLY DISINTEGRATING ORAL at 06:03

## 2024-12-04 RX ADMIN — SODIUM CHLORIDE, PRESERVATIVE FREE 10 ML: 5 INJECTION INTRAVENOUS at 20:43

## 2024-12-04 RX ADMIN — Medication 4 ML: at 15:36

## 2024-12-04 RX ADMIN — SUCRALFATE 1 G: 1 TABLET ORAL at 06:03

## 2024-12-04 RX ADMIN — MIDODRINE HYDROCHLORIDE 10 MG: 5 TABLET ORAL at 10:24

## 2024-12-04 RX ADMIN — SODIUM CHLORIDE, PRESERVATIVE FREE 10 ML: 5 INJECTION INTRAVENOUS at 09:27

## 2024-12-04 RX ADMIN — Medication 700 ML/HR: at 15:05

## 2024-12-04 RX ADMIN — HYDROMORPHONE HYDROCHLORIDE 0.5 MG: 1 INJECTION, SOLUTION INTRAMUSCULAR; INTRAVENOUS; SUBCUTANEOUS at 04:16

## 2024-12-04 RX ADMIN — SUCRALFATE 1 G: 1 TABLET ORAL at 20:43

## 2024-12-04 RX ADMIN — MICONAZOLE NITRATE: 2 POWDER TOPICAL at 20:44

## 2024-12-04 RX ADMIN — HYDROMORPHONE HYDROCHLORIDE 0.5 MG: 1 INJECTION, SOLUTION INTRAMUSCULAR; INTRAVENOUS; SUBCUTANEOUS at 09:28

## 2024-12-04 RX ADMIN — Medication 4 ML: at 00:12

## 2024-12-04 RX ADMIN — HYDROMORPHONE HYDROCHLORIDE 0.5 MG: 1 INJECTION, SOLUTION INTRAMUSCULAR; INTRAVENOUS; SUBCUTANEOUS at 15:40

## 2024-12-04 RX ADMIN — SODIUM CHLORIDE, PRESERVATIVE FREE 10 ML: 5 INJECTION INTRAVENOUS at 20:44

## 2024-12-04 RX ADMIN — SODIUM PHOSPHATE, MONOBASIC, MONOHYDRATE AND SODIUM PHOSPHATE, DIBASIC, ANHYDROUS 6 MMOL: 142; 276 INJECTION, SOLUTION INTRAVENOUS at 20:30

## 2024-12-04 RX ADMIN — Medication 4 ML: at 20:06

## 2024-12-04 RX ADMIN — MIDODRINE HYDROCHLORIDE 5 MG: 5 TABLET ORAL at 10:54

## 2024-12-04 RX ADMIN — HEPARIN SODIUM 5000 UNITS: 5000 INJECTION INTRAVENOUS; SUBCUTANEOUS at 13:40

## 2024-12-04 RX ADMIN — MICONAZOLE NITRATE: 2 POWDER TOPICAL at 09:27

## 2024-12-04 RX ADMIN — HEPARIN SODIUM 5000 UNITS: 5000 INJECTION INTRAVENOUS; SUBCUTANEOUS at 06:03

## 2024-12-04 ASSESSMENT — PULMONARY FUNCTION TESTS
PIF_VALUE: 16
PIF_VALUE: 17
PIF_VALUE: 16
PIF_VALUE: 16
PIF_VALUE: 15
PIF_VALUE: 16
PIF_VALUE: 15
PIF_VALUE: 16
PIF_VALUE: 16

## 2024-12-04 ASSESSMENT — PAIN DESCRIPTION - LOCATION
LOCATION: BACK

## 2024-12-04 ASSESSMENT — PAIN SCALES - GENERAL
PAINLEVEL_OUTOF10: 7
PAINLEVEL_OUTOF10: 5
PAINLEVEL_OUTOF10: 9
PAINLEVEL_OUTOF10: 0
PAINLEVEL_OUTOF10: 10
PAINLEVEL_OUTOF10: 10
PAINLEVEL_OUTOF10: 9
PAINLEVEL_OUTOF10: 9
PAINLEVEL_OUTOF10: 10
PAINLEVEL_OUTOF10: 8
PAINLEVEL_OUTOF10: 8

## 2024-12-04 ASSESSMENT — PAIN - FUNCTIONAL ASSESSMENT
PAIN_FUNCTIONAL_ASSESSMENT: PREVENTS OR INTERFERES SOME ACTIVE ACTIVITIES AND ADLS
PAIN_FUNCTIONAL_ASSESSMENT: PREVENTS OR INTERFERES SOME ACTIVE ACTIVITIES AND ADLS
PAIN_FUNCTIONAL_ASSESSMENT: ACTIVITIES ARE NOT PREVENTED
PAIN_FUNCTIONAL_ASSESSMENT: PREVENTS OR INTERFERES WITH MANY ACTIVE NOT PASSIVE ACTIVITIES
PAIN_FUNCTIONAL_ASSESSMENT: PREVENTS OR INTERFERES SOME ACTIVE ACTIVITIES AND ADLS

## 2024-12-04 ASSESSMENT — PAIN DESCRIPTION - DESCRIPTORS
DESCRIPTORS: DISCOMFORT;ACHING
DESCRIPTORS: ACHING;DISCOMFORT
DESCRIPTORS: ACHING;DISCOMFORT
DESCRIPTORS: ACHING;DISCOMFORT;THROBBING
DESCRIPTORS: ACHING
DESCRIPTORS: ACHING;DISCOMFORT
DESCRIPTORS: ACHING

## 2024-12-04 ASSESSMENT — PAIN SCALES - WONG BAKER: WONGBAKER_NUMERICALRESPONSE: NO HURT

## 2024-12-04 ASSESSMENT — PAIN DESCRIPTION - ORIENTATION
ORIENTATION: LOWER;MID
ORIENTATION: LOWER
ORIENTATION: LOWER;MID
ORIENTATION: POSTERIOR
ORIENTATION: LOWER;MID;POSTERIOR
ORIENTATION: MID;LOWER
ORIENTATION: LOWER;POSTERIOR

## 2024-12-04 ASSESSMENT — PAIN DESCRIPTION - ONSET
ONSET: ON-GOING
ONSET: ON-GOING

## 2024-12-04 ASSESSMENT — PAIN DESCRIPTION - PAIN TYPE
TYPE: CHRONIC PAIN
TYPE: CHRONIC PAIN

## 2024-12-04 ASSESSMENT — PAIN DESCRIPTION - FREQUENCY
FREQUENCY: CONTINUOUS
FREQUENCY: CONTINUOUS

## 2024-12-05 LAB
ALBUMIN SERPL-MCNC: 3 G/DL (ref 3.4–5)
ALBUMIN/GLOB SERPL: 0.7 {RATIO} (ref 1.1–2.2)
ALP SERPL-CCNC: 137 U/L (ref 40–129)
ALT SERPL-CCNC: 12 U/L (ref 10–40)
ANION GAP SERPL CALCULATED.3IONS-SCNC: 5 MMOL/L (ref 9–17)
AST SERPL-CCNC: 31 U/L (ref 15–37)
BILIRUB DIRECT SERPL-MCNC: 0.2 MG/DL (ref 0–0.3)
BILIRUB INDIRECT SERPL-MCNC: 0.2 MG/DL (ref 0–0.7)
BILIRUB SERPL-MCNC: 0.5 MG/DL (ref 0–1)
BUN SERPL-MCNC: 11 MG/DL (ref 7–20)
CALCIUM SERPL-MCNC: 10.3 MG/DL (ref 8.3–10.6)
CHLORIDE SERPL-SCNC: 100 MMOL/L (ref 99–110)
CO2 SERPL-SCNC: 29 MMOL/L (ref 21–32)
CREAT SERPL-MCNC: 1.3 MG/DL (ref 0.8–1.3)
ERYTHROCYTE [DISTWIDTH] IN BLOOD BY AUTOMATED COUNT: 18.9 % (ref 11.7–14.9)
GFR, ESTIMATED: 57 ML/MIN/1.73M2
GLUCOSE SERPL-MCNC: 108 MG/DL (ref 74–99)
HCT VFR BLD AUTO: 24.5 % (ref 42–52)
HGB BLD-MCNC: 7.4 G/DL (ref 13.5–18)
INR PPP: 1.2
MAGNESIUM SERPL-MCNC: 2.3 MG/DL (ref 1.8–2.4)
MCH RBC QN AUTO: 29.1 PG (ref 27–31)
MCHC RBC AUTO-ENTMCNC: 30.2 G/DL (ref 32–36)
MCV RBC AUTO: 96.5 FL (ref 78–100)
MICROORGANISM SPEC CULT: NORMAL
MICROORGANISM/AGENT SPEC: NORMAL
PHOSPHATE SERPL-MCNC: 2.3 MG/DL (ref 2.5–4.9)
PLATELET # BLD AUTO: 105 K/UL (ref 140–440)
PMV BLD AUTO: 8.3 FL (ref 7.5–11.1)
POTASSIUM SERPL-SCNC: 3.9 MMOL/L (ref 3.5–5.1)
PROT SERPL-MCNC: 7.3 G/DL (ref 6.4–8.2)
PROTHROMBIN TIME: 15.8 SEC (ref 11.7–14.5)
RBC # BLD AUTO: 2.54 M/UL (ref 4.6–6.2)
SERVICE CMNT-IMP: NORMAL
SODIUM SERPL-SCNC: 133 MMOL/L (ref 136–145)
SPECIMEN DESCRIPTION: NORMAL
WBC OTHER # BLD: 19.6 K/UL (ref 4–10.5)

## 2024-12-05 PROCEDURE — 2000000000 HC ICU R&B

## 2024-12-05 PROCEDURE — 89220 SPUTUM SPECIMEN COLLECTION: CPT

## 2024-12-05 PROCEDURE — 2500000003 HC RX 250 WO HCPCS: Performed by: STUDENT IN AN ORGANIZED HEALTH CARE EDUCATION/TRAINING PROGRAM

## 2024-12-05 PROCEDURE — 94003 VENT MGMT INPAT SUBQ DAY: CPT

## 2024-12-05 PROCEDURE — 2700000000 HC OXYGEN THERAPY PER DAY

## 2024-12-05 PROCEDURE — 94761 N-INVAS EAR/PLS OXIMETRY MLT: CPT

## 2024-12-05 PROCEDURE — 85610 PROTHROMBIN TIME: CPT

## 2024-12-05 PROCEDURE — 6370000000 HC RX 637 (ALT 250 FOR IP): Performed by: SPECIALIST

## 2024-12-05 PROCEDURE — 94668 MNPJ CHEST WALL SBSQ: CPT

## 2024-12-05 PROCEDURE — 90945 DIALYSIS ONE EVALUATION: CPT

## 2024-12-05 PROCEDURE — 80053 COMPREHEN METABOLIC PANEL: CPT

## 2024-12-05 PROCEDURE — 6360000002 HC RX W HCPCS: Performed by: SPECIALIST

## 2024-12-05 PROCEDURE — 85027 COMPLETE CBC AUTOMATED: CPT

## 2024-12-05 PROCEDURE — 83735 ASSAY OF MAGNESIUM: CPT

## 2024-12-05 PROCEDURE — 2580000003 HC RX 258: Performed by: INTERNAL MEDICINE

## 2024-12-05 PROCEDURE — 6370000000 HC RX 637 (ALT 250 FOR IP): Performed by: STUDENT IN AN ORGANIZED HEALTH CARE EDUCATION/TRAINING PROGRAM

## 2024-12-05 PROCEDURE — 6360000002 HC RX W HCPCS: Performed by: REGISTERED NURSE

## 2024-12-05 PROCEDURE — 6360000002 HC RX W HCPCS: Performed by: INTERNAL MEDICINE

## 2024-12-05 PROCEDURE — 2580000003 HC RX 258: Performed by: SPECIALIST

## 2024-12-05 PROCEDURE — 2580000003 HC RX 258: Performed by: STUDENT IN AN ORGANIZED HEALTH CARE EDUCATION/TRAINING PROGRAM

## 2024-12-05 PROCEDURE — 6360000002 HC RX W HCPCS: Performed by: STUDENT IN AN ORGANIZED HEALTH CARE EDUCATION/TRAINING PROGRAM

## 2024-12-05 PROCEDURE — 82248 BILIRUBIN DIRECT: CPT

## 2024-12-05 PROCEDURE — 36592 COLLECT BLOOD FROM PICC: CPT

## 2024-12-05 PROCEDURE — 94640 AIRWAY INHALATION TREATMENT: CPT

## 2024-12-05 PROCEDURE — 84100 ASSAY OF PHOSPHORUS: CPT

## 2024-12-05 PROCEDURE — 2500000003 HC RX 250 WO HCPCS: Performed by: INTERNAL MEDICINE

## 2024-12-05 RX ORDER — HEPARIN SODIUM 1000 [USP'U]/ML
INJECTION, SOLUTION INTRAVENOUS; SUBCUTANEOUS PRN
Status: DISCONTINUED | OUTPATIENT
Start: 2024-12-05 | End: 2024-12-09 | Stop reason: HOSPADM

## 2024-12-05 RX ADMIN — ALBUTEROL SULFATE 2.5 MG: 2.5 SOLUTION RESPIRATORY (INHALATION) at 23:54

## 2024-12-05 RX ADMIN — HEPARIN SODIUM 5000 UNITS: 5000 INJECTION INTRAVENOUS; SUBCUTANEOUS at 20:12

## 2024-12-05 RX ADMIN — OXYCODONE HYDROCHLORIDE 5 MG: 5 TABLET ORAL at 00:30

## 2024-12-05 RX ADMIN — HEPARIN SODIUM: 1000 INJECTION INTRAVENOUS; SUBCUTANEOUS at 10:00

## 2024-12-05 RX ADMIN — LACTULOSE 20 G: 20 SOLUTION ORAL at 08:29

## 2024-12-05 RX ADMIN — Medication 4 ML: at 04:05

## 2024-12-05 RX ADMIN — RIFAXIMIN 550 MG: 550 TABLET ORAL at 20:14

## 2024-12-05 RX ADMIN — LANSOPRAZOLE 30 MG: 30 TABLET, ORALLY DISINTEGRATING ORAL at 06:10

## 2024-12-05 RX ADMIN — CEFEPIME 2000 MG: 2 INJECTION, POWDER, FOR SOLUTION INTRAVENOUS at 08:44

## 2024-12-05 RX ADMIN — Medication 4 ML: at 19:32

## 2024-12-05 RX ADMIN — EPOETIN ALFA-EPBX 10000 UNITS: 10000 INJECTION, SOLUTION INTRAVENOUS; SUBCUTANEOUS at 10:29

## 2024-12-05 RX ADMIN — SODIUM PHOSPHATE, MONOBASIC, MONOHYDRATE AND SODIUM PHOSPHATE, DIBASIC, ANHYDROUS 6 MMOL: 142; 276 INJECTION, SOLUTION INTRAVENOUS at 10:23

## 2024-12-05 RX ADMIN — MIDODRINE HYDROCHLORIDE 15 MG: 5 TABLET ORAL at 12:05

## 2024-12-05 RX ADMIN — MICONAZOLE NITRATE: 2 POWDER TOPICAL at 10:30

## 2024-12-05 RX ADMIN — LACTULOSE 20 G: 20 SOLUTION ORAL at 20:12

## 2024-12-05 RX ADMIN — Medication 7 MCG/MIN: at 20:11

## 2024-12-05 RX ADMIN — OXYCODONE HYDROCHLORIDE 5 MG: 5 TABLET ORAL at 08:29

## 2024-12-05 RX ADMIN — MICONAZOLE NITRATE: 2 POWDER TOPICAL at 20:15

## 2024-12-05 RX ADMIN — ALBUTEROL SULFATE 2.5 MG: 2.5 SOLUTION RESPIRATORY (INHALATION) at 19:31

## 2024-12-05 RX ADMIN — MIDODRINE HYDROCHLORIDE 15 MG: 5 TABLET ORAL at 03:05

## 2024-12-05 RX ADMIN — ALBUTEROL SULFATE 2.5 MG: 2.5 SOLUTION RESPIRATORY (INHALATION) at 00:06

## 2024-12-05 RX ADMIN — ALBUTEROL SULFATE 2.5 MG: 2.5 SOLUTION RESPIRATORY (INHALATION) at 04:05

## 2024-12-05 RX ADMIN — HEPARIN SODIUM 2100 UNITS: 1000 INJECTION INTRAVENOUS; SUBCUTANEOUS at 12:38

## 2024-12-05 RX ADMIN — SODIUM CHLORIDE, PRESERVATIVE FREE 10 ML: 5 INJECTION INTRAVENOUS at 10:29

## 2024-12-05 RX ADMIN — HEPARIN SODIUM 5000 UNITS: 5000 INJECTION INTRAVENOUS; SUBCUTANEOUS at 15:28

## 2024-12-05 RX ADMIN — Medication 4 ML: at 23:53

## 2024-12-05 RX ADMIN — TRAZODONE HYDROCHLORIDE 50 MG: 50 TABLET ORAL at 20:12

## 2024-12-05 RX ADMIN — ONDANSETRON 4 MG: 2 INJECTION INTRAMUSCULAR; INTRAVENOUS at 10:28

## 2024-12-05 RX ADMIN — MIDODRINE HYDROCHLORIDE 15 MG: 5 TABLET ORAL at 19:04

## 2024-12-05 RX ADMIN — Medication 4 ML: at 00:06

## 2024-12-05 RX ADMIN — SODIUM CHLORIDE, PRESERVATIVE FREE 10 ML: 5 INJECTION INTRAVENOUS at 20:15

## 2024-12-05 RX ADMIN — LACTULOSE 20 G: 20 SOLUTION ORAL at 15:29

## 2024-12-05 RX ADMIN — GABAPENTIN 100 MG: 100 CAPSULE ORAL at 08:30

## 2024-12-05 RX ADMIN — SUCRALFATE 1 G: 1 TABLET ORAL at 20:12

## 2024-12-05 RX ADMIN — ATORVASTATIN CALCIUM 40 MG: 40 TABLET, FILM COATED ORAL at 20:12

## 2024-12-05 RX ADMIN — HYDROMORPHONE HYDROCHLORIDE 0.5 MG: 1 INJECTION, SOLUTION INTRAMUSCULAR; INTRAVENOUS; SUBCUTANEOUS at 12:16

## 2024-12-05 RX ADMIN — SUCRALFATE 1 G: 1 TABLET ORAL at 15:29

## 2024-12-05 RX ADMIN — HEPARIN SODIUM 5000 UNITS: 5000 INJECTION INTRAVENOUS; SUBCUTANEOUS at 06:10

## 2024-12-05 RX ADMIN — RIFAXIMIN 550 MG: 550 TABLET ORAL at 08:30

## 2024-12-05 RX ADMIN — ALPRAZOLAM 0.5 MG: 0.5 TABLET ORAL at 09:18

## 2024-12-05 RX ADMIN — ALBUTEROL SULFATE 2.5 MG: 2.5 SOLUTION RESPIRATORY (INHALATION) at 07:35

## 2024-12-05 RX ADMIN — HYDROMORPHONE HYDROCHLORIDE 0.5 MG: 1 INJECTION, SOLUTION INTRAMUSCULAR; INTRAVENOUS; SUBCUTANEOUS at 03:36

## 2024-12-05 RX ADMIN — Medication 4 ML: at 07:35

## 2024-12-05 RX ADMIN — Medication: at 01:00

## 2024-12-05 RX ADMIN — HEPARIN SODIUM 2100 UNITS: 1000 INJECTION INTRAVENOUS; SUBCUTANEOUS at 12:37

## 2024-12-05 RX ADMIN — Medication: at 05:01

## 2024-12-05 RX ADMIN — SUCRALFATE 1 G: 1 TABLET ORAL at 06:10

## 2024-12-05 ASSESSMENT — PULMONARY FUNCTION TESTS
PIF_VALUE: 36
PIF_VALUE: 16
PIF_VALUE: 32
PIF_VALUE: 16
PIF_VALUE: 25
PIF_VALUE: 16
PIF_VALUE: 20
PIF_VALUE: 28
PIF_VALUE: 16
PIF_VALUE: 31
PIF_VALUE: 16
PIF_VALUE: 36
PIF_VALUE: 16
PIF_VALUE: 16
PIF_VALUE: 26
PIF_VALUE: 15
PIF_VALUE: 16
PIF_VALUE: 29
PIF_VALUE: 16
PIF_VALUE: 16
PIF_VALUE: 31
PIF_VALUE: 16
PIF_VALUE: 38
PIF_VALUE: 16
PIF_VALUE: 16
PIF_VALUE: 15
PIF_VALUE: 29
PIF_VALUE: 16
PIF_VALUE: 29
PIF_VALUE: 38
PIF_VALUE: 26
PIF_VALUE: 16
PIF_VALUE: 19
PIF_VALUE: 16
PIF_VALUE: 30
PIF_VALUE: 16
PIF_VALUE: 26
PIF_VALUE: 16
PIF_VALUE: 34
PIF_VALUE: 16
PIF_VALUE: 33
PIF_VALUE: 16
PIF_VALUE: 30
PIF_VALUE: 31
PIF_VALUE: 16
PIF_VALUE: 32
PIF_VALUE: 16
PIF_VALUE: 26
PIF_VALUE: 16
PIF_VALUE: 34
PIF_VALUE: 16

## 2024-12-05 ASSESSMENT — PAIN DESCRIPTION - ONSET
ONSET: ON-GOING
ONSET: ON-GOING

## 2024-12-05 ASSESSMENT — PAIN DESCRIPTION - PAIN TYPE
TYPE: CHRONIC PAIN
TYPE: CHRONIC PAIN

## 2024-12-05 ASSESSMENT — PAIN - FUNCTIONAL ASSESSMENT
PAIN_FUNCTIONAL_ASSESSMENT: PREVENTS OR INTERFERES SOME ACTIVE ACTIVITIES AND ADLS
PAIN_FUNCTIONAL_ASSESSMENT: PREVENTS OR INTERFERES SOME ACTIVE ACTIVITIES AND ADLS
PAIN_FUNCTIONAL_ASSESSMENT: ACTIVITIES ARE NOT PREVENTED

## 2024-12-05 ASSESSMENT — PAIN SCALES - GENERAL
PAINLEVEL_OUTOF10: 3
PAINLEVEL_OUTOF10: 9
PAINLEVEL_OUTOF10: 6
PAINLEVEL_OUTOF10: 7
PAINLEVEL_OUTOF10: 7
PAINLEVEL_OUTOF10: 9

## 2024-12-05 ASSESSMENT — PAIN SCALES - WONG BAKER
WONGBAKER_NUMERICALRESPONSE: HURTS A LITTLE BIT
WONGBAKER_NUMERICALRESPONSE: NO HURT
WONGBAKER_NUMERICALRESPONSE: HURTS A LITTLE BIT

## 2024-12-05 ASSESSMENT — PAIN DESCRIPTION - DESCRIPTORS
DESCRIPTORS: ACHING;DISCOMFORT
DESCRIPTORS: ACHING

## 2024-12-05 ASSESSMENT — PAIN DESCRIPTION - ORIENTATION
ORIENTATION: MID
ORIENTATION: POSTERIOR
ORIENTATION: MID;POSTERIOR
ORIENTATION: POSTERIOR

## 2024-12-05 ASSESSMENT — PAIN DESCRIPTION - LOCATION
LOCATION: BACK

## 2024-12-05 ASSESSMENT — PAIN DESCRIPTION - FREQUENCY
FREQUENCY: CONTINUOUS
FREQUENCY: CONTINUOUS

## 2024-12-05 NOTE — CARE COORDINATION
Cm in to see pt for follow up. Pt known to this Cm from recent previous hospitalization at [facility]. Pt admitted from [facility] and plan is for return to SNF at discharge. Pt receives dialysis and vent/trach care at the SNF. Wife resides in [place]. CM will follow for coordination of return to SNF at discharge.

## 2024-12-05 NOTE — PROCEDURES
PROCEDURE NOTE  Date: 12/5/2024   Name: Tico Day  YOB: 1960    Paracentesis    Date/Time: 12/5/2024 1:45 PM    Performed by: Liliana Mcpherson APRN - NP  Authorized by: Liliana Mcpherson APRN - NP  Consent: Verbal consent obtained.  Risks and benefits: risks, benefits and alternatives were discussed  Consent given by: spouse  Patient identity confirmed: arm band and hospital-assigned identification number  Time out: Immediately prior to procedure a \"time out\" was called to verify the correct patient, procedure, equipment, support staff and site/side marked as required.  Initial or subsequent exam: initial  Procedure purpose: therapeutic  Indications: abdominal discomfort secondary to ascites  Anesthesia: local infiltration    Anesthesia:  Local Anesthetic: lidocaine 1% without epinephrine    Sedation:  Patient sedated: no    Needle gauge: 18  Ultrasound guidance: yes  Puncture site: right lower quadrant  Fluid appearance: serous  Dressing: 4x4 sterile gauze  Patient tolerance: patient tolerated the procedure well with no immediate complications  Comments: 1900 ml fluid removal

## 2024-12-06 LAB
ALBUMIN SERPL-MCNC: 2.9 G/DL (ref 3.4–5)
ALBUMIN/GLOB SERPL: 0.7 {RATIO} (ref 1.1–2.2)
ALP SERPL-CCNC: 144 U/L (ref 40–129)
ALT SERPL-CCNC: 10 U/L (ref 10–40)
ANION GAP SERPL CALCULATED.3IONS-SCNC: 5 MMOL/L (ref 9–17)
AST SERPL-CCNC: 29 U/L (ref 15–37)
BILIRUB DIRECT SERPL-MCNC: 0.2 MG/DL (ref 0–0.3)
BILIRUB INDIRECT SERPL-MCNC: 0.2 MG/DL (ref 0–0.7)
BILIRUB SERPL-MCNC: 0.5 MG/DL (ref 0–1)
BUN SERPL-MCNC: 21 MG/DL (ref 7–20)
CALCIUM SERPL-MCNC: 11.6 MG/DL (ref 8.3–10.6)
CHLORIDE SERPL-SCNC: 100 MMOL/L (ref 99–110)
CO2 SERPL-SCNC: 27 MMOL/L (ref 21–32)
CREAT SERPL-MCNC: 2 MG/DL (ref 0.8–1.3)
GFR, ESTIMATED: 35 ML/MIN/1.73M2
GLUCOSE SERPL-MCNC: 101 MG/DL (ref 74–99)
INR PPP: 1.2
MAGNESIUM SERPL-MCNC: 2.5 MG/DL (ref 1.8–2.4)
PHOSPHATE SERPL-MCNC: 2.7 MG/DL (ref 2.5–4.9)
POTASSIUM SERPL-SCNC: 4.1 MMOL/L (ref 3.5–5.1)
PROT SERPL-MCNC: 7.1 G/DL (ref 6.4–8.2)
PROTHROMBIN TIME: 15.6 SEC (ref 11.7–14.5)
SODIUM SERPL-SCNC: 131 MMOL/L (ref 136–145)

## 2024-12-06 PROCEDURE — 94761 N-INVAS EAR/PLS OXIMETRY MLT: CPT

## 2024-12-06 PROCEDURE — 2700000000 HC OXYGEN THERAPY PER DAY

## 2024-12-06 PROCEDURE — 6370000000 HC RX 637 (ALT 250 FOR IP): Performed by: STUDENT IN AN ORGANIZED HEALTH CARE EDUCATION/TRAINING PROGRAM

## 2024-12-06 PROCEDURE — 2000000000 HC ICU R&B

## 2024-12-06 PROCEDURE — 6360000002 HC RX W HCPCS: Performed by: STUDENT IN AN ORGANIZED HEALTH CARE EDUCATION/TRAINING PROGRAM

## 2024-12-06 PROCEDURE — 6370000000 HC RX 637 (ALT 250 FOR IP): Performed by: SPECIALIST

## 2024-12-06 PROCEDURE — 94640 AIRWAY INHALATION TREATMENT: CPT

## 2024-12-06 PROCEDURE — 83735 ASSAY OF MAGNESIUM: CPT

## 2024-12-06 PROCEDURE — 84100 ASSAY OF PHOSPHORUS: CPT

## 2024-12-06 PROCEDURE — 2580000003 HC RX 258: Performed by: STUDENT IN AN ORGANIZED HEALTH CARE EDUCATION/TRAINING PROGRAM

## 2024-12-06 PROCEDURE — 85610 PROTHROMBIN TIME: CPT

## 2024-12-06 PROCEDURE — 82248 BILIRUBIN DIRECT: CPT

## 2024-12-06 PROCEDURE — 94003 VENT MGMT INPAT SUBQ DAY: CPT

## 2024-12-06 PROCEDURE — 80053 COMPREHEN METABOLIC PANEL: CPT

## 2024-12-06 PROCEDURE — 89220 SPUTUM SPECIMEN COLLECTION: CPT

## 2024-12-06 PROCEDURE — 94668 MNPJ CHEST WALL SBSQ: CPT

## 2024-12-06 RX ORDER — OXYCODONE HYDROCHLORIDE 5 MG/1
5 TABLET ORAL EVERY 4 HOURS PRN
Status: DISCONTINUED | OUTPATIENT
Start: 2024-12-06 | End: 2024-12-09 | Stop reason: HOSPADM

## 2024-12-06 RX ADMIN — SODIUM CHLORIDE, PRESERVATIVE FREE 10 ML: 5 INJECTION INTRAVENOUS at 08:47

## 2024-12-06 RX ADMIN — CEFEPIME 1000 MG: 1 INJECTION, POWDER, FOR SOLUTION INTRAMUSCULAR; INTRAVENOUS at 18:43

## 2024-12-06 RX ADMIN — MIDODRINE HYDROCHLORIDE 15 MG: 5 TABLET ORAL at 18:40

## 2024-12-06 RX ADMIN — LANSOPRAZOLE 30 MG: 30 TABLET, ORALLY DISINTEGRATING ORAL at 05:41

## 2024-12-06 RX ADMIN — LACTULOSE 20 G: 20 SOLUTION ORAL at 14:22

## 2024-12-06 RX ADMIN — SUCRALFATE 1 G: 1 TABLET ORAL at 14:22

## 2024-12-06 RX ADMIN — ALBUTEROL SULFATE 2.5 MG: 2.5 SOLUTION RESPIRATORY (INHALATION) at 11:19

## 2024-12-06 RX ADMIN — SUCRALFATE 1 G: 1 TABLET ORAL at 05:41

## 2024-12-06 RX ADMIN — OXYCODONE HYDROCHLORIDE 5 MG: 5 TABLET ORAL at 21:02

## 2024-12-06 RX ADMIN — ALBUTEROL SULFATE 2.5 MG: 2.5 SOLUTION RESPIRATORY (INHALATION) at 03:35

## 2024-12-06 RX ADMIN — MICONAZOLE NITRATE: 2 POWDER TOPICAL at 08:40

## 2024-12-06 RX ADMIN — Medication 4 ML: at 08:08

## 2024-12-06 RX ADMIN — HEPARIN SODIUM 5000 UNITS: 5000 INJECTION INTRAVENOUS; SUBCUTANEOUS at 21:02

## 2024-12-06 RX ADMIN — HEPARIN SODIUM 5000 UNITS: 5000 INJECTION INTRAVENOUS; SUBCUTANEOUS at 13:33

## 2024-12-06 RX ADMIN — ALPRAZOLAM 0.5 MG: 0.5 TABLET ORAL at 12:27

## 2024-12-06 RX ADMIN — OXYCODONE HYDROCHLORIDE 5 MG: 5 TABLET ORAL at 08:39

## 2024-12-06 RX ADMIN — MIDODRINE HYDROCHLORIDE 15 MG: 5 TABLET ORAL at 11:50

## 2024-12-06 RX ADMIN — TRAZODONE HYDROCHLORIDE 50 MG: 50 TABLET ORAL at 21:02

## 2024-12-06 RX ADMIN — ALPRAZOLAM 0.5 MG: 0.5 TABLET ORAL at 21:02

## 2024-12-06 RX ADMIN — RIFAXIMIN 550 MG: 550 TABLET ORAL at 08:39

## 2024-12-06 RX ADMIN — GABAPENTIN 100 MG: 100 CAPSULE ORAL at 08:39

## 2024-12-06 RX ADMIN — ALBUTEROL SULFATE 2.5 MG: 2.5 SOLUTION RESPIRATORY (INHALATION) at 15:04

## 2024-12-06 RX ADMIN — MICONAZOLE NITRATE: 2 POWDER TOPICAL at 21:02

## 2024-12-06 RX ADMIN — LACTULOSE 20 G: 20 SOLUTION ORAL at 21:02

## 2024-12-06 RX ADMIN — RIFAXIMIN 550 MG: 550 TABLET ORAL at 21:30

## 2024-12-06 RX ADMIN — MIDODRINE HYDROCHLORIDE 15 MG: 5 TABLET ORAL at 03:18

## 2024-12-06 RX ADMIN — OXYCODONE HYDROCHLORIDE 5 MG: 5 TABLET ORAL at 13:33

## 2024-12-06 RX ADMIN — ATORVASTATIN CALCIUM 40 MG: 40 TABLET, FILM COATED ORAL at 21:02

## 2024-12-06 RX ADMIN — Medication 4 ML: at 11:18

## 2024-12-06 RX ADMIN — ALBUTEROL SULFATE 2.5 MG: 2.5 SOLUTION RESPIRATORY (INHALATION) at 08:07

## 2024-12-06 RX ADMIN — SUCRALFATE 1 G: 1 TABLET ORAL at 21:02

## 2024-12-06 RX ADMIN — Medication 4 ML: at 03:36

## 2024-12-06 RX ADMIN — HEPARIN SODIUM 5000 UNITS: 5000 INJECTION INTRAVENOUS; SUBCUTANEOUS at 05:41

## 2024-12-06 RX ADMIN — Medication 4 ML: at 15:05

## 2024-12-06 RX ADMIN — LACTULOSE 20 G: 20 SOLUTION ORAL at 08:39

## 2024-12-06 ASSESSMENT — PULMONARY FUNCTION TESTS
PIF_VALUE: 34
PIF_VALUE: 21
PIF_VALUE: 34
PIF_VALUE: 33
PIF_VALUE: 26
PIF_VALUE: 31
PIF_VALUE: 31
PIF_VALUE: 29
PIF_VALUE: 29
PIF_VALUE: 32
PIF_VALUE: 21
PIF_VALUE: 32
PIF_VALUE: 30
PIF_VALUE: 29
PIF_VALUE: 21
PIF_VALUE: 21
PIF_VALUE: 27
PIF_VALUE: 35
PIF_VALUE: 21
PIF_VALUE: 35
PIF_VALUE: 30
PIF_VALUE: 21
PIF_VALUE: 30
PIF_VALUE: 20
PIF_VALUE: 31
PIF_VALUE: 30
PIF_VALUE: 32
PIF_VALUE: 21
PIF_VALUE: 31
PIF_VALUE: 21
PIF_VALUE: 31
PIF_VALUE: 23
PIF_VALUE: 31
PIF_VALUE: 20
PIF_VALUE: 21
PIF_VALUE: 16
PIF_VALUE: 32
PIF_VALUE: 31
PIF_VALUE: 15
PIF_VALUE: 21
PIF_VALUE: 38
PIF_VALUE: 30
PIF_VALUE: 37
PIF_VALUE: 21
PIF_VALUE: 37
PIF_VALUE: 21
PIF_VALUE: 16
PIF_VALUE: 34
PIF_VALUE: 34
PIF_VALUE: 39

## 2024-12-06 ASSESSMENT — PAIN DESCRIPTION - DESCRIPTORS
DESCRIPTORS: ACHING
DESCRIPTORS: ACHING
DESCRIPTORS: ACHING;DISCOMFORT

## 2024-12-06 ASSESSMENT — PAIN SCALES - WONG BAKER
WONGBAKER_NUMERICALRESPONSE: HURTS A LITTLE BIT

## 2024-12-06 ASSESSMENT — PAIN DESCRIPTION - ONSET
ONSET: ON-GOING
ONSET: ON-GOING

## 2024-12-06 ASSESSMENT — PAIN DESCRIPTION - ORIENTATION
ORIENTATION: LEFT;RIGHT;MID;POSTERIOR
ORIENTATION: MID;LEFT;POSTERIOR
ORIENTATION: MID;LEFT;POSTERIOR;RIGHT

## 2024-12-06 ASSESSMENT — PAIN DESCRIPTION - LOCATION
LOCATION: BACK

## 2024-12-06 ASSESSMENT — PAIN DESCRIPTION - PAIN TYPE
TYPE: CHRONIC PAIN
TYPE: CHRONIC PAIN

## 2024-12-06 ASSESSMENT — PAIN - FUNCTIONAL ASSESSMENT
PAIN_FUNCTIONAL_ASSESSMENT: ACTIVITIES ARE NOT PREVENTED

## 2024-12-06 ASSESSMENT — PAIN SCALES - GENERAL
PAINLEVEL_OUTOF10: 8
PAINLEVEL_OUTOF10: 10
PAINLEVEL_OUTOF10: 8

## 2024-12-06 ASSESSMENT — PAIN DESCRIPTION - FREQUENCY
FREQUENCY: CONTINUOUS
FREQUENCY: CONTINUOUS

## 2024-12-07 LAB
ANION GAP SERPL CALCULATED.3IONS-SCNC: 6 MMOL/L (ref 9–17)
BUN SERPL-MCNC: 34 MG/DL (ref 7–20)
CALCIUM SERPL-MCNC: 12.6 MG/DL (ref 8.3–10.6)
CHLORIDE SERPL-SCNC: 99 MMOL/L (ref 99–110)
CO2 SERPL-SCNC: 26 MMOL/L (ref 21–32)
CREAT SERPL-MCNC: 2.5 MG/DL (ref 0.8–1.3)
ERYTHROCYTE [DISTWIDTH] IN BLOOD BY AUTOMATED COUNT: 18.4 % (ref 11.7–14.9)
GFR, ESTIMATED: 26 ML/MIN/1.73M2
GLUCOSE SERPL-MCNC: 89 MG/DL (ref 74–99)
HCT VFR BLD AUTO: 22.2 % (ref 42–52)
HCT VFR BLD AUTO: 25.3 % (ref 42–52)
HGB BLD-MCNC: 6.6 G/DL (ref 13.5–18)
HGB BLD-MCNC: 7.8 G/DL (ref 13.5–18)
INR PPP: 1.2
MAGNESIUM SERPL-MCNC: 2.7 MG/DL (ref 1.8–2.4)
MCH RBC QN AUTO: 29.1 PG (ref 27–31)
MCHC RBC AUTO-ENTMCNC: 29.7 G/DL (ref 32–36)
MCV RBC AUTO: 97.8 FL (ref 78–100)
PHOSPHATE SERPL-MCNC: 4.1 MG/DL (ref 2.5–4.9)
PLATELET # BLD AUTO: 144 K/UL (ref 140–440)
PMV BLD AUTO: 8.5 FL (ref 7.5–11.1)
POTASSIUM SERPL-SCNC: 4.3 MMOL/L (ref 3.5–5.1)
PROTHROMBIN TIME: 15.5 SEC (ref 11.7–14.5)
RBC # BLD AUTO: 2.27 M/UL (ref 4.6–6.2)
SODIUM SERPL-SCNC: 130 MMOL/L (ref 136–145)
WBC OTHER # BLD: 15.7 K/UL (ref 4–10.5)

## 2024-12-07 PROCEDURE — 86900 BLOOD TYPING SEROLOGIC ABO: CPT

## 2024-12-07 PROCEDURE — 6360000002 HC RX W HCPCS: Performed by: INTERNAL MEDICINE

## 2024-12-07 PROCEDURE — 6370000000 HC RX 637 (ALT 250 FOR IP): Performed by: SPECIALIST

## 2024-12-07 PROCEDURE — 83735 ASSAY OF MAGNESIUM: CPT

## 2024-12-07 PROCEDURE — 80053 COMPREHEN METABOLIC PANEL: CPT

## 2024-12-07 PROCEDURE — 6370000000 HC RX 637 (ALT 250 FOR IP): Performed by: STUDENT IN AN ORGANIZED HEALTH CARE EDUCATION/TRAINING PROGRAM

## 2024-12-07 PROCEDURE — 86901 BLOOD TYPING SEROLOGIC RH(D): CPT

## 2024-12-07 PROCEDURE — 94003 VENT MGMT INPAT SUBQ DAY: CPT

## 2024-12-07 PROCEDURE — 6360000002 HC RX W HCPCS: Performed by: STUDENT IN AN ORGANIZED HEALTH CARE EDUCATION/TRAINING PROGRAM

## 2024-12-07 PROCEDURE — 84100 ASSAY OF PHOSPHORUS: CPT

## 2024-12-07 PROCEDURE — 2580000003 HC RX 258: Performed by: STUDENT IN AN ORGANIZED HEALTH CARE EDUCATION/TRAINING PROGRAM

## 2024-12-07 PROCEDURE — 2000000000 HC ICU R&B

## 2024-12-07 PROCEDURE — 86923 COMPATIBILITY TEST ELECTRIC: CPT

## 2024-12-07 PROCEDURE — 85610 PROTHROMBIN TIME: CPT

## 2024-12-07 PROCEDURE — 85014 HEMATOCRIT: CPT

## 2024-12-07 PROCEDURE — P9016 RBC LEUKOCYTES REDUCED: HCPCS

## 2024-12-07 PROCEDURE — 85018 HEMOGLOBIN: CPT

## 2024-12-07 PROCEDURE — 30233N1 TRANSFUSION OF NONAUTOLOGOUS RED BLOOD CELLS INTO PERIPHERAL VEIN, PERCUTANEOUS APPROACH: ICD-10-PCS | Performed by: STUDENT IN AN ORGANIZED HEALTH CARE EDUCATION/TRAINING PROGRAM

## 2024-12-07 PROCEDURE — 90935 HEMODIALYSIS ONE EVALUATION: CPT

## 2024-12-07 PROCEDURE — 6370000000 HC RX 637 (ALT 250 FOR IP): Performed by: INTERNAL MEDICINE

## 2024-12-07 PROCEDURE — 82248 BILIRUBIN DIRECT: CPT

## 2024-12-07 PROCEDURE — 86850 RBC ANTIBODY SCREEN: CPT

## 2024-12-07 PROCEDURE — 85027 COMPLETE CBC AUTOMATED: CPT

## 2024-12-07 RX ORDER — ACETAMINOPHEN 500 MG
500 TABLET ORAL EVERY 6 HOURS PRN
Status: DISCONTINUED | OUTPATIENT
Start: 2024-12-07 | End: 2024-12-09 | Stop reason: HOSPADM

## 2024-12-07 RX ORDER — SODIUM CHLORIDE 9 MG/ML
INJECTION, SOLUTION INTRAVENOUS PRN
Status: DISCONTINUED | OUTPATIENT
Start: 2024-12-07 | End: 2024-12-09 | Stop reason: HOSPADM

## 2024-12-07 RX ORDER — MIDODRINE HYDROCHLORIDE 5 MG/1
10 TABLET ORAL ONCE
Status: DISCONTINUED | OUTPATIENT
Start: 2024-12-07 | End: 2024-12-07

## 2024-12-07 RX ADMIN — SUCRALFATE 1 G: 1 TABLET ORAL at 22:50

## 2024-12-07 RX ADMIN — HEPARIN SODIUM 5000 UNITS: 5000 INJECTION INTRAVENOUS; SUBCUTANEOUS at 14:22

## 2024-12-07 RX ADMIN — MIDODRINE HYDROCHLORIDE 15 MG: 5 TABLET ORAL at 04:34

## 2024-12-07 RX ADMIN — MICONAZOLE NITRATE: 2 POWDER TOPICAL at 21:19

## 2024-12-07 RX ADMIN — ALPRAZOLAM 0.5 MG: 0.5 TABLET ORAL at 19:35

## 2024-12-07 RX ADMIN — CARBOXYMETHYLCELLULOSE SODIUM 1 DROP: 10 GEL OPHTHALMIC at 19:35

## 2024-12-07 RX ADMIN — HEPARIN SODIUM 5000 UNITS: 5000 INJECTION INTRAVENOUS; SUBCUTANEOUS at 04:34

## 2024-12-07 RX ADMIN — SUCRALFATE 1 G: 1 TABLET ORAL at 04:34

## 2024-12-07 RX ADMIN — RIFAXIMIN 550 MG: 550 TABLET ORAL at 21:09

## 2024-12-07 RX ADMIN — SUCRALFATE 1 G: 1 TABLET ORAL at 17:42

## 2024-12-07 RX ADMIN — RIFAXIMIN 550 MG: 550 TABLET ORAL at 09:02

## 2024-12-07 RX ADMIN — LANSOPRAZOLE 30 MG: 30 TABLET, ORALLY DISINTEGRATING ORAL at 04:34

## 2024-12-07 RX ADMIN — TRAZODONE HYDROCHLORIDE 50 MG: 50 TABLET ORAL at 21:01

## 2024-12-07 RX ADMIN — SODIUM CHLORIDE, PRESERVATIVE FREE 10 ML: 5 INJECTION INTRAVENOUS at 09:03

## 2024-12-07 RX ADMIN — SODIUM CHLORIDE, PRESERVATIVE FREE 10 ML: 5 INJECTION INTRAVENOUS at 21:19

## 2024-12-07 RX ADMIN — OXYCODONE HYDROCHLORIDE 5 MG: 5 TABLET ORAL at 14:22

## 2024-12-07 RX ADMIN — MIDODRINE HYDROCHLORIDE 15 MG: 5 TABLET ORAL at 11:00

## 2024-12-07 RX ADMIN — MICONAZOLE NITRATE: 2 POWDER TOPICAL at 09:03

## 2024-12-07 RX ADMIN — LACTULOSE 20 G: 20 SOLUTION ORAL at 21:01

## 2024-12-07 RX ADMIN — ACETAMINOPHEN 500 MG: 500 TABLET ORAL at 21:01

## 2024-12-07 RX ADMIN — OXYCODONE HYDROCHLORIDE 5 MG: 5 TABLET ORAL at 09:02

## 2024-12-07 RX ADMIN — ATORVASTATIN CALCIUM 40 MG: 40 TABLET, FILM COATED ORAL at 21:01

## 2024-12-07 RX ADMIN — LACTULOSE 20 G: 20 SOLUTION ORAL at 14:22

## 2024-12-07 RX ADMIN — MIDODRINE HYDROCHLORIDE 15 MG: 5 TABLET ORAL at 17:42

## 2024-12-07 RX ADMIN — LACTULOSE 20 G: 20 SOLUTION ORAL at 09:03

## 2024-12-07 RX ADMIN — GABAPENTIN 100 MG: 100 CAPSULE ORAL at 09:02

## 2024-12-07 RX ADMIN — EPOETIN ALFA-EPBX 10000 UNITS: 10000 INJECTION, SOLUTION INTRAVENOUS; SUBCUTANEOUS at 09:03

## 2024-12-07 RX ADMIN — CEFEPIME 1000 MG: 1 INJECTION, POWDER, FOR SOLUTION INTRAMUSCULAR; INTRAVENOUS at 17:44

## 2024-12-07 RX ADMIN — HEPARIN SODIUM 5000 UNITS: 5000 INJECTION INTRAVENOUS; SUBCUTANEOUS at 22:16

## 2024-12-07 ASSESSMENT — PULMONARY FUNCTION TESTS
PIF_VALUE: 25
PIF_VALUE: 20
PIF_VALUE: 24
PIF_VALUE: 24
PIF_VALUE: 25
PIF_VALUE: 24
PIF_VALUE: 24
PIF_VALUE: 26
PIF_VALUE: 20
PIF_VALUE: 22
PIF_VALUE: 26
PIF_VALUE: 21
PIF_VALUE: 24
PIF_VALUE: 21
PIF_VALUE: 23
PIF_VALUE: 21
PIF_VALUE: 23
PIF_VALUE: 28
PIF_VALUE: 23
PIF_VALUE: 25
PIF_VALUE: 21
PIF_VALUE: 23
PIF_VALUE: 21
PIF_VALUE: 20
PIF_VALUE: 21
PIF_VALUE: 29
PIF_VALUE: 25
PIF_VALUE: 24
PIF_VALUE: 21
PIF_VALUE: 25
PIF_VALUE: 24
PIF_VALUE: 20
PIF_VALUE: 21
PIF_VALUE: 21
PIF_VALUE: 23
PIF_VALUE: 26
PIF_VALUE: 20
PIF_VALUE: 21
PIF_VALUE: 25
PIF_VALUE: 19
PIF_VALUE: 21
PIF_VALUE: 23
PIF_VALUE: 25
PIF_VALUE: 23
PIF_VALUE: 19

## 2024-12-07 ASSESSMENT — PAIN SCALES - WONG BAKER
WONGBAKER_NUMERICALRESPONSE: HURTS A LITTLE BIT

## 2024-12-07 ASSESSMENT — PAIN DESCRIPTION - LOCATION
LOCATION: BACK
LOCATION: BACK
LOCATION: GENERALIZED
LOCATION: BACK
LOCATION: BACK

## 2024-12-07 ASSESSMENT — PAIN DESCRIPTION - DESCRIPTORS
DESCRIPTORS: ACHING
DESCRIPTORS: ACHING;DISCOMFORT
DESCRIPTORS: ACHING
DESCRIPTORS: ACHING

## 2024-12-07 ASSESSMENT — PAIN SCALES - GENERAL
PAINLEVEL_OUTOF10: 8
PAINLEVEL_OUTOF10: 5
PAINLEVEL_OUTOF10: 10
PAINLEVEL_OUTOF10: 9
PAINLEVEL_OUTOF10: 9

## 2024-12-07 ASSESSMENT — PAIN DESCRIPTION - ORIENTATION
ORIENTATION: MID;POSTERIOR;RIGHT;LEFT
ORIENTATION: POSTERIOR
ORIENTATION: POSTERIOR

## 2024-12-07 ASSESSMENT — PAIN DESCRIPTION - PAIN TYPE
TYPE: CHRONIC PAIN
TYPE: CHRONIC PAIN

## 2024-12-07 ASSESSMENT — PAIN DESCRIPTION - FREQUENCY
FREQUENCY: CONTINUOUS
FREQUENCY: CONTINUOUS

## 2024-12-07 ASSESSMENT — PAIN DESCRIPTION - ONSET
ONSET: ON-GOING
ONSET: ON-GOING

## 2024-12-07 NOTE — CONSULTS
[facility] Nurse  Consult Note       Tico Day  AGE: 64 y.o.   GENDER: male  : 1960  TODAY'S DATE:  12/3/2024    Subjective:     Reason for CWOCN Evaluation and Assessment: wound assessment      Tico Day is a 64 y.o. male referred by:   [x] Physician  [] Nursing  [] Other:     Wound Identification:  Wound Type: pressure and MASD-intertriginous and IAD, traumatic  Contributing Factors: chronic pressure, decreased mobility, obesity, decreased tissue oxygenation, malnutrition, incontinence of stool        PAST MEDICAL HISTORY        Diagnosis Date    Acute and chronic respiratory failure with hypoxia     Anxiety     COPD (chronic obstructive pulmonary disease) (HCC)     DM type 2 (diabetes mellitus, type 2) (HCC)     Dysphagia, oropharyngeal     End stage renal disease (HCC)     Malnutrition of moderate degree (HCC)     Morbid obesity     Sleep apnea     Ventilator dependent (HCC)        PAST SURGICAL HISTORY    Past Surgical History:   Procedure Laterality Date    IR TUNNELED CATHETER PLACEMENT GREATER THAN 5 YEARS  2024    IR TUNNELED CATHETER PLACEMENT GREATER THAN 5 YEARS 2024 [facility] SPECIAL PROCEDURES       FAMILY HISTORY    History reviewed. No pertinent family history.    SOCIAL HISTORY    Social History     Tobacco Use    Smoking status: Former     Current packs/day: 0.50     Average packs/day: 0.5 packs/day for 32.7 years (16.4 ttl pk-yrs)     Types: Cigarettes     Start date: 3/7/1992     Passive exposure: Never    Smokeless tobacco: Never   Substance Use Topics    Alcohol use: Never       ALLERGIES    Allergies   Allergen Reactions    Reglan [Metoclopramide]     Remeron [Mirtazapine]        MEDICATIONS    No current facility-administered medications on file prior to encounter.     Current Outpatient Medications on File Prior to Encounter   Medication Sig Dispense Refill    acetaminophen (TYLENOL) 500 MG tablet 2 tablets by Enteral route every 8 hours as 
Central line rounding completed. No dressing change indicated. Dressing is clean, dry, and intact. Patient continues to meet the following criteria for central vascular access: Hemodialysis    Consult the Vascular Access Team for questions, concerns, or change in patient's condition.   
PICC consult completed.     Indication for line: Irritant/vesicant medication  Medication/Anticipated Duration: Levophed   Ordering Provider: Dr. De La Cruz    PICC LINE STATUS: PICC LINE READY TO USE    PICC line insertion education provided to patient, risks and benefits discussed and reviewed with patient. Patient verbalized understanding, questions answered. Patient unable to sign consent, two RN's sign for medical necessity. Time out done @ 1200. Arrowg+eric Blue Advance ® Double lumen PICC line inserted into RUE brachial vein x 1 attempt using sterile ultrasound and modified Seldinger technique without difficulty per protocol.Correct PICC placement in the lower 1/3 of the SVC-CAJ verified with the Arrow® VPS G4™ vascular positioning system device.  Brisk blood return noted and flushes without resistance on its lumens. Patient tolerated procedure well. Ultrasound photos of selected vein diameter provided below. Printed copy of Blue Bullseye confirming correct PICC line placement placed in chart. Primary nurse notified and aware.    Consult the Vascular Access Team for questions, concerns, or change in patient's condition.                
Presentation to room to assess HD line.  Dressing CDI, changed by primary nurse, flowsheet updated.  
Weekly PICC dressing change completed per protocol. Patient tolerated well.   Patient continues to meet the following requirement for continued central vascular access device placement: Irritant/vesicant medication    Consult the Vascular Access Team for questions, concerns, or change in patient's condition.   
obstructive pulmonary disease) (HCC)     DM type 2 (diabetes mellitus, type 2) (HCC)     Dysphagia, oropharyngeal     End stage renal disease (HCC)     Malnutrition of moderate degree (HCC)     Morbid obesity     Sleep apnea     Ventilator dependent (HCC)        Past Surgical History:        Procedure Laterality Date    IR TUNNELED CATHETER PLACEMENT GREATER THAN 5 YEARS  11/6/2024    IR TUNNELED CATHETER PLACEMENT GREATER THAN 5 YEARS 11/6/2024 [facility] SPECIAL PROCEDURES       Medications:   Prior to Admission medications    Medication Sig Start Date End Date Taking? Authorizing Provider   acetaminophen (TYLENOL) 500 MG tablet 2 tablets by Enteral route every 8 hours as needed 8/6/24   Harsh Malhotra MD   albuterol (PROVENTIL) (2.5 MG/3ML) 0.083% nebulizer solution Inhale 3 mLs into the lungs every 4 hours 8/6/24   Harsh Malhotra MD   atorvastatin (LIPITOR) 40 MG tablet 1 tablet by Enteral route nightly 5/20/24   Harsh Malhotra MD   gabapentin (NEURONTIN) 100 MG capsule 1 capsule by Per G Tube route daily.    Harsh Malhotra MD   loperamide (IMODIUM) 2 MG capsule Take 1 capsule by mouth as needed for Diarrhea 8/6/24   Harsh Malhotra MD   melatonin 3 MG TABS tablet Take 1 tablet by mouth nightly as needed (insomnia) 6/24/24   Harsh Malhotra MD   midodrine (PROAMATINE) 10 MG tablet Take 1 tablet by mouth as needed (Every monday, wednesday, and friday for hypotension) 6/24/24   Harsh Malhotra MD   oxyCODONE (ROXICODONE) 5 MG immediate release tablet 1 tablet by Enteral route every 8 hours as needed. Max Daily Amount: 15 mg 8/6/24   Harsh Malhotra MD   lansoprazole (PREVACID SOLUTAB) 30 MG disintegrating tablet 1 tablet by Enteral route daily 8/7/24   Harsh Malhotra MD   sennosides (SENOKOT) 8.8 MG/5ML syrup 5 mLs by Enteral route daily as needed 8/6/24   Harsh Malhotra MD   sodium chloride, Inhalant, 7 % nebulizer solution Inhale 4 mLs into the lungs in 
Year: 1     Homeless in the Last Year: No        Family History: reviewed and positives included in HPI- all other pertinent GI family history negative      REVIEW OF SYSTEMS: see HPI for positives and pertinent negatives. All other systems reviewed and are negative    PHYSICAL EXAM:    Vitals:  BP (!) 78/49   Pulse 77   Temp 98.4 °F (36.9 °C) (Oral)   Resp 15   Wt 102.1 kg (225 lb)   SpO2 96%   BMI 33.23 kg/m²   Constitutional: Patient is in no distress.   Eyes: Pupils equal, round.  No icterus.  HENT: Oral mucosa is moist.  Tracheostomy noted  Pulmonary: No accessory muscle use. No localizing pulmonary findings.     Cardiovascular: Heart has a regular rate and rhythm, no JVD.   Gastrointestinal: PEG tube in place bowel sounds are present in all four quadrants. Abdomen is soft, nontender, nondistended. Rectal examination deferred.   Skin: No jaundice, spider angiomas, or palmar erythema. No purpura.  Neuro:No gross focal neurologic deficits.    MELD 3.0: 25 at 11/30/2024  6:50 AM  MELD-Na: 24 at 11/30/2024  6:50 AM  Calculated from:  Serum Creatinine: 2.0 mg/dL at 11/30/2024  6:50 AM  Serum Sodium: 126 mmol/L at 11/30/2024  6:50 AM  Total Bilirubin: 0.3 mg/dL (Using min of 1 mg/dL) at 11/30/2024  6:50 AM  Serum Albumin: 2.0 g/dL at 11/30/2024  6:50 AM  INR(ratio): 1.2 at 11/30/2024  6:50 AM  Age at listing (hypothetical): 64 years  Sex: Male at 11/30/2024  6:50 AM        IMPRESSION/RECOMMENDATIONS:  1) decompensated cirrhosis  MELD 3.0 25  Patient with significant ascites as well as pleural effusion.  Agree with IR guided thoracentesis and paracentesis.  IV albumin 25 gm daily for 3 days    CIRRHOSIS DATABASE:  A.  Varices surveillance/prophylaxis-last EGD with Dr. Khan at [facility] with some oozing AVMs in August 2024.  No mention of esophageal or GAVE varices  B.  Ascites-large amount of ascites noted, patient is ESRD on dialysis not a candidate for diuretics.  IR guided Thora and para 
to patient's size, iterative reconstruction. COMPARISON: CT of the abdomen and pelvis dated October 26, 2024 FINDINGS: - LUNGS: Prominent bilateral interstitial markings with large bilateral pleural effusion with compressive atelectasis. Underlying infection cannot be completely excluded. Suspected loculation of the left pleural effusion versus empyema. No pneumothorax is demonstrated. - MEDIASTINUM/AXILLA: Prominent mediastinal lymph nodes. No axillary or hilar adenopathy is demonstrated. - HEART/VESSELS: Cardiomegaly. No pericardial effusion. Coronary artery calcifications. Atherosclerotic calcification of the thoracic aortic arch. - CHEST WALL: Tracheostomy tube is in place. Left-sided central line terminating within the SVC. - LIVER: Nodularity of the liver contour, consistent with cirrhosis. - GALLBLADDER/BILE DUCTS: No gallstones or bile duct dilatation. - PANCREAS: Fatty atrophy of the pancreas. - SPLEEN: Granulomatous disease of the spleen. - ADRENALS:  Normal. - KIDNEYS/URETERS: Atrophic kidneys with tiny nonobstructing left renal calculi. No evidence of hydronephrosis. - BLADDER: Urinary bladder is completely decompressed. - REPRODUCTIVE ORGANS: No pelvic masses. - BOWEL: Evaluation of the bowel loops are limited due to lack of oral and IV contrast. Residual oral contrast material is demonstrated within the large bowel. A G-tube is demonstrated. The stomach appears grossly unremarkable. Prominent loops of small bowel, no wall thickening or adjacent stranding. Large bowel appears grossly unremarkable. The appendix is not clearly visualized. - LYMPH NODES: Prominent mesenteric lymph nodes. - BONES: Degenerative changes of the thoracolumbar spine. - VASCULATURE: Mild atherosclerotic calcification of the abdominal aorta and branches. - OTHER: Massive intra-abdominal ascites. Right upper quadrant coils are demonstrated.     1. Large bilateral pleural effusions with compressive atelectasis. Underlying infection

## 2024-12-08 LAB
ABO/RH: NORMAL
ALBUMIN SERPL-MCNC: 2.5 G/DL (ref 3.4–5)
ALBUMIN SERPL-MCNC: 2.5 G/DL (ref 3.4–5)
ALBUMIN/GLOB SERPL: 0.6 {RATIO} (ref 1.1–2.2)
ALBUMIN/GLOB SERPL: 0.6 {RATIO} (ref 1.1–2.2)
ALP SERPL-CCNC: 141 U/L (ref 40–129)
ALP SERPL-CCNC: 147 U/L (ref 40–129)
ALT SERPL-CCNC: 10 U/L (ref 10–40)
ALT SERPL-CCNC: 9 U/L (ref 10–40)
ANION GAP SERPL CALCULATED.3IONS-SCNC: 6 MMOL/L (ref 9–17)
ANTIBODY SCREEN: NEGATIVE
AST SERPL-CCNC: 24 U/L (ref 15–37)
AST SERPL-CCNC: 26 U/L (ref 15–37)
BASOPHILS # BLD: 0.03 K/UL
BASOPHILS NFR BLD: 0 % (ref 0–1)
BILIRUB DIRECT SERPL-MCNC: 0.3 MG/DL (ref 0–0.3)
BILIRUB DIRECT SERPL-MCNC: <0.2 MG/DL (ref 0–0.3)
BILIRUB INDIRECT SERPL-MCNC: 0.3 MG/DL (ref 0–0.7)
BILIRUB INDIRECT SERPL-MCNC: ABNORMAL MG/DL (ref 0–0.7)
BILIRUB SERPL-MCNC: 0.4 MG/DL (ref 0–1)
BILIRUB SERPL-MCNC: 0.5 MG/DL (ref 0–1)
BLOOD BANK BLOOD PRODUCT EXPIRATION DATE: NORMAL
BLOOD BANK COMMENT: NORMAL
BLOOD BANK DISPENSE STATUS: NORMAL
BLOOD BANK ISBT PRODUCT BLOOD TYPE: 6200
BLOOD BANK PRODUCT CODE: NORMAL
BLOOD BANK SAMPLE EXPIRATION: NORMAL
BLOOD BANK UNIT TYPE AND RH: NORMAL
BPU ID: NORMAL
BUN SERPL-MCNC: 27 MG/DL (ref 7–20)
CALCIUM SERPL-MCNC: 12.3 MG/DL (ref 8.3–10.6)
CHLORIDE SERPL-SCNC: 95 MMOL/L (ref 99–110)
CO2 SERPL-SCNC: 27 MMOL/L (ref 21–32)
COMPONENT: NORMAL
CREAT SERPL-MCNC: 2.3 MG/DL (ref 0.8–1.3)
CROSSMATCH RESULT: NORMAL
EOSINOPHIL # BLD: 0.38 K/UL
EOSINOPHILS RELATIVE PERCENT: 4 % (ref 0–3)
ERYTHROCYTE [DISTWIDTH] IN BLOOD BY AUTOMATED COUNT: 17.9 % (ref 11.7–14.9)
GFR, ESTIMATED: 29 ML/MIN/1.73M2
GLUCOSE SERPL-MCNC: 87 MG/DL (ref 74–99)
HCT VFR BLD AUTO: 24.9 % (ref 42–52)
HGB BLD-MCNC: 7.6 G/DL (ref 13.5–18)
IMM GRANULOCYTES # BLD AUTO: 0.08 K/UL
IMM GRANULOCYTES NFR BLD: 1 %
INR PPP: 1.1
LYMPHOCYTES NFR BLD: 1.19 K/UL
LYMPHOCYTES RELATIVE PERCENT: 11 % (ref 24–44)
MAGNESIUM SERPL-MCNC: 2.4 MG/DL (ref 1.8–2.4)
MCH RBC QN AUTO: 28.7 PG (ref 27–31)
MCHC RBC AUTO-ENTMCNC: 30.5 G/DL (ref 32–36)
MCV RBC AUTO: 94 FL (ref 78–100)
MONOCYTES NFR BLD: 0.82 K/UL
MONOCYTES NFR BLD: 8 % (ref 0–4)
NEUTROPHILS NFR BLD: 77 % (ref 36–66)
NEUTS SEG NFR BLD: 8.34 K/UL
PHOSPHATE SERPL-MCNC: 2.5 MG/DL (ref 2.5–4.9)
PLATELET # BLD AUTO: 177 K/UL (ref 140–440)
PMV BLD AUTO: 8.8 FL (ref 7.5–11.1)
POTASSIUM SERPL-SCNC: 3.3 MMOL/L (ref 3.5–5.1)
PROT SERPL-MCNC: 6.8 G/DL (ref 6.4–8.2)
PROT SERPL-MCNC: 7 G/DL (ref 6.4–8.2)
PROTHROMBIN TIME: 14.8 SEC (ref 11.7–14.5)
RBC # BLD AUTO: 2.65 M/UL (ref 4.6–6.2)
SODIUM SERPL-SCNC: 128 MMOL/L (ref 136–145)
TRANSFUSION STATUS: NORMAL
UNIT DIVISION: 0
UNIT ISSUE DATE/TIME: NORMAL
WBC OTHER # BLD: 10.8 K/UL (ref 4–10.5)

## 2024-12-08 PROCEDURE — 85025 COMPLETE CBC W/AUTO DIFF WBC: CPT

## 2024-12-08 PROCEDURE — 6370000000 HC RX 637 (ALT 250 FOR IP): Performed by: INTERNAL MEDICINE

## 2024-12-08 PROCEDURE — 85610 PROTHROMBIN TIME: CPT

## 2024-12-08 PROCEDURE — 2580000003 HC RX 258: Performed by: STUDENT IN AN ORGANIZED HEALTH CARE EDUCATION/TRAINING PROGRAM

## 2024-12-08 PROCEDURE — 6370000000 HC RX 637 (ALT 250 FOR IP): Performed by: STUDENT IN AN ORGANIZED HEALTH CARE EDUCATION/TRAINING PROGRAM

## 2024-12-08 PROCEDURE — 94003 VENT MGMT INPAT SUBQ DAY: CPT

## 2024-12-08 PROCEDURE — 2000000000 HC ICU R&B

## 2024-12-08 PROCEDURE — 80053 COMPREHEN METABOLIC PANEL: CPT

## 2024-12-08 PROCEDURE — 83735 ASSAY OF MAGNESIUM: CPT

## 2024-12-08 PROCEDURE — 94761 N-INVAS EAR/PLS OXIMETRY MLT: CPT

## 2024-12-08 PROCEDURE — 6360000002 HC RX W HCPCS: Performed by: STUDENT IN AN ORGANIZED HEALTH CARE EDUCATION/TRAINING PROGRAM

## 2024-12-08 PROCEDURE — 36415 COLL VENOUS BLD VENIPUNCTURE: CPT

## 2024-12-08 PROCEDURE — 2500000003 HC RX 250 WO HCPCS: Performed by: STUDENT IN AN ORGANIZED HEALTH CARE EDUCATION/TRAINING PROGRAM

## 2024-12-08 PROCEDURE — 2700000000 HC OXYGEN THERAPY PER DAY

## 2024-12-08 PROCEDURE — 82248 BILIRUBIN DIRECT: CPT

## 2024-12-08 PROCEDURE — 89220 SPUTUM SPECIMEN COLLECTION: CPT

## 2024-12-08 PROCEDURE — 84100 ASSAY OF PHOSPHORUS: CPT

## 2024-12-08 PROCEDURE — 6370000000 HC RX 637 (ALT 250 FOR IP): Performed by: SPECIALIST

## 2024-12-08 RX ADMIN — MIDODRINE HYDROCHLORIDE 15 MG: 5 TABLET ORAL at 09:35

## 2024-12-08 RX ADMIN — MIDODRINE HYDROCHLORIDE 15 MG: 5 TABLET ORAL at 18:41

## 2024-12-08 RX ADMIN — LACTULOSE 20 G: 20 SOLUTION ORAL at 09:35

## 2024-12-08 RX ADMIN — OXYCODONE HYDROCHLORIDE 5 MG: 5 TABLET ORAL at 01:46

## 2024-12-08 RX ADMIN — MICONAZOLE NITRATE: 2 POWDER TOPICAL at 21:05

## 2024-12-08 RX ADMIN — HEPARIN SODIUM 5000 UNITS: 5000 INJECTION INTRAVENOUS; SUBCUTANEOUS at 05:34

## 2024-12-08 RX ADMIN — SUCRALFATE 1 G: 1 TABLET ORAL at 16:06

## 2024-12-08 RX ADMIN — ALPRAZOLAM 0.5 MG: 0.5 TABLET ORAL at 05:34

## 2024-12-08 RX ADMIN — ACETAMINOPHEN 500 MG: 500 TABLET ORAL at 20:22

## 2024-12-08 RX ADMIN — SUCRALFATE 1 G: 1 TABLET ORAL at 06:35

## 2024-12-08 RX ADMIN — ALPRAZOLAM 0.5 MG: 0.5 TABLET ORAL at 19:26

## 2024-12-08 RX ADMIN — LACTULOSE 20 G: 20 SOLUTION ORAL at 16:06

## 2024-12-08 RX ADMIN — MICONAZOLE NITRATE: 2 POWDER TOPICAL at 09:00

## 2024-12-08 RX ADMIN — RIFAXIMIN 550 MG: 550 TABLET ORAL at 21:04

## 2024-12-08 RX ADMIN — OXYCODONE HYDROCHLORIDE 5 MG: 5 TABLET ORAL at 10:21

## 2024-12-08 RX ADMIN — MIDODRINE HYDROCHLORIDE 15 MG: 5 TABLET ORAL at 03:35

## 2024-12-08 RX ADMIN — OXYCODONE HYDROCHLORIDE 5 MG: 5 TABLET ORAL at 17:04

## 2024-12-08 RX ADMIN — SODIUM CHLORIDE, PRESERVATIVE FREE 10 ML: 5 INJECTION INTRAVENOUS at 21:06

## 2024-12-08 RX ADMIN — Medication 5 MCG/MIN: at 18:33

## 2024-12-08 RX ADMIN — HEPARIN SODIUM 5000 UNITS: 5000 INJECTION INTRAVENOUS; SUBCUTANEOUS at 22:41

## 2024-12-08 RX ADMIN — OXYCODONE HYDROCHLORIDE 5 MG: 5 TABLET ORAL at 17:35

## 2024-12-08 RX ADMIN — LANSOPRAZOLE 30 MG: 30 TABLET, ORALLY DISINTEGRATING ORAL at 05:34

## 2024-12-08 RX ADMIN — SODIUM CHLORIDE, PRESERVATIVE FREE 10 ML: 5 INJECTION INTRAVENOUS at 09:36

## 2024-12-08 RX ADMIN — GABAPENTIN 100 MG: 100 CAPSULE ORAL at 09:35

## 2024-12-08 RX ADMIN — ATORVASTATIN CALCIUM 40 MG: 40 TABLET, FILM COATED ORAL at 21:04

## 2024-12-08 RX ADMIN — HEPARIN SODIUM 5000 UNITS: 5000 INJECTION INTRAVENOUS; SUBCUTANEOUS at 16:06

## 2024-12-08 RX ADMIN — CARBOXYMETHYLCELLULOSE SODIUM 1 DROP: 10 GEL OPHTHALMIC at 21:05

## 2024-12-08 RX ADMIN — ALPRAZOLAM 0.5 MG: 0.5 TABLET ORAL at 13:09

## 2024-12-08 RX ADMIN — SUCRALFATE 1 G: 1 TABLET ORAL at 22:41

## 2024-12-08 RX ADMIN — CEFEPIME 1000 MG: 1 INJECTION, POWDER, FOR SOLUTION INTRAMUSCULAR; INTRAVENOUS at 18:43

## 2024-12-08 RX ADMIN — Medication 5 MCG/MIN: at 01:01

## 2024-12-08 RX ADMIN — TRAZODONE HYDROCHLORIDE 50 MG: 50 TABLET ORAL at 21:04

## 2024-12-08 RX ADMIN — LACTULOSE 20 G: 20 SOLUTION ORAL at 21:04

## 2024-12-08 RX ADMIN — RIFAXIMIN 550 MG: 550 TABLET ORAL at 09:35

## 2024-12-08 ASSESSMENT — PAIN SCALES - GENERAL
PAINLEVEL_OUTOF10: 6
PAINLEVEL_OUTOF10: 6
PAINLEVEL_OUTOF10: 9
PAINLEVEL_OUTOF10: 8
PAINLEVEL_OUTOF10: 10
PAINLEVEL_OUTOF10: 8
PAINLEVEL_OUTOF10: 9
PAINLEVEL_OUTOF10: 8

## 2024-12-08 ASSESSMENT — PAIN DESCRIPTION - LOCATION
LOCATION: COCCYX;BACK
LOCATION: COCCYX;BACK

## 2024-12-08 ASSESSMENT — PAIN - FUNCTIONAL ASSESSMENT
PAIN_FUNCTIONAL_ASSESSMENT: PREVENTS OR INTERFERES SOME ACTIVE ACTIVITIES AND ADLS
PAIN_FUNCTIONAL_ASSESSMENT: PREVENTS OR INTERFERES WITH MANY ACTIVE NOT PASSIVE ACTIVITIES

## 2024-12-08 ASSESSMENT — PULMONARY FUNCTION TESTS
PIF_VALUE: 23
PIF_VALUE: 18
PIF_VALUE: 23
PIF_VALUE: 15
PIF_VALUE: 23
PIF_VALUE: 23
PIF_VALUE: 28
PIF_VALUE: 18
PIF_VALUE: 24
PIF_VALUE: 25
PIF_VALUE: 27
PIF_VALUE: 30

## 2024-12-08 ASSESSMENT — PAIN DESCRIPTION - ORIENTATION: ORIENTATION: MID;LOWER

## 2024-12-08 NOTE — CONSENT
Informed Consent for Blood Component Transfusion Note    I have discussed with the spouse the rationale for blood component transfusion; its benefits in treating or preventing fatigue, organ damage, or death; and its risk which includes mild transfusion reactions, rare risk of blood borne infection, or more serious but rare reactions. I have discussed the alternatives to transfusion, including the risk and consequences of not receiving transfusion. The spouse had an opportunity to ask questions and had agreed to proceed with transfusion of blood components.    Electronically signed by Gabriele Nobles Jr, DO on 12/8/24 at 9:15 AM EST

## 2024-12-09 VITALS
HEART RATE: 92 BPM | BODY MASS INDEX: 27.76 KG/M2 | TEMPERATURE: 99.1 F | SYSTOLIC BLOOD PRESSURE: 99 MMHG | HEIGHT: 69 IN | RESPIRATION RATE: 19 BRPM | WEIGHT: 187.39 LBS | DIASTOLIC BLOOD PRESSURE: 62 MMHG | OXYGEN SATURATION: 98 %

## 2024-12-09 LAB
ALBUMIN SERPL-MCNC: 2.5 G/DL (ref 3.4–5)
ALBUMIN/GLOB SERPL: 0.5 {RATIO} (ref 1.1–2.2)
ALP SERPL-CCNC: 137 U/L (ref 40–129)
ALT SERPL-CCNC: 9 U/L (ref 10–40)
ANION GAP SERPL CALCULATED.3IONS-SCNC: 7 MMOL/L (ref 9–17)
AST SERPL-CCNC: 25 U/L (ref 15–37)
BASOPHILS # BLD: 0.05 K/UL
BASOPHILS NFR BLD: 1 % (ref 0–1)
BILIRUB DIRECT SERPL-MCNC: 0.3 MG/DL (ref 0–0.3)
BILIRUB INDIRECT SERPL-MCNC: 0.3 MG/DL (ref 0–0.7)
BILIRUB SERPL-MCNC: 0.6 MG/DL (ref 0–1)
BUN SERPL-MCNC: 37 MG/DL (ref 7–20)
CALCIUM SERPL-MCNC: 13.6 MG/DL (ref 8.3–10.6)
CHLORIDE SERPL-SCNC: 94 MMOL/L (ref 99–110)
CO2 SERPL-SCNC: 26 MMOL/L (ref 21–32)
CREAT SERPL-MCNC: 2.8 MG/DL (ref 0.8–1.3)
EOSINOPHIL # BLD: 0.37 K/UL
EOSINOPHILS RELATIVE PERCENT: 5 % (ref 0–3)
ERYTHROCYTE [DISTWIDTH] IN BLOOD BY AUTOMATED COUNT: 17.6 % (ref 11.7–14.9)
GFR, ESTIMATED: 23 ML/MIN/1.73M2
GLUCOSE SERPL-MCNC: 68 MG/DL (ref 74–99)
HCT VFR BLD AUTO: 25.5 % (ref 42–52)
HGB BLD-MCNC: 7.8 G/DL (ref 13.5–18)
IMM GRANULOCYTES # BLD AUTO: 0.05 K/UL
IMM GRANULOCYTES NFR BLD: 1 %
INR PPP: 1.2
LYMPHOCYTES NFR BLD: 1.11 K/UL
LYMPHOCYTES RELATIVE PERCENT: 15 % (ref 24–44)
MAGNESIUM SERPL-MCNC: 2.5 MG/DL (ref 1.8–2.4)
MCH RBC QN AUTO: 29 PG (ref 27–31)
MCHC RBC AUTO-ENTMCNC: 30.6 G/DL (ref 32–36)
MCV RBC AUTO: 94.8 FL (ref 78–100)
MONOCYTES NFR BLD: 0.59 K/UL
MONOCYTES NFR BLD: 8 % (ref 0–4)
NEUTROPHILS NFR BLD: 71 % (ref 36–66)
NEUTS SEG NFR BLD: 5.2 K/UL
PHOSPHATE SERPL-MCNC: 3.3 MG/DL (ref 2.5–4.9)
PLATELET # BLD AUTO: 173 K/UL (ref 140–440)
PMV BLD AUTO: 8.8 FL (ref 7.5–11.1)
POTASSIUM SERPL-SCNC: 3.5 MMOL/L (ref 3.5–5.1)
PROT SERPL-MCNC: 7.1 G/DL (ref 6.4–8.2)
PROTHROMBIN TIME: 15.2 SEC (ref 11.7–14.5)
RBC # BLD AUTO: 2.69 M/UL (ref 4.6–6.2)
SODIUM SERPL-SCNC: 127 MMOL/L (ref 136–145)
WBC OTHER # BLD: 7.4 K/UL (ref 4–10.5)

## 2024-12-09 PROCEDURE — 31502 CHANGE OF WINDPIPE AIRWAY: CPT

## 2024-12-09 PROCEDURE — 83735 ASSAY OF MAGNESIUM: CPT

## 2024-12-09 PROCEDURE — 6360000002 HC RX W HCPCS: Performed by: STUDENT IN AN ORGANIZED HEALTH CARE EDUCATION/TRAINING PROGRAM

## 2024-12-09 PROCEDURE — 80053 COMPREHEN METABOLIC PANEL: CPT

## 2024-12-09 PROCEDURE — 94761 N-INVAS EAR/PLS OXIMETRY MLT: CPT

## 2024-12-09 PROCEDURE — 6370000000 HC RX 637 (ALT 250 FOR IP): Performed by: SPECIALIST

## 2024-12-09 PROCEDURE — 90935 HEMODIALYSIS ONE EVALUATION: CPT

## 2024-12-09 PROCEDURE — 94003 VENT MGMT INPAT SUBQ DAY: CPT

## 2024-12-09 PROCEDURE — 2580000003 HC RX 258: Performed by: STUDENT IN AN ORGANIZED HEALTH CARE EDUCATION/TRAINING PROGRAM

## 2024-12-09 PROCEDURE — 6370000000 HC RX 637 (ALT 250 FOR IP): Performed by: NURSE PRACTITIONER

## 2024-12-09 PROCEDURE — 6370000000 HC RX 637 (ALT 250 FOR IP): Performed by: STUDENT IN AN ORGANIZED HEALTH CARE EDUCATION/TRAINING PROGRAM

## 2024-12-09 PROCEDURE — 82248 BILIRUBIN DIRECT: CPT

## 2024-12-09 PROCEDURE — 85610 PROTHROMBIN TIME: CPT

## 2024-12-09 PROCEDURE — 2700000000 HC OXYGEN THERAPY PER DAY

## 2024-12-09 PROCEDURE — 85025 COMPLETE CBC W/AUTO DIFF WBC: CPT

## 2024-12-09 PROCEDURE — 89220 SPUTUM SPECIMEN COLLECTION: CPT

## 2024-12-09 PROCEDURE — 84100 ASSAY OF PHOSPHORUS: CPT

## 2024-12-09 PROCEDURE — 6370000000 HC RX 637 (ALT 250 FOR IP): Performed by: INTERNAL MEDICINE

## 2024-12-09 RX ORDER — ALPRAZOLAM 0.5 MG
1 TABLET ORAL ONCE
Status: COMPLETED | OUTPATIENT
Start: 2024-12-09 | End: 2024-12-09

## 2024-12-09 RX ORDER — OXYCODONE HYDROCHLORIDE 5 MG/1
5 TABLET ORAL EVERY 4 HOURS PRN
Qty: 12 TABLET | Refills: 0
Start: 2024-12-09 | End: 2024-12-12

## 2024-12-09 RX ORDER — ONDANSETRON 4 MG/1
4 TABLET, ORALLY DISINTEGRATING ORAL EVERY 8 HOURS PRN
Qty: 30 TABLET | Refills: 0
Start: 2024-12-09

## 2024-12-09 RX ORDER — LACTULOSE 10 G/15ML
20 SOLUTION ORAL 3 TIMES DAILY
Qty: 100 ML | Refills: 1
Start: 2024-12-09

## 2024-12-09 RX ORDER — MIDODRINE HYDROCHLORIDE 5 MG/1
15 TABLET ORAL EVERY 8 HOURS
Qty: 90 TABLET | Refills: 3
Start: 2024-12-10

## 2024-12-09 RX ADMIN — MIDODRINE HYDROCHLORIDE 15 MG: 5 TABLET ORAL at 13:36

## 2024-12-09 RX ADMIN — OXYCODONE HYDROCHLORIDE 5 MG: 5 TABLET ORAL at 15:07

## 2024-12-09 RX ADMIN — MIDODRINE HYDROCHLORIDE 15 MG: 5 TABLET ORAL at 03:36

## 2024-12-09 RX ADMIN — SODIUM CHLORIDE, PRESERVATIVE FREE 10 ML: 5 INJECTION INTRAVENOUS at 08:32

## 2024-12-09 RX ADMIN — MICONAZOLE NITRATE: 2 POWDER TOPICAL at 08:32

## 2024-12-09 RX ADMIN — CEFEPIME 1000 MG: 1 INJECTION, POWDER, FOR SOLUTION INTRAMUSCULAR; INTRAVENOUS at 18:43

## 2024-12-09 RX ADMIN — LACTULOSE 20 G: 20 SOLUTION ORAL at 08:27

## 2024-12-09 RX ADMIN — GABAPENTIN 100 MG: 100 CAPSULE ORAL at 08:27

## 2024-12-09 RX ADMIN — ACETAMINOPHEN 500 MG: 500 TABLET ORAL at 20:22

## 2024-12-09 RX ADMIN — ALPRAZOLAM 1 MG: 0.5 TABLET ORAL at 20:25

## 2024-12-09 RX ADMIN — LACTULOSE 20 G: 20 SOLUTION ORAL at 13:36

## 2024-12-09 RX ADMIN — OXYCODONE HYDROCHLORIDE 5 MG: 5 TABLET ORAL at 04:50

## 2024-12-09 RX ADMIN — ALPRAZOLAM 0.5 MG: 0.5 TABLET ORAL at 08:27

## 2024-12-09 RX ADMIN — HEPARIN SODIUM 5000 UNITS: 5000 INJECTION INTRAVENOUS; SUBCUTANEOUS at 05:46

## 2024-12-09 RX ADMIN — TRAZODONE HYDROCHLORIDE 50 MG: 50 TABLET ORAL at 20:22

## 2024-12-09 RX ADMIN — LANSOPRAZOLE 30 MG: 30 TABLET, ORALLY DISINTEGRATING ORAL at 05:46

## 2024-12-09 RX ADMIN — HEPARIN SODIUM 5000 UNITS: 5000 INJECTION INTRAVENOUS; SUBCUTANEOUS at 13:37

## 2024-12-09 RX ADMIN — SUCRALFATE 1 G: 1 TABLET ORAL at 05:46

## 2024-12-09 RX ADMIN — RIFAXIMIN 550 MG: 550 TABLET ORAL at 08:27

## 2024-12-09 RX ADMIN — SODIUM CHLORIDE, PRESERVATIVE FREE 10 ML: 5 INJECTION INTRAVENOUS at 08:27

## 2024-12-09 RX ADMIN — OXYCODONE HYDROCHLORIDE 5 MG: 5 TABLET ORAL at 18:59

## 2024-12-09 RX ADMIN — ALPRAZOLAM 0.5 MG: 0.5 TABLET ORAL at 17:09

## 2024-12-09 RX ADMIN — SUCRALFATE 1 G: 1 TABLET ORAL at 13:37

## 2024-12-09 ASSESSMENT — PULMONARY FUNCTION TESTS
PIF_VALUE: 23
PIF_VALUE: 23
PIF_VALUE: 19
PIF_VALUE: 17
PIF_VALUE: 35
PIF_VALUE: 22
PIF_VALUE: 16
PIF_VALUE: 37
PIF_VALUE: 23
PIF_VALUE: 17
PIF_VALUE: 29
PIF_VALUE: 18
PIF_VALUE: 29
PIF_VALUE: 23
PIF_VALUE: 25

## 2024-12-09 ASSESSMENT — PAIN SCALES - GENERAL
PAINLEVEL_OUTOF10: 10
PAINLEVEL_OUTOF10: 9
PAINLEVEL_OUTOF10: 8
PAINLEVEL_OUTOF10: 7
PAINLEVEL_OUTOF10: 7
PAINLEVEL_OUTOF10: 6
PAINLEVEL_OUTOF10: 8
PAINLEVEL_OUTOF10: 10

## 2024-12-09 ASSESSMENT — PAIN DESCRIPTION - LOCATION
LOCATION: COCCYX;BACK

## 2024-12-09 ASSESSMENT — PAIN - FUNCTIONAL ASSESSMENT
PAIN_FUNCTIONAL_ASSESSMENT: PREVENTS OR INTERFERES WITH ALL ACTIVE AND SOME PASSIVE ACTIVITIES

## 2024-12-09 ASSESSMENT — PAIN DESCRIPTION - PAIN TYPE: TYPE: CHRONIC PAIN

## 2024-12-09 ASSESSMENT — PAIN DESCRIPTION - FREQUENCY: FREQUENCY: CONTINUOUS

## 2024-12-09 ASSESSMENT — PAIN DESCRIPTION - ONSET: ONSET: ON-GOING

## 2024-12-09 ASSESSMENT — PAIN DESCRIPTION - DESCRIPTORS: DESCRIPTORS: ACHING

## 2024-12-09 ASSESSMENT — PAIN DESCRIPTION - ORIENTATION: ORIENTATION: MID;LOWER

## 2024-12-09 NOTE — CARE COORDINATION
Discussed possible appropriateness for LTAC level of care prior to discharge back to [facility] with Dr. Perez. Left VM for [facility] requesting he review to know if pt would be appropriate for LTAC. Waiting on call back.     1413 - Per [facility]. [facility] can accept pending insurance/financials. Spoke with pt's wife, she is agreeable to go to [facility] and would prefer [facility] over [facility] to further attempt weaning off the vent/trach. Pending insurance/financials.

## 2024-12-09 NOTE — CARE COORDINATION
Spoke with [facility]. Pt approved for [facility]. Transportation arranged with [facility] for   RN is aware. Ana is aware. Pt's spouse is aware. Packet prepped and placed with soft chart.     RN: please call report [phone]  Fax [facility] and DC summary to [phone]    Pt is going to room 2008.

## 2024-12-09 NOTE — CARE COORDINATION
CM reviewed chart. Spoke with [facility] Admissions personnel at  desk. Pt has been at [facility] for several months. Wife states that she has considered moving him to a different facility but states that \"they are all the same\" and consents to return to [facility]. Plan remains the same. Discharge to [facility] when medically ready.

## 2024-12-10 NOTE — PROGRESS NOTES
[facility]             MD Vianca Pardo MD Carol Christensen, APRN-CNP       Su Josselyn, APRN-CNP             [address] [place] [address]             [phone] fax [phone]      Gastroenterology Progress note . 12/3/2024  Reason for consult:  patient with intraabdominal ascites, esrd on hemodialysis, ventilator dependent, large pleural effusions         Physician attestation:  I have personally seen and examined the patient independently. I have reviewed the patient's available data,including medical history and recent test results. Reviewed and discussed note as documented by ANA.  I agree with the physical exam findings, assessment and plan.     Briefly   Hemoglobin noted to be 7.5.  Denies any melena or hematochezia.  Patient becoming more alert and cooperative.  Continues to be on CRRT    Gen: normal mood, alert  ENT: normal TJ  Cardiovascular: RRR, No M/R/G  Respiratory: CTA BL  Gastrointestinal: Soft,NT ND  Genitourinary: no suprapubic tenderness  Musculoskeletal: no scars  Skin:moist  Neuro: alert and oriented x3    My assessment and plan reveals     1) decompensated cirrhosis  MELD 3.0 25  Status post paracentesis with 6 L removed.   IV albumin 25 gm daily for 3 days    CIRRHOSIS DATABASE:  A.  Varices surveillance/prophylaxis-last EGD with Dr. Khan at [facility] with some oozing AVMs in August 2024.  No mention of esophageal or GAVE varices  B.  Ascites-large amount of ascites noted, patient is ESRD on dialysis not a candidate for diuretics.  IR guided Thora and para ordered  C.  Hepatocellular carcinoma screening-ultrasound every 6 months  D.  Vaccination-check for hepatitis A and B vaccination and vaccinate if negative  E.  Hepatic encephalopathy-start on lactulose 20 g 3 times daily and can go down titrate to 2-3 bowel movements.  Rifaximin 550 mg twice daily  F.  Spontaneous 
             [facility]             MD Vianca Pardo MD Carol Christensen, APRN-CNP             [address] [place] [address]             [phone] fax [phone]      Gastroenterology Progress note . 12/2/2024  Reason for consult: patient with intraabdominal ascites, esrd on hemodialysis, ventilator dependent, large pleural effusions           Interval H/P  Patient underwent paracentesis today with removal of 6 L.  This was done by the ICU team.  Noted to be feeling better pain wise after paracentesis.  Continues on CRRT.     Physical Exam  Blood pressure 106/68, pulse 74, temperature 99 °F (37.2 °C), temperature source Rectal, resp. rate 24, height 1.753 m (5' 9\"), weight 103.1 kg (227 lb 4.7 oz), SpO2 100%.  Constitutional: Patient is in no distress.   Eyes: Pupils equal, round.  No icterus.  HENT: Oral mucosa is moist.  Tracheostomy noted  Pulmonary: No accessory muscle use. No localizing pulmonary findings.     Cardiovascular: Heart has a regular rate and rhythm, no JVD.   Gastrointestinal: PEG tube in place bowel sounds are present in all four quadrants. Abdomen is soft, nontender, nondistended. Rectal examination deferred.   Skin: No jaundice, spider angiomas, or palmar erythema. No purpura.  Neuro:No gross focal neurologic deficits.       Chart and labs reviewed.    1) decompensated cirrhosis  MELD 3.0 25  Status post paracentesis with 6 L removed.   IV albumin 25 gm daily for 3 days    CIRRHOSIS DATABASE:  A.  Varices surveillance/prophylaxis-last EGD with Dr. Khan at [facility] with some oozing AVMs in August 2024.  No mention of esophageal or GAVE varices  B.  Ascites-large amount of ascites noted, patient is ESRD on dialysis not a candidate for diuretics.  IR guided Thora and para ordered  C.  Hepatocellular carcinoma screening-ultrasound every 6 months  D.  Vaccination-check for hepatitis A and B 
             [facility]             MD Vianca Pardo MD Carol Christensen, APRN-CNP             [address] [place] [address]             [phone] fax [phone]      Gastroenterology Progress note . 12/1/2024  Reason for consult: patient with intraabdominal ascites, esrd on hemodialysis, ventilator dependent, large pleural effusions           Interval H/P  Patient on CRRT.  Nephrology following.  Electrolytes improved  Doing better overall.      Physical Exam  Blood pressure (!) 97/52, pulse 70, temperature 99.1 °F (37.3 °C), temperature source Rectal, resp. rate 14, height 1.753 m (5' 9\"), weight 107.2 kg (236 lb 5.3 oz), SpO2 100%.  Constitutional: Patient is in no distress.   Eyes: Pupils equal, round.  No icterus.  HENT: Oral mucosa is moist.  Tracheostomy noted  Pulmonary: No accessory muscle use. No localizing pulmonary findings.     Cardiovascular: Heart has a regular rate and rhythm, no JVD.   Gastrointestinal: PEG tube in place bowel sounds are present in all four quadrants. Abdomen is soft, nontender, nondistended. Rectal examination deferred.   Skin: No jaundice, spider angiomas, or palmar erythema. No purpura.  Neuro:No gross focal neurologic deficits.       Chart and labs reviewed.    1) decompensated cirrhosis  MELD 3.0 25  Patient with significant ascites as well as pleural effusion.  Agree with IR guided thoracentesis and paracentesis.  IV albumin 25 gm daily for 3 days    CIRRHOSIS DATABASE:  A.  Varices surveillance/prophylaxis-last EGD with Dr. Khan at [facility] with some oozing AVMs in August 2024.  No mention of esophageal or GAVE varices  B.  Ascites-large amount of ascites noted, patient is ESRD on dialysis not a candidate for diuretics.  IR guided Thora and para ordered  C.  Hepatocellular carcinoma screening-ultrasound every 6 months  D.  Vaccination-check for hepatitis A and B vaccination 
     Inpatient critical care progress note 12/2/2024        Tico Day  1960  [id number]      Assessment/Plan:    Chronic respiratory failure with hypoxemia, ventilator dependency  Tracheostomy status  Hypotension, pressor reliant  Decompensated cirrhosis  End-stage renal disease  Volume overload (end-stage renal disease, chronic systolic heart failure, cirrhosis as etiologies)  Chronic systolic heart failure (EF 30 to 35%)  Coronary artery disease prior PCI  Hyperlipidemia  Protein calorie malnutrition    Continue mechanical ventilatory support  Hemodynamic support low-dose pressor  Continuous renal replacement therapy  Medical management of decompensated cirrhosis as currently provided including rifaximin and lactulose, pursue diagnostic therapeutic paracentesis  Ulcer, DVT prophylaxis  Enteral nutritional support via PEG    Complex medical decisions required for today's evaluation and management, reviewed during critical care rounds      The patient suffers from critical illness including respiratory failure mandating mechanical ventilatory support, hypotension necessitating use of pressor agent, end-stage renal disease necessitating continuous renal replacement therapy and decompensated cirrhosis mandating medical management including administration of rifaximin and lactulose.  Without current level of aggressive supportive medical measures, further decline of clinical status and untoward mortality.  37 minutes of critical care time required for today's evaluation management, independent of any time required for the performance of procedures.    E Cordasco  [phone]        Subjective:    No acute overnight events reported    Low-level ventilatory support based on current FiO2 PEEP requirement    Continuous renal replacement therapy -300 cc/h output    Levophed infusion 6 mcg for hemodynamic support    Sinus rhythm per bedside monitor      Review of Systems     Unable to obtain review of system 
     Inpatient critical care progress note 12/3/2024        Tico Day  1960  [id number]      Assessment/Plan:    Chronic respiratory failure with hypoxemia, ventilator dependency  Tracheostomy status  Hypotension, pressor reliant  Decompensated cirrhosis  End-stage renal disease  Volume overload (end-stage renal disease, chronic systolic heart failure, cirrhosis as etiologies)  Chronic systolic heart failure (EF 35-40%)  Coronary artery disease prior PCI  Hyperlipidemia  Protein calorie malnutrition  Diabetes mellitus type 2  Anemia of chronic disease, phlebotomies    Continue mechanical ventilatory support  Hemodynamic support low-dose pressor  Continuous renal replacement therapy  Medical management of decompensated cirrhosis as currently provided including rifaximin and lactulose, paracentesis results does not suggest SBP  Ulcer, DVT prophylaxis  Enteral nutritional support via PEG  Glycemic control  Monitor serial hemoglobin values, given risk for bleeding    Complex medical decisions required for today's evaluation and management, reviewed during critical care rounds      The patient suffers from critical illness including respiratory failure mandating mechanical ventilatory support, hypotension necessitating use of pressor agent, end-stage renal disease necessitating continuous renal replacement therapy and decompensated cirrhosis mandating medical management including administration of rifaximin and lactulose.  Without current level of aggressive supportive medical measures, further decline of clinical status and untoward mortality.  36 minutes of critical care time required for today's evaluation management, independent of any time required for the performance of procedures.    E Cordasco  [phone]        Subjective:    No acute overnight events reported    Low-level ventilatory support based on current FiO2 PEEP requirement    Continuous renal replacement therapy -200 cc/h 
     Inpatient critical care progress note 12/4/2024        Tico Day  1960  [id number]      Assessment/Plan:    Chronic respiratory failure with hypoxemia, ventilator dependency  Tracheostomy status  Hypotension, pressor reliant  Decompensated cirrhosis  End-stage renal disease  Volume overload (end-stage renal disease, chronic systolic heart failure, cirrhosis as etiologies)  Chronic systolic heart failure (EF 35-40%)  Coronary artery disease prior PCI  Hyperlipidemia  Protein calorie malnutrition  Diabetes mellitus type 2  Anemia of chronic disease plus phlebotomies    Continue mechanical ventilatory support  Hemodynamic support low-dose pressor  Continuous renal replacement therapy  Medical management of decompensated cirrhosis as currently provided including rifaximin and lactulose, paracentesis results does not suggest SBP  Ulcer, DVT prophylaxis  Enteral nutritional support via PEG  Glycemic control    Complex medical decisions required for today's evaluation and management, reviewed during critical care rounds      The patient suffers from critical illness including respiratory failure mandating mechanical ventilatory support, hypotension necessitating use of pressor agent, end-stage renal disease necessitating continuous renal replacement therapy and decompensated cirrhosis mandating medical management including administration of rifaximin and lactulose.  Without current level of aggressive supportive medical measures, further decline of clinical status and untoward mortality.  34 minutes of critical care time required for today's evaluation management, independent of any time required for the performance of procedures.    E Cordasco  [phone]        Subjective:    No acute overnight events reported    Low-level ventilatory support based on current FiO2 PEEP requirement    Continuous renal replacement therapy -100 cc/h output, note clotting of artificial kidney (\"one per shift\") in spite of 
     Inpatient critical care progress note 12/5/2024        Tico Day  1960  [id number]      Assessment/Plan:    Chronic respiratory failure with hypoxemia, ventilator dependency  Tracheostomy status,?  Tracheitis (polymicrobial Proteus, Klebsiella, Pseudomonas, E. coli)  Hypotension, pressor reliant  Decompensated cirrhosis (?  WILDE)  End-stage renal disease  Volume overload (end-stage renal disease, chronic systolic heart failure, cirrhosis as etiologies)  Chronic systolic heart failure (EF 35-40%)  Coronary artery disease prior PCI  Hyperlipidemia  Protein calorie malnutrition  Diabetes mellitus type 2    Continue mechanical ventilatory support  Hemodynamic support low-dose pressor  Short course of antimicrobial therapy directed towards treatment of tracheitis  Continuous renal replacement therapy  Medical management of decompensated cirrhosis as currently provided including rifaximin and lactulose, paracentesis results does not suggest SBP  Ulcer, DVT prophylaxis  Enteral nutritional support via PEG  Glycemic control    Complex medical decisions required for today's evaluation and management, reviewed during critical care rounds    In spite of aggressive medical measures, I note little change in the patient's clinical status.      The patient suffers from critical illness including respiratory failure mandating mechanical ventilatory support, hypotension necessitating use of pressor agent, end-stage renal disease necessitating continuous renal replacement therapy and decompensated cirrhosis mandating medical management including administration of rifaximin and lactulose.  Without current level of aggressive supportive medical measures, further decline of clinical status and untoward mortality.  36 minutes of critical care time required for today's evaluation management, independent of any time required for the performance of procedures.    E Cordasco  [phone]        Subjective:    No acute 
     Inpatient critical care progress note 12/7/2024        Tico Day  1960  [id number]      Assessment/Plan:    Chronic respiratory failure with hypoxemia, ventilator dependency  Tracheostomy status,?  Tracheitis (polymicrobial Proteus, Klebsiella, Pseudomonas, E. coli)  Hypotension, pressor reliant  Decompensated cirrhosis  End-stage renal disease  Volume overload (end-stage renal disease, chronic systolic heart failure, cirrhosis as etiologies)  Chronic systolic heart failure (EF 35-40%)  Coronary artery disease prior PCI  Hyperlipidemia  Protein calorie malnutrition  Anemia of chronic disease, phlebotomies  Diabetes mellitus type 2    Continue mechanical ventilatory support  Hemodynamic support low-dose pressor  Short course of antimicrobial therapy directed towards treatment of tracheitis  Continuous renal replacement therapy  Medical management of decompensated cirrhosis as currently provided including rifaximin and lactulose, paracentesis results does not suggest SBP  Ulcer, DVT prophylaxis  Enteral nutritional support via PEG  Transfusion threshold approximately 7 gm/dl (especially since pressor requiring)  Glycemic control    Complex medical decisions required for today's evaluation and management, reviewed during critical care rounds    In spite of aggressive medical measures, I note little change in the patient's clinical status. I placed call to spouse to discuss goals of care, no response.      The patient suffers from critical illness including respiratory failure mandating mechanical ventilatory support, hypotension necessitating use of pressor agent, end-stage renal disease necessitating continuous renal replacement therapy and decompensated cirrhosis mandating medical management including administration of rifaximin and lactulose.  Without current level of aggressive supportive medical measures, further decline of clinical status and untoward mortality.  34 minutes of critical care time 
     Progress Note      Name:  Tico Day /Age/Sex: 1960  (64 y.o. male)   MRN & CSN:  [id number] & [id number] Encounter Date/Time: 2024 9:36 PM   Location:  -A PCP: Carlos Nina MD       Hospital Day: 6    Assessment and Plan:     Patient is a 64-year-old male who presented with abdominal distention.     Pt transferred to ICU on  AM for pressors and CRRT.    # Decompensated cirrhosis with ascites and bilateral pleural effusions  # Hyponatremia  - Endorsed worsening abdominal distention and orthopnea over the past few weeks. Unclear etiology of cirrhosis.    - CT showing cirrhotic liver with massive ascites and bilateral large pleural effusions.  - ED provider discussed with GI, follow-up.  - S/p paracentesis, results do not suggest SBP. Started rifaximin and lactulose, goal 2-3 soft BMs/day.    -Nephrology managing CRRT     # ESRD on HD MWF  - Oliguric, last treatment on .    - Nephrology managing CRRT    # HFrEF, compensated  - Last TTE in 2024 with LVEF of 55-60%.     # Chronic hypoxemic respiratory failure  # Ventilator dependent    # CAD s/p PCI in       Checklist:  Advanced care planning: full  Diet: NPO, TF   DVT ppx: heparin        Estimated discharge: 3-4 day(s).  Current living situation: LTC.  Expected disposition: LTC.    Spoke with ED provider who recommended admission for the patient and I agree with that plan.  Personally reviewed lab studies and imaging.  EKG interpreted personally and results as stated above.  Imaging that was interpreted personally and results as stated above.    History of Present Illness:     Chief Complaint: Abdominal swelling    Pt on CRRT.    ROS:     Pertinent positives and negatives discussed in HPI above.    Objective:       Intake/Output Summary (Last 24 hours) at 20246  Last data filed at 2024 2100  Gross per 24 hour   Intake 1416.57 ml   Output 3208 ml   Net -1791.43 ml        Vitals:   Vitals:    
     Progress Note      Name:  Tico Day /Age/Sex: 1960  (64 y.o. male)   MRN & CSN:  [id number] & [id number] Encounter Date/Time: 2024 2:14 AM   Location:  -A PCP: Carlos Nina MD       Hospital Day: 11    Assessment and Plan:     Patient is a 64-year-old male who presented with abdominal distention.     Pt transferred to ICU on  AM for pressors and CRRT.    # Decompensated cirrhosis with ascites and bilateral pleural effusions  # Hyponatremia  - Endorsed worsening abdominal distention and orthopnea over the past few weeks. Unclear etiology of cirrhosis.    - CT showing cirrhotic liver with massive ascites and bilateral large pleural effusions.  - ED provider discussed with GI, follow-up.  - S/p paracentesis, results do not suggest SBP. Started rifaximin and lactulose, goal 2-3 soft BMs/day.    -Nephrology managing CRRT     # Tracheitis  -Abx initiated    # ESRD on HD MWF  - Oliguric, last treatment on .    - Nephrology managing CRRT    # HFrEF, compensated  - Last TTE in 2024 with LVEF of 55-60%.     # Chronic hypoxemic respiratory failure  # Ventilator dependent    # CAD s/p PCI in           Discussed care with intensivist who accepted pt to CCU service  Personally reviewed lab studies and imaging.  EKG interpreted personally and results as stated above.  Imaging that was interpreted personally and results as stated above.    History of Present Illness:     Chief Complaint: Abdominal swelling    Remains on pressors    ROS:     Pertinent positives and negatives discussed in HPI above.    Objective:       Intake/Output Summary (Last 24 hours) at 2024 0214  Last data filed at 2024 2249  Gross per 24 hour   Intake 1635.4 ml   Output 170 ml   Net 1465.4 ml        Vitals:   Vitals:    24 2052 24 2100 24 2130 24 2326   BP:  106/62 (!) 93/57    Pulse: 67 68 64 68   Resp: 18 18 18 18   Temp:       TempSrc:       SpO2:    98%   Weight:  
     Progress Note      Name:  Tico Day /Age/Sex: 1960  (64 y.o. male)   MRN & CSN:  [id number] & [id number] Encounter Date/Time: 2024 8:33 PM   Location:  -A PCP: Carlos Nina MD       Hospital Day: 7    Assessment and Plan:     Patient is a 64-year-old male who presented with abdominal distention.     Pt transferred to ICU on  AM for pressors and CRRT.    # Decompensated cirrhosis with ascites and bilateral pleural effusions  # Hyponatremia  - Endorsed worsening abdominal distention and orthopnea over the past few weeks. Unclear etiology of cirrhosis.    - CT showing cirrhotic liver with massive ascites and bilateral large pleural effusions.  - ED provider discussed with GI, follow-up.  - S/p paracentesis, results do not suggest SBP. Started rifaximin and lactulose, goal 2-3 soft BMs/day.    -Nephrology managing CRRT     # Tracheitis  -Abx initiated    # ESRD on HD MWF  - Oliguric, last treatment on .    - Nephrology managing CRRT    # HFrEF, compensated  - Last TTE in 2024 with LVEF of 55-60%.     # Chronic hypoxemic respiratory failure  # Ventilator dependent    # CAD s/p PCI in           Discussed care with intensivist who accepted pt to CCU service  Personally reviewed lab studies and imaging.  EKG interpreted personally and results as stated above.  Imaging that was interpreted personally and results as stated above.    History of Present Illness:     Chief Complaint: Abdominal swelling    Pt on CRRT.    ROS:     Pertinent positives and negatives discussed in HPI above.    Objective:       Intake/Output Summary (Last 24 hours) at 2024  Last data filed at 2024 1839  Gross per 24 hour   Intake 1250.63 ml   Output 2827 ml   Net -1576.37 ml        Vitals:   Vitals:    24 1830 24 1840 24 1845 24 1931   BP: (!) 80/52 (!) 93/56 (!) 93/56    Pulse: 84 86 79 82   Resp: 18 13 18 18   Temp:       TempSrc:       SpO2: 97% 97% 
     Progress Note      Name:  Tico Day /Age/Sex: 1960  (64 y.o. male)   MRN & CSN:  [id number] & [id number] Encounter Date/Time: 2024 2:26 PM   Location:  -A PCP: Carlos Nina MD       Hospital Day: 2    Assessment and Plan:     Patient is a 64-year-old male who presented with abdominal distention.     Pt transferred to ICU on  AM for pressors and CRRT.    # Decompensated cirrhosis with ascites and bilateral pleural effusions  # Hyponatremia  - Endorsed worsening abdominal distention and orthopnea over the past few weeks. Unclear etiology of cirrhosis.    - CT showing cirrhotic liver with massive ascites and bilateral large pleural effusions.  - ED provider discussed with GI, follow-up.  - IR consulted for diagnostic and therapeutic paracentesis and thoracentesis. MELD-Na labs ordered. Start rifaximin and lactulose, goal 2-3 soft BMs/day.       # ESRD on HD MWF  - Oliguric, last treatment on .    - Nephrology consulted, appreciate assistance.     # HFrEF, compensated  - Last TTE in 2024 with LVEF of 55-60%.     # Chronic hypoxemic respiratory failure  # Ventilator dependent    # CAD s/p PCI in       Checklist:  Advanced care planning: full  Diet: NPO, TF   DVT ppx: heparin        Estimated discharge: 3-4 day(s).  Current living situation: LTC.  Expected disposition: LTC.    Spoke with ED provider who recommended admission for the patient and I agree with that plan.  Personally reviewed lab studies and imaging.  EKG interpreted personally and results as stated above.  Imaging that was interpreted personally and results as stated above.    History of Present Illness:     Chief Complaint: Abdominal swelling    Pt lethargic this morning with SBP 70s prior to midodrine administration. Discussed hypotension and likely inability to tolerate HD with nephrology, plan for CRRT. Discussed with intensivist who accepted pt to their service for CRRT and pressors.      ROS: 
     Progress Note      Name:  Tico Day /Age/Sex: 1960  (64 y.o. male)   MRN & CSN:  [id number] & [id number] Encounter Date/Time: 2024 10:48 AM   Location:  -A PCP: Carlos Nina MD       Hospital Day: 3    Assessment and Plan:     Patient is a 64-year-old male who presented with abdominal distention.     Pt transferred to ICU on  AM for pressors and CRRT.    # Decompensated cirrhosis with ascites and bilateral pleural effusions  # Hyponatremia  - Endorsed worsening abdominal distention and orthopnea over the past few weeks. Unclear etiology of cirrhosis.    - CT showing cirrhotic liver with massive ascites and bilateral large pleural effusions.  - ED provider discussed with GI, follow-up.  - IR consulted for diagnostic and therapeutic paracentesis and thoracentesis. MELD-Na labs ordered. Start rifaximin and lactulose, goal 2-3 soft BMs/day.    -Nephrology managing CRRT     # ESRD on HD MWF  - Oliguric, last treatment on .    - Nephrology managing CRRT    # HFrEF, compensated  - Last TTE in 2024 with LVEF of 55-60%.     # Chronic hypoxemic respiratory failure  # Ventilator dependent    # CAD s/p PCI in       Checklist:  Advanced care planning: full  Diet: NPO, TF   DVT ppx: heparin        Estimated discharge: 3-4 day(s).  Current living situation: LTC.  Expected disposition: LTC.    Spoke with ED provider who recommended admission for the patient and I agree with that plan.  Personally reviewed lab studies and imaging.  EKG interpreted personally and results as stated above.  Imaging that was interpreted personally and results as stated above.    History of Present Illness:     Chief Complaint: Abdominal swelling    Pt on CRRT.    ROS:     Pertinent positives and negatives discussed in HPI above.    Objective:       Intake/Output Summary (Last 24 hours) at 2024 1048  Last data filed at 2024 1000  Gross per 24 hour   Intake 2035.66 ml   Output 4719 ml 
   12/04/24 2008   Patient Observation   Pulse 97   Respirations 22   SpO2 98 %   Vent Information   Ventilator -25   Vent Mode CPAP/PS   Ventilator Settings   FiO2  30 %   PEEP/CPAP (cmH2O) 5   Pressure Support (cm H2O) 10 cm H2O   Vent Patient Data (Readings)   Vt (Measured) 435 mL   Peak Inspiratory Pressure (cmH2O) 16 cmH2O   Rate Measured 28 br/min   Minute Volume (L/min) 12.2 Liters   Mean Airway Pressure (cmH2O) 8.8 cmH20   Plateau Pressure (cm H2O) 0 cm H2O   Driving Pressure -5   I:E Ratio 1:2.00   Flow Sensitivity 3 L/min   Backup Apnea On   Backup Rate 16 Breaths Per Minute   Backup Vt 450   Vent Alarm Settings   High Pressure (cmH2O) 40 cmH2O   Low Minute Volume (lpm) 2.5 L/min   High Minute Volume (lpm) 20 L/min   Low Exhaled Vt (ml) 250 mL   High Exhaled Vt (ml) 800 mL   RR High (bpm) 40 br/min   Apnea (secs) 20 secs   Additional Respiratoray Assessments   Humidification Source HME   Circuit Condensation Drained   Ambu Bag With Mask At Bedside Yes   Airway Clearance   Suction Trach   Subglottic Suction Done Yes   Suction Device Inline suction catheter   Sputum Amount Large   Sputum Color/Odor White   $Obtained Sample $Induced Sputum (charge not used for Bronchoscopy)   Surgical Airway  11/30/24 Timbo Cuffed   Placement Date/Time: 11/30/24 2000   Present on Admission/Arrival: Yes  Placed By: In surgery  Surgical Airway Type: Tracheostomy  Brand: Timbo  Style: Cuffed  Comments: inner cannula is a 6 shiley   Status Secured   Site Assessment Dry   Spare Trach at Bedside Yes   Spare Trach Tube One Size Smaller at Bedside Yes   Ambu Bag With Mask at Bedside Yes       
   12/05/24 1931   Patient Observation   Pulse 82   Respirations 18   SpO2 100 %   Vent Information   Vent Mode AC/PRVC   Ventilator Settings   FiO2  30 %   PEEP/CPAP (cmH2O) 5   Resp Rate (Set) 18 bpm   Vt (Set, mL) 500 mL   Vent Patient Data (Readings)   Vt (Measured) 513 mL   Peak Inspiratory Pressure (cmH2O) 26 cmH2O   Rate Measured 18 br/min   Minute Volume (L/min) 9.17 Liters   Mean Airway Pressure (cmH2O) 12 cmH20   Plateau Pressure (cm H2O) 0 cm H2O   Driving Pressure -5   I:E Ratio 1:2.00   I Time/ I Time % 1 s   Vent Alarm Settings   High Pressure (cmH2O) 40 cmH2O   Low Minute Volume (lpm) 2.5 L/min   High Minute Volume (lpm) 20 L/min   High Exhaled Vt (ml) 800 mL   RR Low (bpm) 250   RR High (bpm) 40 br/min   Apnea (secs) 20 secs   Additional Respiratoray Assessments   Humidification Source HME   Circuit Condensation Drained   Ambu Bag With Mask At Bedside Yes   Airway Clearance   Suction Trach   Subglottic Suction Done No   Suction Device Inline suction catheter   Sputum Method Obtained Tracheal   Sputum Amount Moderate   $Obtained Sample $Induced Sputum (charge not used for Bronchoscopy)   Treatment   $Treatment Type $Inhaled Therapy/Meds       
   12/07/24 0838   Patient Observation   Pulse 80   Respirations 28   SpO2 97 %   Vent Information   Equipment Changed HME   Vent Mode CPAP/PS   $Ventilation $Subsequent Day   Ventilator Settings   FiO2  30 %   PEEP/CPAP (cmH2O) 5   Vent Patient Data (Readings)   Vt (Measured) 387 mL   Peak Inspiratory Pressure (cmH2O) 21 cmH2O   Rate Measured 29 br/min   Minute Volume (L/min) 11.6 Liters   Mean Airway Pressure (cmH2O) 9.5 cmH20   Plateau Pressure (cm H2O) 0 cm H2O   Driving Pressure -5   I:E Ratio 1:2.60   Backup Apnea On   Backup Rate 16 Breaths Per Minute   Backup Vt 450   Vent Alarm Settings   High Pressure (cmH2O) 40 cmH2O   Low Minute Volume (lpm) 2.5 L/min   High Minute Volume (lpm) 20 L/min   Low Exhaled Vt (ml) 0.39 mL   High Exhaled Vt (ml) 800 mL   RR Low (bpm) 250   RR High (bpm) 40 br/min   Apnea (secs) 20 secs   Additional Respiratoray Assessments   Humidification Source HME   Circuit Condensation Not drained   Ambu Bag With Mask At Bedside Yes   Backup Trachs Available (Size) 4.0;6.0       
   12/08/24 1059   Patient Observation   Pulse 73   SpO2 96 %   Vent Information   Vent Mode AC/PRVC   Ventilator Settings   FiO2  30 %   PEEP/CPAP (cmH2O) 5   Insp Time (sec) 1.1 sec   Resp Rate (Set) 18 bpm   Vt (Set, mL) 500 mL   Vent Patient Data (Readings)   Vt (Measured) 411 mL   Peak Inspiratory Pressure (cmH2O) 24 cmH2O   Rate Measured 21 br/min   Minute Volume (L/min) 8.11 Liters   Mean Airway Pressure (cmH2O) 12 cmH20   Plateau Pressure (cm H2O) 0 cm H2O   Driving Pressure -5   I:E Ratio 1:1.60   Flow Sensitivity 3 L/min   I Time/ I Time % 1 s   Backup Apnea On   Backup Rate 16 Breaths Per Minute   Backup Vt 450   Vent Alarm Settings   High Pressure (cmH2O) 40 cmH2O   Low Minute Volume (lpm) 2.5 L/min   High Minute Volume (lpm) 20 L/min   Low Exhaled Vt (ml) 250 mL   High Exhaled Vt (ml) 1000 mL   RR Low (bpm) 250   RR High (bpm) 40 br/min   Apnea (secs) 20 secs   Additional Respiratoray Assessments   Humidification Source HME   Circuit Condensation Drained   Ambu Bag With Mask At Bedside Yes   Backup Trachs Available (Size) 4.0   Airway Clearance   Suction Trach   Suction Device Inline suction catheter   Sputum Method Obtained Tracheal   Sputum Amount Small   Sputum Color/Odor Brown   Sputum Consistency Thick   $Obtained Sample $Induced Sputum (charge not used for Bronchoscopy)       
   12/08/24 1156   Patient Observation   Pulse 67   SpO2 97 %   Vent Information   Vent Mode AC/PRVC   Ventilator Settings   FiO2  30 %   PEEP/CPAP (cmH2O) 5   Insp Time (sec) 1.1 sec   Resp Rate (Set) 18 bpm   Vt (Set, mL) 500 mL   Vent Patient Data (Readings)   Vt (Measured) 327 mL   Peak Inspiratory Pressure (cmH2O) 18 cmH2O   Rate Measured 22 br/min   Minute Volume (L/min) 7.01 Liters   Mean Airway Pressure (cmH2O) 10 cmH20   Plateau Pressure (cm H2O) 0 cm H2O   Driving Pressure -5   I:E Ratio 1:1.50   Flow Sensitivity 3 L/min   I Time/ I Time % 1 s   Backup Apnea On   Backup Rate 16 Breaths Per Minute   Backup Vt 450   Vent Alarm Settings   High Pressure (cmH2O) 40 cmH2O   Low Minute Volume (lpm) 2.5 L/min   High Minute Volume (lpm) 20 L/min   Low Exhaled Vt (ml) 250 mL   High Exhaled Vt (ml) 1000 mL   RR Low (bpm) 250   RR High (bpm) 40 br/min   Apnea (secs) 20 secs   Additional Respiratoray Assessments   Humidification Source HME   Circuit Condensation Drained   Ambu Bag With Mask At Bedside Yes   Backup Trachs Available (Size) 4.0   Airway Clearance   Suction Trach   Subglottic Suction Done Yes   Suction Device Inline suction catheter   Sputum Method Obtained Tracheal   Sputum Amount Moderate   Sputum Color/Odor Brown   Sputum Consistency Thick   $Obtained Sample $Induced Sputum (charge not used for Bronchoscopy)       
  [address] [place] [address], [address] [place] [address]  Phone: [phone]  Office Hours: 8:30AM - 4:30PM  Monday - Friday      Nephrology  Dialysis Note        PROCEDURE:  Patient seen during hemodialysis      PHYSICIAN:  PK      INDICATION:  End-stage renal disease, Hyponatremia, Metabolic acidosis (Non-anion gap)      RX:  See dialysis flowsheet for specifics on access, blood flow rate, dialysate baths, duration of dialysis, anticoagulation and other technical information.      COMMENTS:  unstable- BP 90s- needs blood  Albumin as needed              Poncho Hill MD FACP [facility]  
 Nephrology  Dialysis Note        [address] [facility], [address] [place], [place] [address]  Phone: [phone]  Office Hours: 8:30AM - 4:30PM  Monday - Friday          PROCEDURE:  Patient seen during CRRT      PHYSICIAN:  PAXTON      INDICATION:  End-stage renal disease/HYPERVOLEMIA/HYPOTENSION      RX:  See dialysis flowsheet for specifics on access, blood flow rate, dialysate baths, duration of dialysis, anticoagulation and other technical information.      COMMENTS:  CONTINUE CRRT WITH NET NEGATIVE 100ML/HR NEGATIVE BALANCE AS BP ALLOWS//REMAINS EDEMATOUS TODAY  ELECTROLYTE REPLACEMENTS PER SLIDING SCALE ORDERS PLEASE    Electronically signed by Mike Hoskins DO on 12/1/2024 at 8:30 AM    [facility]  MD KAYLEE ARRINGTON DO  [address] [facility],  [address] [place], [place] [address]  PHONE: [phone]  FAX: [phone]   
 Nephrology  Dialysis Note        [address] [facility], [address]  [place] [address]  Phone: [phone]  Office Hours: 8:30AM - 4:30PM  Monday - Friday          PROCEDURE:  Patient seen during CVVHDF      PHYSICIAN:  PAXTON      INDICATION:  End-stage renal disease      RX:  See dialysis flowsheet for specifics on access, blood flow rate, dialysate baths, duration of dialysis, anticoagulation and other technical information.      COMMENTS:   - CONTINUE CRRT UNTIL CURRENT FILTER STOPS OR CLOTS AND AT THAT POINT, WILL STOP CRRT AND REASSESS  -CONTINUE UF AS BP ALLOWS      -Hypervolemia  -Ascites and cirrhosis;S/P 6L LVP  -BLE edema  -Chronic hypotension  -Large B/L pleural effusions  -ESRD on HD MWF  -Anemia of ESRD: retacrit  -Hypercalcemia of immobilization  -Chronic hypoxic resp failure; on chronic vent      Electronically signed by Mike Hoskins DO on 12/5/2024 at 7:02 AM    [facility]  MD KAYLEE ARRINGTON DO  [address] [facility],  [address] [place] [address]  PHONE: [phone]  FAX: [phone]   
 Nephrology  Dialysis Note        [address] [facility], [address] [place] [address]  Phone: [phone]  Office Hours: 8:30AM - 4:30PM  Monday - Friday          PROCEDURE:  Patient seen during CVVHDF      PHYSICIAN:  PAXTON      INDICATION:  End-stage renal disease      RX:  See dialysis flowsheet for specifics on access, blood flow rate, dialysate baths, duration of dialysis, anticoagulation and other technical information.      COMMENTS:  CONTINUE CRRT, PEDAL EDEMA SEEMS BETTER    -Hypervolemia  -Ascites and cirrhosis;S/P 6L LVP  -BLE edema  -Chronic hypotension  -Large B/L pleural effusions  -ESRD on HD MWF  -Anemia of ESRD  -Hypercalcemia of immobilization  -Chronic hypoxic resp failure; on chronic vent           Electronically signed by Mike Hoskins DO on 12/3/2024 at 8:20 AM    [facility]  MD KAYLEE ARRINGTON DO  [address] [facility],  [address] [place] [address]  PHONE: [phone]  FAX: [phone]   
 Nephrology  Dialysis Note        [address] [facility], [address] [place] [address]  Phone: [phone]  Office Hours: 8:30AM - 4:30PM  Monday - Friday          PROCEDURE:  Patient seen during CVVHDF      PHYSICIAN:  PAXTON      INDICATION:  End-stage renal disease/HYPERVOLEMIA, HYPOTENSION      RX:  See dialysis flowsheet for specifics on access, blood flow rate, dialysate baths, duration of dialysis, anticoagulation and other technical information.      COMMENTS:  CONTINUE CRRT WITH UF AS BP ALLOWS  RETACRIT TO BE GIVEN SUBQ ON TTS FOR NOW    Electronically signed by Mike Hoskins DO on 12/4/2024 at 6:57 AM    [facility]  MD KAYLEE ARRINGTON DO  [address] [facility],  [address] [place] [address]  PHONE: [phone]  FAX: [phone]   
 Nephrology  Dialysis Note        [address] [facility], [address] [place] [address]  Phone: [phone]  Office Hours: 8:30AM - 4:30PM  Monday - Friday          PROCEDURE:  Patient seen during CVVHDF      PHYSICIAN:  PAXTON      INDICATION:  End-stage renal disease/HYPERVOLEMIA      RX:  See dialysis flowsheet for specifics on access, blood flow rate, dialysate baths, duration of dialysis, anticoagulation and other technical information.      COMMENTS:  CONTINUE CRRT AND UF AS BP ALLOWS  REPLETE ELECTROLYTES PER SLIDING SCALE    Electronically signed by Mike Hoskins DO on 12/2/2024 at 7:17 AM    [facility]  MD KAYLEE ARRINGTON DO  [address] [facility],  [address] [place] [address]  PHONE: [phone]  FAX: [phone]   
1.5 CC of air added to cuff to achieve MLT.  
4 Eyes Skin Assessment     NAME:  Tico Day  YOB: 1960  MEDICAL RECORD NUMBER:  [id number]    The patient is being assessed for  Admission    I agree that at least one RN has performed a thorough Head to Toe Skin Assessment on the patient. ALL assessment sites listed below have been assessed.      Areas assessed by both nurses:    Head, Face, Ears, Shoulders, Back, Chest, Arms, Elbows, Hands, Sacrum. Buttock, Coccyx, Ischium, Legs. Feet and Heels, and Under Medical Devices         Does the Patient have a Wound? Yes wound(s) were present on assessment. LDA wound assessment was Initiated and completed by RN       Wilfrid Prevention initiated by RN: Yes  Wound Care Orders initiated by RN: Yes    Pressure Injury (Stage 3,4, Unstageable, DTI, NWPT, and Complex wounds) if present, place Wound referral order by RN under : Yes    New Ostomies, if present place, Ostomy referral order under : No     Nurse 1 eSignature: Electronically signed by Urszula Day RN on 11/30/24 at 10:19 AM EST    **SHARE this note so that the co-signing nurse can place an eSignature**    Nurse 2 eSignature: Electronically signed by Keven Edmond RN on 12/1/24 at 1:41 AM EST     
Bedside Paracentesis by Dr. Crawford and SILVANA ramírez at this time. Pt tolerated procedure well. No fluid culture orders at this time. 1900cc of fluid removed.   
Clinically unchanged  On vent  On pressors  Labs reviewed  Calcium 11-12.6- management per primary team  Na and K stable  Next dialysis Monday  Care plans per primary team  Pregnosis poor  Dr Hoskins will follow      
Comprehensive Nutrition Assessment    Type and Reason for Visit:  Initial (chronic respiratory failure/vent dependent)    Nutrition Recommendations/Plan:   Resume EN when appropriate, renal formula with goal 40 ml/hr   Will continue to follow up during stay      Malnutrition Assessment:  Malnutrition Status:  Insufficient data (11/30/24 1206)    Context:  Chronic Illness       Nutrition Assessment:    Admit with ascites, liver cirrhosis with pleural effusions. Hx respiratory failure, vent and PEG. Also with hx ESRD on dialysis. NPO at this time. Recent admit patient receiving renal tube feeding at 40 ml/hr to provide 1728 calories, 77 g protein to provide more than 75% of estimated calorie and protein needs. Will follow at high nutrition risk.    Nutrition Related Findings:    not avialable on visit-procedure in room   from NH Wound Type: Pressure Injury, Stage II, Skin Tears       Current Nutrition Intake & Therapies:    Average Meal Intake: NPO  Average Supplements Intake: NPO      Anthropometric Measures:  Height: 175.3 cm (5' 9\")  Ideal Body Weight (IBW): 160 lbs (73 kg)    Admission Body Weight: 102.1 kg (225 lb 1.4 oz) (stated)  Current Body Weight: 102.1 kg (225 lb 1.4 oz), 140.7 % IBW. Weight Source: Stated  Current BMI (kg/m2): 33.2  Usual Body Weight: 96.2 kg (212 lb) (Dec 2023, Nov 2024)     % Weight Change (Calculated): 6.2  Weight Adjustment For: No Adjustment                 BMI Categories: Overweight (BMI 25.0-29.9)    Estimated Daily Nutrient Needs:  Energy Requirements Based On: Kcal/kg  Weight Used for Energy Requirements: Ideal  Energy (kcal/day): 2135-7213 (30-35 nickie/kg)  Weight Used for Protein Requirements: Ideal  Protein (g/day): 73-88 (1-1.2 g/kg)  Method Used for Fluid Requirements: Defer to provider  Fluid (ml/day):      Nutrition Diagnosis:   Inadequate oral intake related to impaired respiratory function as evidenced by NPO or clear liquid status due to medical condition, nutrition 
Comprehensive Nutrition Assessment    Type and Reason for Visit:  Reassess    Nutrition Recommendations/Plan:   Nepro running at goal rate 40 ml/hr with 300 ml water flush Q3H provides: 1728 kcal, 78 grams of protein and 1500 ml free water which meets 78% estimated kcal needs and 71% estimated protein needs.   To better meet needs, recommend increase nepro to 50 ml/hr, continue current water flushes of 100 ml Q3H.  Nepro (Renal Formula) with goal rate 50 mL/hr provides 2160 kcal, 97 grams of protein and 1672 mL free water  Please consult dietitian to order and manage EN      Malnutrition Assessment:  Malnutrition Status:  Insufficient data (11/30/24 1206)    Context:  Chronic Illness       Nutrition Assessment:    Pt remains on vent support, EN running at goal rate (nepro at 40 ml/hr). Please consult to order and manage EN as appropriate.    Nutrition Related Findings:    HD: s/p HD today with 1 unit PRBC, 1.4 L removed. Hgb 7.8 after transfusion. Na 130. +levo drip Wound Type: Pressure Injury, Stage II, Skin Tears       Current Nutrition Intake & Therapies:    Average Meal Intake: NPO  Average Supplements Intake: NPO  Diet NPO Exceptions are: Ice Chips  ADULT TUBE FEEDING; PEG; Renal Formula; Continuous; 40; No; 100; Q 3 hours  Current Tube Feeding (TF) Orders:  Feeding Route: PEG  Formula: Renal Formula  Schedule: Continuous  Feeding Regimen: 40  Additives/Modulars: None  Water Flushes: 100 Q3H  Current TF Provides: 1728kcal, 78g PRO, 1500ml    Anthropometric Measures:  Height: 175.3 cm (5' 9\")  Ideal Body Weight (IBW): 160 lbs (73 kg)    Admission Body Weight: 102.1 kg (225 lb 1.4 oz) (stated)  Current Body Weight: 87.5 kg (192 lb 14.4 oz), 140.7 % IBW. Weight Source: Bed scale  Current BMI (kg/m2): 28.5  Usual Body Weight: 96.2 kg (212 lb) (Dec 2023, Nov 2024)     % Weight Change (Calculated): 6.2  Weight Adjustment For: No Adjustment                 BMI Categories: Overweight (BMI 25.0-29.9)    Estimated Daily 
Comprehensive Nutrition Assessment    Type and Reason for Visit:  Reassess    Nutrition Recommendations/Plan:   While on CRRT, recommend modifying TF to better meet estimated needs. Vital AF (peptide based) @ 75ml/hr, to provide 2160kcal, 135g PRO, and 1640ml fluid w/ standard flushes   Monitor weights, GI status, renal fx, glucose, lytes, POC     Malnutrition Assessment:  Malnutrition Status:  Insufficient data (11/30/24 1206)    Context:  Chronic Illness       Nutrition Assessment:    Pt remains on vent support, TF running at goal rate, no noted issues w/ intolerance. Updated wt consistent w/ stated wt, still some wt gain, may be r/t fluid shifts. WIll continue to follow pt at high nutrition risk.    Nutrition Related Findings:    +levo, CRRT. lactulose, carafate; Wound Type: Pressure Injury, Stage II, Skin Tears       Current Nutrition Intake & Therapies:    Average Meal Intake: NPO  Average Supplements Intake: NPO  Diet NPO Exceptions are: Ice Chips  ADULT TUBE FEEDING; PEG; Renal Formula; Continuous; 40; No; 100; Q 3 hours  Current Tube Feeding (TF) Orders:  Feeding Route: PEG  Formula: Renal Formula  Schedule: Continuous  Feeding Regimen: 40  Additives/Modulars: None  Water Flushes: 100 Q3H  Current TF Provides: 1728kcal, 78g PRO, 1500ml    Anthropometric Measures:  Height: 175.3 cm (5' 9\")  Ideal Body Weight (IBW): 160 lbs (73 kg)    Admission Body Weight: 102.1 kg (225 lb 1.4 oz) (stated)  Current Body Weight: 103.1 kg (227 lb 4.7 oz), 140.7 % IBW. Weight Source: Bed scale  Current BMI (kg/m2): 33.6  Usual Body Weight: 96.2 kg (212 lb) (Dec 2023, Nov 2024)     % Weight Change (Calculated): 6.2  Weight Adjustment For: No Adjustment                 BMI Categories: Overweight (BMI 25.0-29.9)    Estimated Daily Nutrient Needs:  Energy Requirements Based On: Kcal/kg  Weight Used for Energy Requirements: Ideal  Energy (kcal/day): 5649-9426 (30-35 nickie/kg)  Weight Used for Protein Requirements: Ideal  Protein 
Conversation with spouse regarding goals of care. Sh wishes her  to continue aggressive care in spite of my opinion that he is not benefiting from these aggressive medical measures (dialysis, mechanical ventilation)  and will not regain independence in spite of these interventions.    E Cordasco  [phone]  
Green top sent to lab.    
IR consult noted for thoracentesis and paracentesis. Please hold anticoagulants. Pt. Would benefit from bedside para/thora done in ICU by CCM, due to cont. CRRT therapy, Levophed gtt, etc. Spoke with Nitza SEE.  
Nephrology Progress Note        [address] [facility], [address]  [place] [address]  Phone: [phone]  Office Hours: 8:30AM - 4:30PM  Monday - Friday 12/9/2024 8:37 AM  Subjective:   Admit Date: 11/29/2024  PCP: Carlos Nina MD  Interval History:   On MV  BP is OK    Diet: Diet NPO Exceptions are: Ice Chips  ADULT TUBE FEEDING; PEG; Renal Formula; Continuous; 40; No; 100; Q 3 hours      Data:   Scheduled Meds:   cefepime  1,000 mg IntraVENous Q24H    midodrine  15 mg PEG Tube q8h    epoetin gabriel-epbx  10,000 Units SubCUTAneous Once per day on Tuesday Thursday Saturday    sodium chloride flush  5-40 mL IntraVENous 2 times per day    miconazole   Topical BID    atorvastatin  40 mg PEG Tube Nightly    gabapentin  100 mg PEG Tube Daily    lansoprazole  30 mg PEG Tube Daily    sucralfate  1 g PEG Tube 3 times per day    traZODone  50 mg PEG Tube Nightly    sodium chloride flush  5-40 mL IntraVENous 2 times per day    lactulose  20 g PEG Tube TID    rifAXIMin  550 mg PEG Tube BID    heparin (porcine)  5,000 Units SubCUTAneous 3 times per day     Continuous Infusions:   sodium chloride      norepinephrine 1 mcg/min (12/08/24 1916)    sodium chloride       PRN Meds:sodium chloride, acetaminophen, oxyCODONE, heparin (porcine) **AND** heparin (porcine), carboxymethylcellulose, ALPRAZolam, sodium chloride flush, sodium chloride, sodium chloride flush, ondansetron **OR** ondansetron, melatonin  I/O last 3 completed shifts:  In: 2214.7 [I.V.:52.7; NG/GT:2162]  Out: 170 [Emesis/NG output:170]  No intake/output data recorded.    Intake/Output Summary (Last 24 hours) at 12/9/2024 0837  Last data filed at 12/9/2024 0330  Gross per 24 hour   Intake 1153.65 ml   Output 170 ml   Net 983.65 ml       CBC:   Recent Labs     12/07/24  0800 12/07/24  1526 12/08/24  0545 12/09/24  0455   WBC 15.7*  --  10.8* 7.4   HGB 6.6* 7.8* 7.6* 7.8*     --  177 173       BMP:    Recent Labs     12/07/24  0445 12/08/24  0545 
Okay to stop therapy at this time per Dr. Hoskins. 180cc of blood returned to pt prior to ending therapy. Vascath heparin locked per order.   
PHARMACY RENAL DOSING MONITORING SERVICE FOR CONTINUOUS RENAL REPLACEMENT THERAPY  Pharmacy consulted by Dr. Mike Hoskins MD for monitoring and adjustment.    Patient is currently managed on CRRT:  CVVHD-F    BFR: 200 ml/min  Dialysate: 1000 ml/hr  Replacement fluid PRE-filter: 500 mL/hr  Replacement fluid POST- filter: 300 mL/hr  Fluid removal: Net negative 100 mL/hr (may take to 200 mL/hr if BP allows)  Effluent rate: ~1700 ml/hr  16 ml/kg/hr  Estimated GFR: 44 mL/min/1.73m2     RN reported alarms or CRRT downtimes:     Pertinent Laboratory Values:   Recent Labs     11/30/24  1408 11/30/24  2215 12/01/24  0300 12/01/24  0610 12/01/24  1155   * 130*  --  130* 131*   K 4.0 3.7  --  3.4* 3.9   PHOS 1.9* 2.1*   < > 2.0*  --    CO2 29 29  --  29 29   CAION  --  1.50*  --  1.43*  --    MG 2.2 2.2  --  2.3  --    BUN 18 16  --  14 13   CREATININE 1.9* 1.7*  --  1.6* 1.6*    < > = values in this interval not displayed.       Intake/Output Summary (Last 24 hours) at 12/1/2024 1414  Last data filed at 12/1/2024 1400  Gross per 24 hour   Intake 2138.08 ml   Output 5439 ml   Net -3300.92 ml     In: 2168.1   Out: 6122      Medication requiring adjustment for CRRT: (Include Vd, MW, PB%)  Gabapentin - 100 mg qday dose okay (Vd 58 +/- 6 L,  g/mmol, PB <3%)    Medications requiring monitoring while on CRRT  Electrolytes  Vasopressor usage    Thank you for the consult.  Kaylin Smith RPH  12/1/2024 2:14 PM            
PHARMACY RENAL DOSING MONITORING SERVICE FOR CONTINUOUS RENAL REPLACEMENT THERAPY  Pharmacy consulted by Dr. Mike Hoskins MD for monitoring and adjustment.    Patient is currently managed on CRRT:  CVVHD-F    BFR: 200 ml/min  Dialysate: 1000 ml/hr  Replacement fluid PRE-filter: 500 mL/hr  Replacement fluid POST- filter: 500 mL/hr  Fluid removal: Net negative 100 mL/hr  Effluent rate: 2000 ml/hr  19 ml/kg/hr  Estimated GFR: 34 mL/min/1.73m2     RN reported alarms or CRRT downtimes:     Pertinent Laboratory Values:   Recent Labs     11/29/24  1635 11/30/24  0650   * 126*   K 3.6 3.7   CO2 29 30   BUN 16 19   CREATININE 1.5* 2.0*       Intake/Output Summary (Last 24 hours) at 11/30/2024 1201  Last data filed at 11/30/2024 0800  Gross per 24 hour   Intake 80 ml   Output 475 ml   Net -395 ml     In: 80   Out: 475      Medication requiring adjustment for CRRT: (Include Vd, MW, PB%)  Gabapentin - 100 mg qday dose okay (Vd 58 +/- 6 L,  g/mmol, PB <3%)      Medications requiring monitoring while on CRRT  Electrolytes  Vasopressor usage    Thank you for the consult.  Kaylin Smith RPH  11/30/2024 12:01 PM            
PHARMACY RENAL DOSING MONITORING SERVICE FOR CONTINUOUS RENAL REPLACEMENT THERAPY  Pharmacy consulted by Dr. Mike Hoskins MD for monitoring and adjustment.    Patient is currently managed on CRRT:  CVVHD-F    BFR: 200 ml/min  Dialysate: 700 ml/hr  Replacement fluid PRE-filter: 500 mL/hr  Replacement fluid POST- filter: 300 mL/hr  Fluid removal: Net negative 200 mL/hr   Effluent rate: ~1700 ml/hr with fluid removal  16 ml/kg/hr    RN reported alarms or CRRT downtimes: none noted    Pertinent Laboratory Values:   Recent Labs     12/01/24  1155 12/01/24  1450 12/01/24  2309 12/02/24  0520   * 131* 130* 131*   K 3.9 3.8 3.8 3.5   PHOS  --  2.0* 1.8* 2.0*   CO2 29 28 27 27   CAION  --  1.50* 1.46* 1.47*   MG  --  2.3 2.3 2.2   BUN 13 13 11 12   CREATININE 1.6* 1.6* 1.5* 1.4*       Intake/Output Summary (Last 24 hours) at 12/2/2024 0934  Last data filed at 12/2/2024 0901  Gross per 24 hour   Intake 2256.21 ml   Output 7709 ml   Net -5452.79 ml     In: 2581.5   Out: 7855 [Urine:75]     Medication requiring adjustment for CRRT: (Include Vd, MW, PB%)  Gabapentin - 100 mg qday dose okay (Vd 58 +/- 6 L,  g/mmol, PB <3%)- home dose    Medications requiring monitoring while on CRRT  Electrolytes  Vasopressor usage    Thank you for the consult.  Shanika Carrillo RPH  12/2/2024 9:34 AM            
PHARMACY RENAL DOSING MONITORING SERVICE FOR CONTINUOUS RENAL REPLACEMENT THERAPY  Pharmacy consulted by Dr. Mike Hoskins MD for monitoring and adjustment.    Patient is currently managed on CRRT:  CVVHD-F    BFR: 250 ml/min  Dialysate: 700 ml/hr  Replacement fluid PRE-filter: 500 mL/hr  Replacement fluid POST- filter: 300 mL/hr  Fluid removal: Net negative 100 mL/hr   Effluent rate: ~1600 ml/hr with fluid removal  17 ml/kg/hr    RN reported alarms or CRRT downtimes: filter clotted, approx 1 hour downtime    Pertinent Laboratory Values:   Recent Labs     12/03/24  0553 12/03/24  1358 12/04/24  0530 12/04/24  1422 12/04/24  2213 12/05/24  0511   *   < > 134* 133* 134* 133*   K 3.5   < > 3.5 3.9 3.9 3.9   PHOS 2.3*   < > 2.5 2.4* 2.6 2.3*   CO2 28   < > 28 27 27 29   CAION 1.48*  --   --   --   --   --    MG 2.2   < > 2.4 2.3 2.4 2.3   BUN 10   < > 10 11 10 11   CREATININE 1.4*   < > 1.3 1.4* 1.3 1.3    < > = values in this interval not displayed.       Intake/Output Summary (Last 24 hours) at 12/5/2024 0853  Last data filed at 12/5/2024 0801  Gross per 24 hour   Intake 1222.97 ml   Output 3789 ml   Net -2566.03 ml     In: 1284.7   Out: 3789      Medication requiring adjustment for CRRT:   Gabapentin - 100 mg qday dose okay (Vd 58 +/- 6 L,  g/mmol, PB <3%)- home dose  CRRT AC line heparin running at 125units/hr- increased to 250 units/hr  Cefepime 2gm q8    Medications requiring monitoring while on CRRT  Electrolytes  Vasopressor usage    Per Dr. Hoskins's note- plan for stopping CRRT when the current filter clots.    Thank you for the consult.  Shanika Carrillo [facility]  12/5/2024 8:53 AM    
PHARMACY RENAL DOSING MONITORING SERVICE FOR CONTINUOUS RENAL REPLACEMENT THERAPY  Pharmacy consulted by Dr. Mike Hoskins MD for monitoring and adjustment.    Patient is currently managed on CRRT:  CVVHD-F    BFR: 250 ml/min  Dialysate: 700 ml/hr  Replacement fluid PRE-filter: 500 mL/hr  Replacement fluid POST- filter: 300 mL/hr  Fluid removal: Net negative 100 mL/hr   Effluent rate: ~1600 ml/hr with fluid removal  17 ml/kg/hr    RN reported alarms or CRRT downtimes: none noted    Pertinent Laboratory Values:   Recent Labs     12/02/24  0920 12/02/24  1322 12/02/24  2215 12/03/24  0553   * 132* 131* 132*   K 3.6 3.6 3.7 3.5   PHOS 2.2* 2.8 2.4* 2.3*   CO2 29 28 28 28   CAION  --  1.49* 1.53* 1.48*   MG  --  2.4 2.3 2.2   BUN 11 11 10 10   CREATININE 1.5* 1.4* 1.4* 1.4*       Intake/Output Summary (Last 24 hours) at 12/3/2024 1143  Last data filed at 12/3/2024 1100  Gross per 24 hour   Intake 2039.48 ml   Output 58193 ml   Net -8941.52 ml     In: 2707.7   Out: 96150 [Urine:50]     Medication requiring adjustment for CRRT: (Include Vd, MW, PB%)  Gabapentin - 100 mg qday dose okay (Vd 58 +/- 6 L,  g/mmol, PB <3%)- home dose    Medications requiring monitoring while on CRRT  Electrolytes  Vasopressor usage    Thank you for the consult.  Kaylin Smith RPH  12/3/2024 11:43 AM    
PHARMACY RENAL DOSING MONITORING SERVICE FOR CONTINUOUS RENAL REPLACEMENT THERAPY  Pharmacy consulted by Dr. Mike Hosknis MD for monitoring and adjustment.    Patient is currently managed on CRRT:  CVVHD-F    BFR: 250 ml/min  Dialysate: 700 ml/hr  Replacement fluid PRE-filter: 500 mL/hr  Replacement fluid POST- filter: 300 mL/hr  Fluid removal: Net negative 100 mL/hr   Effluent rate: ~1600 ml/hr with fluid removal  17 ml/kg/hr    RN reported alarms or CRRT downtimes: filter clotted, approx 1 hour downtime    Pertinent Laboratory Values:   Recent Labs     12/03/24  0553 12/03/24  1358 12/03/24  2343 12/04/24  0530   * 134* 134* 134*   K 3.5 3.7 3.7 3.5   PHOS 2.3* 2.4* 1.8* 2.5   CO2 28 28 27 28   CAION 1.48*  --   --   --    MG 2.2 2.3 2.3 2.4   BUN 10 9 10 10   CREATININE 1.4* 1.3 1.4* 1.3       Intake/Output Summary (Last 24 hours) at 12/4/2024 0939  Last data filed at 12/4/2024 0905  Gross per 24 hour   Intake 1632.03 ml   Output 4089 ml   Net -2456.97 ml     In: 1787   Out: 4453      Medication requiring adjustment for CRRT: (Include Vd, MW, PB%)  Gabapentin - 100 mg qday dose okay (Vd 58 +/- 6 L,  g/mmol, PB <3%)- home dose  CRRT AC line heparin running at 125units/hr- increased to 250 units/hr    Medications requiring monitoring while on CRRT  Electrolytes  Vasopressor usage    Thank you for the consult.  Shanika Carrillo [facility]  12/4/2024 9:39 AM    
Patient is refusing to come off ventilator at this time. He is mouthing ,\"may try tomorrow\".   
Patient refused his breathing treatments. Stated he did not want the treatment and it would bother him while he was watching TV.   
Patient seen and evaluated  BP still low normal 102  No new clinical changes  Labs reviewed  K normal  Will re evaluate dialysis needs in am    
Phosphorus 2.3.  Sodium Phosphate 6 mmol given for replacement.   
Phosphorus 2.4.  Replaced with Sodium Phosphate 6 mmol.  
Placed air in patient cuff multiple times today.  
Pt left  at this time to be transported by [facility] to [place]. Report called to [place]. All questions answered at this time.   
Pt refused bath for this shift, stated he was in too much pain at this time.  
Pt trach changed to #8 yared since no #7 yared available  
Purple  top sent to lab.   
Received call from lab reporting critical value hgb 6.6. Notified primary RNJeffrey   
Spiritual Health History and Assessment/Progress Note  [facility]    Loneliness/Social Isolation,  ,  ,      Name: Tico Day MRN: [id number]    Age: 64 y.o.     Sex: male   Language: English   Mandaeism: None   Decompensation of cirrhosis of liver (HCC)     Date: 12/2/2024            Total Time Calculated: 9 min              Spiritual Assessment began in [facility] ICU        Referral/Consult From: Rounding   Encounter Overview/Reason: Loneliness/Social Isolation  Service Provided For: Patient    Sybil, Belief, Meaning:   Patient has beliefs or practices that help with coping during difficult times  Family/Friends No family/friends present      Importance and Influence:  Patient has spiritual/personal beliefs that influence decisions regarding their health  Family/Friends No family/friends present    Community:  Patient feels well-supported. Support system includes: Spouse/Partner and Extended family  Family/Friends No family/friends present    Assessment and Plan of Care:     Patient Interventions include: Facilitated expression of thoughts and feelings  Family/Friends Interventions include: No family/friends present    Patient Plan of Care: Spiritual Care available upon further referral  Family/Friends Plan of Care: No family/friends present    Electronically signed by Chaplain Garret on 12/2/2024 at 10:10 AM   
Spiritual Health History and Assessment/Progress Note  [facility]    Loneliness/Social Isolation,  ,  ,      Name: Tioc Day MRN: [id number]    Age: 64 y.o.     Sex: male   Language: English   Sabianism: None   Decompensation of cirrhosis of liver (HCC)     Date: 12/6/2024            Total Time Calculated: 7 min              Spiritual Assessment continued in [facility] ICU        Referral/Consult From: Rounding   Encounter Overview/Reason: Loneliness/Social Isolation  Service Provided For: Patient    Sybil, Belief, Meaning:   Patient unable to assess at this time  Family/Friends No family/friends present      Importance and Influence:  Patient has spiritual/personal beliefs that influence decisions regarding their health  Family/Friends No family/friends present    Community:  Patient feels well-supported. Support system includes: Spouse/Partner and Extended family  Family/Friends No family/friends present    Assessment and Plan of Care:     Patient Interventions include: Facilitated expression of thoughts and feelings  Family/Friends Interventions include: No family/friends present    Patient Plan of Care: Spiritual Care available upon further referral  Family/Friends Plan of Care: No family/friends present    Electronically signed by Chaplain Garret on 12/6/2024 at 12:16 PM   
Spoke with [facility] re: IR consult for Thoracentesis and Paracentesis. Paracentesis was completed earlier today by critical care and they verbalized cancellation of thoracentesis for now.   
Transport here to d/c patient to [place]. Primary RN in room assisting patient. AVS, Facesheet, transfer report and superior paper all filled out and provided.   
12/03/24 2000 12/03/24 2013 12/03/24 2031 12/03/24 2100   BP: 133/61   120/61   Pulse: 95   91   Resp: 19 21 22 23   Temp: 99.1 °F (37.3 °C)      TempSrc: Rectal      SpO2: 97%   100%   Weight:       Height:         BMI: Body mass index is 29.85 kg/m².  General: Awake, ill-appearing.    HEENT: PERRLA. Vision grossly intact. Normal hearing. Oropharynx clear.  Neck: Supple. No JVD. Tracheostomy in place.  CV: RRR. NL S1/S2. 1+ BLE edema.   Pulm: Increased effort on vent. Decreased BS over bilateral bases.   GI: +BS x4, moderately distended with fluid wave, nontender.  : No CVA tenderness.  Skin: Intact, warm and dry.  MSK: No gross joint deformities. Full ROM.   Neuro: AAOx3. CNs grossly intact. No focal deficit.   Psych: Calm.    Past History:     PMHx:   Past Medical History:   Diagnosis Date    Acute and chronic respiratory failure with hypoxia     Anxiety     COPD (chronic obstructive pulmonary disease) ([facility])     DM type 2 (diabetes mellitus, type 2) ([facility])     Dysphagia, oropharyngeal     End stage renal disease ([facility])     Malnutrition of moderate degree ([facility])     Morbid obesity     Sleep apnea     Ventilator dependent ([facility])        PSHx:   Past Surgical History:   Procedure Laterality Date    IR TUNNELED CATHETER PLACEMENT GREATER THAN 5 YEARS  11/6/2024    IR TUNNELED CATHETER PLACEMENT GREATER THAN 5 YEARS 11/6/2024 [facility] SPECIAL PROCEDURES       Allergies:   Allergies   Allergen Reactions    Reglan [Metoclopramide]     Remeron [Mirtazapine]        FHx: family history is not on file.    SHx:   Social History     Socioeconomic History    Marital status:      Spouse name: None    Number of children: None    Years of education: None    Highest education level: None   Tobacco Use    Smoking status: Former     Current packs/day: 0.50     Average packs/day: 0.5 packs/day for 32.7 years (16.4 ttl pk-yrs)     Types: Cigarettes     Start date: 3/7/1992     Passive exposure: Never    Smokeless tobacco: Never 
[facility]  [phone]        Subjective:    No acute overnight events reported    Low-level ventilatory support based on current FiO2 PEEP requirement    Continuous renal replacement therapy -100 cc/h output    Levophed infusion 7 mcg for hemodynamic support    Sinus rhythm per bedside monitor      Review of Systems     Unable to obtain review of system given current circumstances    Physical Exam:          /61   Pulse (!) 104   Temp 99.2 °F (37.3 °C) (Oral)   Resp 29   Ht 1.753 m (5' 9\")   Wt 87.5 kg (192 lb 14.4 oz)   SpO2 98%   BMI 28.49 kg/m²       General: Chronically ill-appearing male, awake interactive vitals reviewed  Eyes: Pupils are reactive motor intact  ENT: Head normal.  Hearing intact.  Dry oral mucosa.  Neck tracheostomy device  Cardiovascular: Regular rate faint systolic murmur.  Respiratory: Clear to auscultation  Gastrointestinal: Distended abdomen, superficial venous structures noted over the anterior abdominal wall, lower abdominal wall edema extending into the thighs. Small periumbilical hernia.  Feeding tube  Genitourinary: no suprapubic tenderness  Musculoskeletal: Lower extremity edema with venous stasis changes, several wounds  Skin: warm, dry  Neuro: Arouses, interacts to a certain extent, follows some simple commands  Psych: Mood appropriate.    Current Medications:   cefepime  1,000 mg IntraVENous Q24H    midodrine  15 mg PEG Tube q8h    epoetin gabriel-epbx  10,000 Units SubCUTAneous Once per day on Tuesday Thursday Saturday    albuterol  2.5 mg Nebulization Q4H    And    sodium chloride (Inhalant)  4 mL Nebulization Q4H    sodium chloride flush  5-40 mL IntraVENous 2 times per day    miconazole   Topical BID    atorvastatin  40 mg PEG Tube Nightly    gabapentin  100 mg PEG Tube Daily    lansoprazole  30 mg PEG Tube Daily    sucralfate  1 g PEG Tube 3 times per day    traZODone  50 mg PEG Tube Nightly    sodium chloride flush  5-40 mL IntraVENous 2 times per day    lactulose  
Requirements: Ideal  Protein (g/day): 182.5 (2.5g/kg)  Method Used for Fluid Requirements: Defer to provider  Fluid (ml/day):      Nutrition Diagnosis:   Inadequate oral intake related to impaired respiratory function as evidenced by NPO or clear liquid status due to medical condition, nutrition support - enteral nutrition    Nutrition Interventions:   Food and/or Nutrient Delivery: Continue NPO, Modify Tube Feeding  Nutrition Education/Counseling: No recommendation at this time  Coordination of Nutrition Care: Continue to monitor while inpatient  Plan of Care discussed with: MD caraballo    Goals:  Goals: Meet at least 75% of estimated needs, by next RD assessment  Type of Goal: Continue current goal  Previous Goal Met: Progressing toward Goal(s)    Nutrition Monitoring and Evaluation:   Behavioral-Environmental Outcomes: None Identified  Food/Nutrient Intake Outcomes: Enteral Nutrition Intake/Tolerance  Physical Signs/Symptoms Outcomes: Biochemical Data, Nutrition Focused Physical Findings, Skin, Weight, GI Status    Discharge Planning:    Enteral Nutrition     Pascale Riddle RD  Contact: [phone]    
Hunger Vital Sign     Worried About Running Out of Food in the Last Year: Never true     Ran Out of Food in the Last Year: Never true   Transportation Needs: No Transportation Needs (12/1/2024)    PRAPARE - Transportation     Lack of Transportation (Medical): No     Lack of Transportation (Non-Medical): No   Intimate Partner Violence: Not At Risk (8/25/2024)    Received from [facility]    Humiliation, Afraid, Rape, and Kick questionnaire     Fear of Current or Ex-Partner: No     Emotionally Abused: No     Physically Abused: No     Sexually Abused: No   Housing Stability: Low Risk  (12/1/2024)    Housing Stability Vital Sign     Unable to Pay for Housing in the Last Year: No     Number of Times Moved in the Last Year: 1     Homeless in the Last Year: No       Medications Prior to Admission     Prior to Admission medications    Medication Sig Start Date End Date Taking? Authorizing Provider   acetaminophen (TYLENOL) 500 MG tablet 2 tablets by Enteral route every 8 hours as needed 8/6/24  Yes Harsh Malhotra MD   albuterol (PROVENTIL) (2.5 MG/3ML) 0.083% nebulizer solution Inhale 3 mLs into the lungs every 4 hours 8/6/24  Yes Harsh Malhotra MD   atorvastatin (LIPITOR) 40 MG tablet 1 tablet by Enteral route nightly 5/20/24  Yes Harsh Malhotra MD   gabapentin (NEURONTIN) 100 MG capsule 1 capsule by Per G Tube route daily.   Yes Harsh Malhotra MD   loperamide (IMODIUM) 2 MG capsule Take 1 capsule by mouth as needed for Diarrhea 8/6/24  Yes Harsh Malhotra MD   melatonin 3 MG TABS tablet Take 1 tablet by mouth nightly as needed (insomnia) 6/24/24  Yes Harsh Malhotra MD   midodrine (PROAMATINE) 10 MG tablet Take 1 tablet by mouth as needed (Every monday, wednesday, and friday for hypotension) 6/24/24  Yes Harsh Malhotra MD   oxyCODONE (ROXICODONE) 5 MG immediate release tablet 1 tablet by Enteral route every 8 hours as needed. 8/6/24  Yes Harsh Malhotra MD 
Topical BID    atorvastatin  40 mg PEG Tube Nightly    gabapentin  100 mg PEG Tube Daily    lansoprazole  30 mg PEG Tube Daily    sucralfate  1 g PEG Tube 3 times per day    traZODone  50 mg PEG Tube Nightly    sodium chloride flush  5-40 mL IntraVENous 2 times per day    lactulose  20 g PEG Tube TID    rifAXIMin  550 mg PEG Tube BID    heparin (porcine)  5,000 Units SubCUTAneous 3 times per day       Labs, Imaging and Studies reviewed:    No results found.    Recent Results (from the past 24 hour(s))   TYPE AND SCREEN    Collection Time: 12/07/24  9:20 AM   Result Value Ref Range    Blood Bank Sample Expiration 12/10/2024,2359     ABO/Rh A POSITIVE     Antibody Screen NEGATIVE     Blood Bank Comment  +PP     Unit Number [id number]     Component Leukocyte Reduced Red Cell     Unit Divison 00     Dispense Status Blood Bank TRANSFUSED     Unit Issue Date/Time [id number]     Product Code Blood Bank [id number]     Blood Bank Unit Type and Rh A POS     Blood Bank ISBT Product Blood Type 6200     Blood Bank Blood Product Expiration Date 315925560266     Transfusion Status OK TO TRANSFUSE     Crossmatch Result COMPATIBLE    Hemoglobin and Hematocrit    Collection Time: 12/07/24  3:26 PM   Result Value Ref Range    Hemoglobin 7.8 (L) 13.5 - 18.0 g/dL    Hematocrit 25.3 (L) 42.0 - 52.0 %   Hepatic Function Panel    Collection Time: 12/08/24  5:45 AM   Result Value Ref Range    Albumin 2.5 (L) 3.4 - 5.0 g/dL    Alkaline Phosphatase 147 (H) 40 - 129 U/L    ALT 10 10 - 40 U/L    AST 26 15 - 37 U/L    Total Bilirubin 0.5 0.0 - 1.0 mg/dL    Bilirubin, Direct 0.3 0.0 - 0.3 mg/dL    Bilirubin, Indirect 0.3 0.0 - 0.7 mg/dL    Total Protein 7.0 6.4 - 8.2 g/dL    Albumin/Globulin Ratio 0.6 (L) 1.1 - 2.2   Protime-INR    Collection Time: 12/08/24  5:45 AM   Result Value Ref Range    Protime 14.8 (H) 11.7 - 14.5 sec    INR 1.1    Basic Metabolic Panel    Collection Time: 12/08/24  5:45 AM   Result Value Ref Range    Sodium 128 (L) 
6/24/24  Yes Harsh Malhotra MD   oxyCODONE (ROXICODONE) 5 MG immediate release tablet 1 tablet by Enteral route every 8 hours as needed. 8/6/24  Yes Harsh Malhotra MD   lansoprazole (PREVACID SOLUTAB) 30 MG disintegrating tablet 1 tablet by Enteral route daily 8/7/24  Yes Harsh Malhotra MD   sodium chloride, Inhalant, 7 % nebulizer solution Inhale 4 mLs into the lungs in the morning and 4 mLs at noon and 4 mLs in the evening. 8/6/24  Yes Harsh Malhotra MD   traZODone (DESYREL) 50 MG tablet Take 1 tablet by mouth nightly 6/24/24  Yes Harsh Malhotra MD   ALPRAZolam (XANAX) 0.5 MG tablet Take 1 tablet by mouth every 6 hours as needed for Anxiety.   Yes Harsh Malhotra MD   ondansetron (ZOFRAN-ODT) 8 MG TBDP disintegrating tablet Take 1 tablet by mouth every 8 hours as needed for Nausea or Vomiting   Yes Harsh Malhotra MD   sucralfate (CARAFATE) 1 GM/10ML suspension 10 mLs by Per G Tube route in the morning, at noon, and at bedtime   Yes Harsh Malhotra MD   sennosides (SENOKOT) 8.8 MG/5ML syrup 5 mLs by Enteral route daily as needed 8/6/24   Harsh Malhotra MD       Medications:     Medications:    albumin human 25%  25 g IntraVENous Q6H    midodrine  10 mg PEG Tube q8h    sodium chloride flush  5-40 mL IntraVENous 2 times per day    miconazole   Topical BID    atorvastatin  40 mg PEG Tube Nightly    gabapentin  100 mg PEG Tube Daily    lansoprazole  30 mg PEG Tube Daily    sucralfate  1 g PEG Tube 3 times per day    traZODone  50 mg PEG Tube Nightly    sodium chloride flush  5-40 mL IntraVENous 2 times per day    lactulose  20 g PEG Tube TID    rifAXIMin  550 mg PEG Tube BID    heparin (porcine)  5,000 Units SubCUTAneous 3 times per day        Infusions:    heparin (porcine) 5,000 Units in sodium chloride 0.9 % 20 mL infusion      norepinephrine 3 mcg/min (12/02/24 1805)    [facility] BGK 4/2.5 500 mL/hr at 12/02/24 0515    [facility] BGK 4/2.5 300 mL/hr at 
Infection, and Procedural   Teaching Tools: Explanation   Response to Education: Verbalized Understanding and Requires Follow-up     Electronically signed by Corin Patel RN on 12/9/2024 at 5:19 PM  
PRN  carboxymethylcellulose, 1 drop, Q2H PRN  ALPRAZolam, 0.5 mg, 4x Daily PRN  sodium chloride flush, 5-40 mL, PRN  sodium chloride, , PRN  sodium chloride flush, 5-40 mL, PRN  ondansetron, 4 mg, Q8H PRN   Or  ondansetron, 4 mg, Q6H PRN  melatonin, 3 mg, Nightly PRN        Data:     CBC:   Recent Labs     12/07/24  0800 12/07/24  1526 12/08/24  0545 12/09/24  0455   WBC 15.7*  --  10.8* 7.4   HGB 6.6* 7.8* 7.6* 7.8*     --  177 173   MCV 97.8  --  94.0 94.8   RDW 18.4*  --  17.9* 17.6*   LYMPHOPCT  --   --  11* 15*   MONOPCT  --   --  8* 8*   EOSPCT  --   --  4* 5*   BASOPCT  --   --  0 1   MONOSABS  --   --  0.82 0.59   LYMPHSABS  --   --  1.19 1.11   EOSABS  --   --  0.38 0.37   BASOSABS  --   --  0.03 0.05     CMP:    Recent Labs     12/07/24  0445 12/08/24  0545 12/09/24  0455   * 128* 127*   K 4.3 3.3* 3.5   CL 99 95* 94*   CO2 26 27 26   BUN 34* 27* 37*   CREATININE 2.5* 2.3* 2.8*   GLUCOSE 89 87 68*   CALCIUM 12.6* 12.3* 13.6*   BILITOT 0.4 0.5 0.6   ALKPHOS 141* 147* 137*   AST 24 26 25   ALT 9* 10 9*     Lipids: No results found for: \"CHOL\", \"HDL\", \"TRIG\"  Hemoglobin A1C: No results found for: \"LABA1C\"  TSH:   Lab Results   Component Value Date/Time    TSH 3.98 11/03/2024 04:41 AM     Troponin: No results found for: \"TROPONINT\"  BNP:   No results for input(s): \"PROBNP\" in the last 72 hours.    Lactic Acid:   No results for input(s): \"LACTA\" in the last 72 hours.    UA:No results found for: \"NITRU\", \"COLORU\", \"PHUR\", \"LABCAST\", \"WBCUA\", \"RBCUA\", \"MUCUS\", \"TRICHOMONAS\", \"YEAST\", \"BACTERIA\", \"CLARITYU\", \"SPECGRAV\", \"LEUKOCYTESUR\", \"UROBILINOGEN\", \"BILIRUBINUR\", \"BLOODU\", \"GLUCOSEU\", \"KETUA\", \"AMORPHOUS\"  Urine Cultures: No results found for: \"LABURIN\"  Blood Cultures: No results found for: \"BC\"  No results found for: \"BLOODCULT2\"  Organism: No results found for: \"ORG\"    Radiology results:  CT CHEST ABDOMEN PELVIS WO CONTRAST Additional Contrast? None   Final Result      1. Large bilateral 
chloride flush, 5-40 mL, PRN  sodium chloride, , PRN  sodium chloride flush, 5-40 mL, PRN  ondansetron, 4 mg, Q8H PRN   Or  ondansetron, 4 mg, Q6H PRN  melatonin, 3 mg, Nightly PRN        Data:     CBC:   Recent Labs     12/07/24  0800 12/07/24  1526 12/08/24  0545   WBC 15.7*  --  10.8*   HGB 6.6* 7.8* 7.6*     --  177   MCV 97.8  --  94.0   RDW 18.4*  --  17.9*   LYMPHOPCT  --   --  11*   MONOPCT  --   --  8*   EOSPCT  --   --  4*   BASOPCT  --   --  0   MONOSABS  --   --  0.82   LYMPHSABS  --   --  1.19   EOSABS  --   --  0.38   BASOSABS  --   --  0.03     CMP:    Recent Labs     12/06/24  0545 12/07/24  0445 12/08/24  0545   * 130* 128*   K 4.1 4.3 3.3*    99 95*   CO2 27 26 27   BUN 21* 34* 27*   CREATININE 2.0* 2.5* 2.3*   GLUCOSE 101* 89 87   CALCIUM 11.6* 12.6* 12.3*   BILITOT 0.5 0.4 0.5   ALKPHOS 144* 141* 147*   AST 29 24 26   ALT 10 9* 10     Lipids: No results found for: \"CHOL\", \"HDL\", \"TRIG\"  Hemoglobin A1C: No results found for: \"LABA1C\"  TSH:   Lab Results   Component Value Date/Time    TSH 3.98 11/03/2024 04:41 AM     Troponin: No results found for: \"TROPONINT\"  BNP:   No results for input(s): \"PROBNP\" in the last 72 hours.    Lactic Acid:   No results for input(s): \"LACTA\" in the last 72 hours.    UA:No results found for: \"NITRU\", \"COLORU\", \"PHUR\", \"LABCAST\", \"WBCUA\", \"RBCUA\", \"MUCUS\", \"TRICHOMONAS\", \"YEAST\", \"BACTERIA\", \"CLARITYU\", \"SPECGRAV\", \"LEUKOCYTESUR\", \"UROBILINOGEN\", \"BILIRUBINUR\", \"BLOODU\", \"GLUCOSEU\", \"KETUA\", \"AMORPHOUS\"  Urine Cultures: No results found for: \"LABURIN\"  Blood Cultures: No results found for: \"BC\"  No results found for: \"BLOODCULT2\"  Organism: No results found for: \"ORG\"    Radiology results:  CT CHEST ABDOMEN PELVIS WO CONTRAST Additional Contrast? None   Final Result      1. Large bilateral pleural effusions with compressive atelectasis. Underlying   infection cannot be completely excluded. Suspected loculation of the left   pleural effusion versus 
decompensation,  [] Electrolyte instability  [] Suctioning every 2 hours  [] Every hour neuro checks  [] Every hour neurovascular checks  [x] Frequent evaluation of patient's response to treatment and titration of therapies,  [x] Interpretation of laboratory and radiological data,  [x] Application and interpretation of advanced monitoring technologies,  [x] Extensive interpretation of multiple databases,  [x] Development of treatment plan with patient, surrogate, or consultants.  [] Others:    Electronically signed by Zoraida Perez MD on 12/9/2024 at 2:37 PM  
Infection, Fluid Management, Albumin, Procedural, Medications, Treatment Options, Potassium, Diet, and Transplant   Teaching Tools: Explanation   Response to Education: Verbalized Understanding and Requires Follow-up     Electronically signed by Tiffanie King RN on 12/7/2024 at 1:17 PM  
completely decompressed. - REPRODUCTIVE ORGANS: No pelvic masses. - BOWEL: Evaluation of the bowel loops are limited due to lack of oral and IV contrast. Residual oral contrast material is demonstrated within the large bowel. A G-tube is demonstrated. The stomach appears grossly unremarkable. Prominent loops of small bowel, no wall thickening or adjacent stranding. Large bowel appears grossly unremarkable. The appendix is not clearly visualized. - LYMPH NODES: Prominent mesenteric lymph nodes. - BONES: Degenerative changes of the thoracolumbar spine. - VASCULATURE: Mild atherosclerotic calcification of the abdominal aorta and branches. - OTHER: Massive intra-abdominal ascites. Right upper quadrant coils are demonstrated.     1. Large bilateral pleural effusions with compressive atelectasis. Underlying infection cannot be completely excluded. Suspected loculation of the left pleural effusion versus empyema. 2. Cirrhotic liver with massive intra-abdominal ascites. 3. Nonobstructing left renal calculi. Electronically signed by Crystal Rodríguez      Electronically signed by Coby De La Cruz MD on 12/1/2024 at 1:18 PM

## 2024-12-11 NOTE — DISCHARGE SUMMARY
Resp 19   Ht 1.753 m (5' 9\")   Wt 85 kg (187 lb 6.3 oz)   SpO2 98%   BMI 27.67 kg/m²       Physical Exam:   General: Chronically ill-appearing male, awake interactive vitals reviewed  Eyes: Pupils are reactive motor intact  ENT: Head normal.  Hearing intact.  Dry oral mucosa.  Neck tracheostomy device  Cardiovascular: Regular rate faint systolic murmur.  Respiratory: Clear to auscultation  Gastrointestinal: Distended abdomen, Small periumbilical hernia, freely reducible.  Feeding tube  Genitourinary: no suprapubic tenderness  Musculoskeletal: Lower extremity edema with stasis changes  Skin: warm, dry  Neuro: Arouses, interacts to a certain extent, follows some simple commands  Psych: Mood appropriate.        Labs and Imaging   No results found.    CBC:   Recent Labs     12/09/24 0455   WBC 7.4   HGB 7.8*        BMP:    Recent Labs     12/09/24 0455   *   K 3.5   CL 94*   CO2 26   BUN 37*   CREATININE 2.8*   GLUCOSE 68*     Hepatic:   Recent Labs     12/09/24 0455   AST 25   ALT 9*   BILITOT 0.6   ALKPHOS 137*     Lipids: No results found for: \"CHOL\", \"HDL\", \"TRIG\"  Hemoglobin A1C: No results found for: \"LABA1C\"  TSH:   Lab Results   Component Value Date/Time    TSH 3.98 11/03/2024 04:41 AM     Troponin: No results found for: \"TROPONINT\"  Lactic Acid: No results for input(s): \"LACTA\" in the last 72 hours.  BNP: No results for input(s): \"PROBNP\" in the last 72 hours.  UA:No results found for: \"NITRU\", \"COLORU\", \"PHUR\", \"LABCAST\", \"WBCUA\", \"RBCUA\", \"MUCUS\", \"TRICHOMONAS\", \"YEAST\", \"BACTERIA\", \"CLARITYU\", \"SPECGRAV\", \"LEUKOCYTESUR\", \"UROBILINOGEN\", \"BILIRUBINUR\", \"BLOODU\", \"GLUCOSEU\", \"KETUA\", \"AMORPHOUS\"  Urine Cultures: No results found for: \"LABURIN\"  Blood Cultures: No results found for: \"BC\"  No results found for: \"BLOODCULT2\"  Organism: No results found for: \"ORG\"    Time Spent Discharging patient 35 minutes    Electronically signed by Ira Samuel PA-C on 12/11/2024 at 8:13 AM

## 2025-01-21 NOTE — PROGRESS NOTES
1/21/25 - Patient is a resident at [facility], spoke with his nurse Dinora. Patient will arrive on 1/23/25 at 1000, for his paracentesis at 1100. Patient may take his morning medications and eat a light breakfast.  His nurse stated patient was not sure if he could wait till Thursday (1/23/25). Per his Nurse, he will be \"squaded out\" if needed.

## 2025-01-23 ENCOUNTER — HOSPITAL ENCOUNTER (OUTPATIENT)
Dept: INTERVENTIONAL RADIOLOGY/VASCULAR | Age: 65
Discharge: HOME | End: 2025-01-23
Payer: MEDICARE

## 2025-01-23 VITALS
WEIGHT: 187 LBS | SYSTOLIC BLOOD PRESSURE: 112 MMHG | DIASTOLIC BLOOD PRESSURE: 62 MMHG | HEIGHT: 69 IN | RESPIRATION RATE: 16 BRPM | OXYGEN SATURATION: 100 % | BODY MASS INDEX: 27.7 KG/M2 | HEART RATE: 80 BPM

## 2025-01-23 DIAGNOSIS — R18.0 ASCITES, MALIGNANT: ICD-10-CM

## 2025-01-23 LAB
ERYTHROCYTE [DISTWIDTH] IN BLOOD BY AUTOMATED COUNT: 14.5 % (ref 11.7–14.9)
HCT VFR BLD AUTO: 31.7 % (ref 42–52)
HGB BLD-MCNC: 9.7 G/DL (ref 13.5–18)
INR PPP: 1.1
MCH RBC QN AUTO: 28.7 PG (ref 27–31)
MCHC RBC AUTO-ENTMCNC: 30.6 G/DL (ref 32–36)
MCV RBC AUTO: 93.8 FL (ref 78–100)
PARTIAL THROMBOPLASTIN TIME: 35.8 SEC (ref 25.1–37.1)
PLATELET # BLD AUTO: 187 K/UL (ref 140–440)
PMV BLD AUTO: 8.6 FL (ref 7.5–11.1)
PROTHROMBIN TIME: 14.6 SEC (ref 11.7–14.5)
RBC # BLD AUTO: 3.38 M/UL (ref 4.6–6.2)
WBC OTHER # BLD: 5.1 K/UL (ref 4–10.5)

## 2025-01-23 PROCEDURE — C1729 CATH, DRAINAGE: HCPCS

## 2025-01-23 PROCEDURE — 6360000002 HC RX W HCPCS

## 2025-01-23 PROCEDURE — P9047 ALBUMIN (HUMAN), 25%, 50ML: HCPCS

## 2025-01-23 PROCEDURE — P9047 ALBUMIN (HUMAN), 25%, 50ML: HCPCS | Performed by: RADIOLOGY

## 2025-01-23 PROCEDURE — 85730 THROMBOPLASTIN TIME PARTIAL: CPT

## 2025-01-23 PROCEDURE — 49083 ABD PARACENTESIS W/IMAGING: CPT

## 2025-01-23 PROCEDURE — 85027 COMPLETE CBC AUTOMATED: CPT

## 2025-01-23 PROCEDURE — 6360000002 HC RX W HCPCS: Performed by: RADIOLOGY

## 2025-01-23 PROCEDURE — 85610 PROTHROMBIN TIME: CPT

## 2025-01-23 RX ORDER — LIDOCAINE HYDROCHLORIDE 10 MG/ML
INJECTION, SOLUTION EPIDURAL; INFILTRATION; INTRACAUDAL; PERINEURAL PRN
Status: COMPLETED | OUTPATIENT
Start: 2025-01-23 | End: 2025-01-23

## 2025-01-23 RX ORDER — ALBUMIN (HUMAN) 12.5 G/50ML
SOLUTION INTRAVENOUS CONTINUOUS PRN
Status: COMPLETED | OUTPATIENT
Start: 2025-01-23 | End: 2025-01-23

## 2025-01-23 RX ADMIN — LIDOCAINE HYDROCHLORIDE 10 ML: 10 INJECTION, SOLUTION EPIDURAL; INFILTRATION; INTRACAUDAL; PERINEURAL at 10:58

## 2025-01-23 RX ADMIN — ALBUMIN (HUMAN) 87.5 G: 0.25 INJECTION, SOLUTION INTRAVENOUS at 11:48

## 2025-01-23 ASSESSMENT — PAIN - FUNCTIONAL ASSESSMENT: PAIN_FUNCTIONAL_ASSESSMENT: NONE - DENIES PAIN

## 2025-01-23 NOTE — PROGRESS NOTES
TRANSFER - OUT REPORT:    Verbal report given to Alma Delia RN and Analy RN on Tico Day being transferred to IR holding for routine post-op       Report consisted of patient's Situation, Background, Assessment and   Recommendations(SBAR).     Information from the following report(s) Nurse Handoff Report was reviewed with the receiving nurse.    Opportunity for questions and clarification was provided.      Patient transported with:   Registered Nurse    Successful paracentesis. [id number] of clear yellow fluid was drained. 87.5g of albumin was given

## 2025-01-23 NOTE — CONSULTS
Consult Interventional Radiology    Date:2025  Name:Tico Day   :1960   MR#:[id number]    SEX:male     Planned procedure:  paracentesis  Indication: Ascites    H&P Status: H&P was reviewed, the patient was examined and no change has occurred in patient's condition since H&P was completed.    Past Medical History:  Past Medical History:   Diagnosis Date    Acute and chronic respiratory failure with hypoxia     Anxiety     COPD (chronic obstructive pulmonary disease) (HCC)     DM type 2 (diabetes mellitus, type 2) (HCC)     Dysphagia, oropharyngeal     End stage renal disease (HCC)     Malnutrition of moderate degree (HCC)     Morbid obesity     Sleep apnea     Ventilator dependent (HCC)         Past Surgical History:  Past Surgical History:   Procedure Laterality Date    IR TUNNELED CVC PLACE WO SQ PORT/PUMP > 5 YEARS  2024    IR TUNNELED CATHETER PLACEMENT GREATER THAN 5 YEARS 2024 [facility] SPECIAL PROCEDURES       Social History:  Social History     Socioeconomic History    Marital status:      Spouse name: Not on file    Number of children: Not on file    Years of education: Not on file    Highest education level: Not on file   Occupational History    Not on file   Tobacco Use    Smoking status: Former     Current packs/day: 0.50     Average packs/day: 0.5 packs/day for 32.9 years (16.4 ttl pk-yrs)     Types: Cigarettes     Start date: 3/7/1992     Passive exposure: Never    Smokeless tobacco: Never   Substance and Sexual Activity    Alcohol use: Never    Drug use: Not on file    Sexual activity: Not on file   Other Topics Concern    Not on file   Social History Narrative    Not on file     Social Determinants of Health     Financial Resource Strain: Not on file   Food Insecurity: No Food Insecurity (2024)    Hunger Vital Sign     Worried About Running Out of Food in the Last Year: Never true     Ran Out of Food in the Last Year: Never true   Transportation Needs: No

## 2025-01-23 NOTE — BRIEF OP NOTE
Department of Interventional Radiology Post-Procedure Note      Date: 1/23/2025     Interventional Radiologist: Ignacio    Procedure Performed:  paracentesis    H&P Status: H&P was reviewed, the patient was examined and no change has occurred in patient's condition since H&P was completed.    Pre-procedure Diagnosis:  ascites    Post-procedure Diagnosis:  Same    Sedation:  none    Contrast used:  none    Estimated blood loss:  Minimal    Preliminary Findings:  Successful    Complications:  none    Full Report to Follow.    Electronically signed by Saud Pierre MD on 1/23/2025 at 11:05 AM

## 2025-01-23 NOTE — PROGRESS NOTES
Patient returned to IR holding.  Dressing dry and intact.  BP taken.  Instructions reviewed.  Patient escorted to front lobby in patient's own wheelchair.

## 2025-02-02 ENCOUNTER — APPOINTMENT (OUTPATIENT)
Dept: GENERAL RADIOLOGY | Age: 65
DRG: 208 | End: 2025-02-02
Payer: MEDICARE

## 2025-02-02 ENCOUNTER — HOSPITAL ENCOUNTER (INPATIENT)
Age: 65
LOS: 4 days | Discharge: NURSING, REHAB OR LONG-TERM CARE | DRG: 208 | End: 2025-02-07
Attending: EMERGENCY MEDICINE | Admitting: STUDENT IN AN ORGANIZED HEALTH CARE EDUCATION/TRAINING PROGRAM
Payer: MEDICARE

## 2025-02-02 DIAGNOSIS — E87.1 HYPONATREMIA: ICD-10-CM

## 2025-02-02 DIAGNOSIS — K74.60 DECOMPENSATION OF CIRRHOSIS OF LIVER (HCC): ICD-10-CM

## 2025-02-02 DIAGNOSIS — G89.4 CHRONIC PAIN SYNDROME: ICD-10-CM

## 2025-02-02 DIAGNOSIS — R06.89 HYPERCAPNEMIA: ICD-10-CM

## 2025-02-02 DIAGNOSIS — F41.9 ANXIETY: ICD-10-CM

## 2025-02-02 DIAGNOSIS — E87.5 HYPERKALEMIA: ICD-10-CM

## 2025-02-02 DIAGNOSIS — J96.22 ACUTE ON CHRONIC RESPIRATORY FAILURE WITH HYPOXIA AND HYPERCAPNIA: Primary | ICD-10-CM

## 2025-02-02 DIAGNOSIS — J96.21 ACUTE ON CHRONIC RESPIRATORY FAILURE WITH HYPOXIA AND HYPERCAPNIA: Primary | ICD-10-CM

## 2025-02-02 DIAGNOSIS — R18.8 ASCITES OF LIVER: ICD-10-CM

## 2025-02-02 DIAGNOSIS — K72.90 DECOMPENSATION OF CIRRHOSIS OF LIVER (HCC): ICD-10-CM

## 2025-02-02 LAB
ARTERIAL PATENCY WRIST A: NO
BASOPHILS # BLD: 0.01 K/UL
BASOPHILS NFR BLD: 0 % (ref 0–1)
BNP SERPL-MCNC: ABNORMAL PG/ML (ref 0–125)
BODY TEMPERATURE: 37
COHGB MFR BLD: 1.6 % (ref 0.5–1.5)
EOSINOPHIL # BLD: 0.04 K/UL
EOSINOPHILS RELATIVE PERCENT: 1 % (ref 0–3)
ERYTHROCYTE [DISTWIDTH] IN BLOOD BY AUTOMATED COUNT: 14.9 % (ref 11.7–14.9)
HCO3 VENOUS: 29.7 MMOL/L (ref 22–29)
HCT VFR BLD AUTO: 38.1 % (ref 42–52)
HGB BLD-MCNC: 11.4 G/DL (ref 13.5–18)
IMM GRANULOCYTES # BLD AUTO: 0.02 K/UL
IMM GRANULOCYTES NFR BLD: 0 %
LYMPHOCYTES NFR BLD: 0.41 K/UL
LYMPHOCYTES RELATIVE PERCENT: 9 % (ref 24–44)
MCH RBC QN AUTO: 28.5 PG (ref 27–31)
MCHC RBC AUTO-ENTMCNC: 29.9 G/DL (ref 32–36)
MCV RBC AUTO: 95.3 FL (ref 78–100)
METHEMOGLOBIN: 0.4 % (ref 0.5–1.5)
MONOCYTES NFR BLD: 0.18 K/UL
MONOCYTES NFR BLD: 4 % (ref 0–4)
NEGATIVE BASE EXCESS, VEN: 1.1 MMOL/L (ref 0–3)
NEUTROPHILS NFR BLD: 86 % (ref 36–66)
NEUTS SEG NFR BLD: 3.91 K/UL
OXYHGB MFR BLD: 69.4 %
PCO2 VENOUS: 84.9 MM HG (ref 38–54)
PH VENOUS: 7.16 (ref 7.32–7.43)
PLATELET # BLD AUTO: 163 K/UL (ref 140–440)
PMV BLD AUTO: 8.7 FL (ref 7.5–11.1)
PO2 VENOUS: 42.4 MM HG (ref 23–48)
RBC # BLD AUTO: 4 M/UL (ref 4.6–6.2)
TEXT FOR RESPIRATORY: ABNORMAL
TROPONIN I SERPL HS-MCNC: 558 NG/L (ref 0–22)
WBC OTHER # BLD: 4.6 K/UL (ref 4–10.5)

## 2025-02-02 PROCEDURE — 6360000002 HC RX W HCPCS: Performed by: EMERGENCY MEDICINE

## 2025-02-02 PROCEDURE — 99285 EMERGENCY DEPT VISIT HI MDM: CPT

## 2025-02-02 PROCEDURE — 84484 ASSAY OF TROPONIN QUANT: CPT

## 2025-02-02 PROCEDURE — 71045 X-RAY EXAM CHEST 1 VIEW: CPT

## 2025-02-02 PROCEDURE — 80053 COMPREHEN METABOLIC PANEL: CPT

## 2025-02-02 PROCEDURE — 85025 COMPLETE CBC W/AUTO DIFF WBC: CPT

## 2025-02-02 PROCEDURE — 82805 BLOOD GASES W/O2 SATURATION: CPT

## 2025-02-02 PROCEDURE — 6370000000 HC RX 637 (ALT 250 FOR IP): Performed by: EMERGENCY MEDICINE

## 2025-02-02 PROCEDURE — 83880 ASSAY OF NATRIURETIC PEPTIDE: CPT

## 2025-02-02 PROCEDURE — 2500000003 HC RX 250 WO HCPCS: Performed by: EMERGENCY MEDICINE

## 2025-02-02 PROCEDURE — 94640 AIRWAY INHALATION TREATMENT: CPT

## 2025-02-02 PROCEDURE — 96374 THER/PROPH/DIAG INJ IV PUSH: CPT

## 2025-02-02 PROCEDURE — 36415 COLL VENOUS BLD VENIPUNCTURE: CPT

## 2025-02-02 RX ORDER — FUROSEMIDE 10 MG/ML
20 INJECTION INTRAMUSCULAR; INTRAVENOUS ONCE
Status: DISCONTINUED | OUTPATIENT
Start: 2025-02-02 | End: 2025-02-03

## 2025-02-02 RX ORDER — OXYCODONE HYDROCHLORIDE 5 MG/1
5 TABLET ORAL ONCE
Status: COMPLETED | OUTPATIENT
Start: 2025-02-03 | End: 2025-02-03

## 2025-02-02 RX ORDER — IPRATROPIUM BROMIDE AND ALBUTEROL SULFATE 2.5; .5 MG/3ML; MG/3ML
2 SOLUTION RESPIRATORY (INHALATION) ONCE
Status: COMPLETED | OUTPATIENT
Start: 2025-02-02 | End: 2025-02-02

## 2025-02-02 RX ORDER — LORAZEPAM 0.5 MG/1
0.5 TABLET ORAL ONCE
Status: COMPLETED | OUTPATIENT
Start: 2025-02-02 | End: 2025-02-02

## 2025-02-02 RX ADMIN — IPRATROPIUM BROMIDE AND ALBUTEROL SULFATE 2 DOSE: 2.5; .5 SOLUTION RESPIRATORY (INHALATION) at 23:19

## 2025-02-02 RX ADMIN — WATER 125 MG: 1 INJECTION INTRAMUSCULAR; INTRAVENOUS; SUBCUTANEOUS at 22:46

## 2025-02-02 RX ADMIN — LORAZEPAM 0.5 MG: 0.5 TABLET ORAL at 22:47

## 2025-02-02 ASSESSMENT — PAIN - FUNCTIONAL ASSESSMENT: PAIN_FUNCTIONAL_ASSESSMENT: 0-10

## 2025-02-02 ASSESSMENT — PAIN SCALES - GENERAL: PAINLEVEL_OUTOF10: 0

## 2025-02-02 ASSESSMENT — PULMONARY FUNCTION TESTS: PIF_VALUE: 21

## 2025-02-03 LAB
ALBUMIN SERPL-MCNC: 2.3 G/DL (ref 3.4–5)
ALBUMIN/GLOB SERPL: 0.6 {RATIO} (ref 1.1–2.2)
ALP SERPL-CCNC: 122 U/L (ref 40–129)
ALT SERPL-CCNC: 11 U/L (ref 10–40)
ANION GAP SERPL CALCULATED.3IONS-SCNC: 5 MMOL/L (ref 9–17)
ANION GAP SERPL CALCULATED.3IONS-SCNC: 6 MMOL/L (ref 9–17)
ANION GAP SERPL CALCULATED.3IONS-SCNC: 7 MMOL/L (ref 9–17)
ANION GAP SERPL CALCULATED.3IONS-SCNC: 9 MMOL/L (ref 9–17)
APPEARANCE FLD: CLEAR
ARTERIAL PATENCY WRIST A: NO
AST SERPL-CCNC: 29 U/L (ref 15–37)
BILIRUB SERPL-MCNC: 0.3 MG/DL (ref 0–1)
BODY TEMPERATURE: 37
BUN SERPL-MCNC: 20 MG/DL (ref 7–20)
BUN SERPL-MCNC: 27 MG/DL (ref 7–20)
BUN SERPL-MCNC: 30 MG/DL (ref 7–20)
CALCIUM SERPL-MCNC: 8.7 MG/DL (ref 8.3–10.6)
CALCIUM SERPL-MCNC: 8.9 MG/DL (ref 8.3–10.6)
CALCIUM SERPL-MCNC: 9.3 MG/DL (ref 8.3–10.6)
CHLORIDE SERPL-SCNC: 91 MMOL/L (ref 99–110)
CHLORIDE SERPL-SCNC: 95 MMOL/L (ref 99–110)
CLOT CHECK: NORMAL
CO2 SERPL-SCNC: 24 MMOL/L (ref 21–32)
CO2 SERPL-SCNC: 25 MMOL/L (ref 21–32)
CO2 SERPL-SCNC: 28 MMOL/L (ref 21–32)
CO2 SERPL-SCNC: 31 MMOL/L (ref 21–32)
COHGB MFR BLD: 0.4 % (ref 0.5–1.5)
COHGB MFR BLD: 0.4 % (ref 0.5–1.5)
COHGB MFR BLD: 0.8 % (ref 0.5–1.5)
COLOR FLD: YELLOW
CREAT SERPL-MCNC: 1.8 MG/DL (ref 0.8–1.3)
CREAT SERPL-MCNC: 2.4 MG/DL (ref 0.8–1.3)
CREAT SERPL-MCNC: 2.5 MG/DL (ref 0.8–1.3)
GFR, ESTIMATED: 26 ML/MIN/1.73M2
GFR, ESTIMATED: 27 ML/MIN/1.73M2
GFR, ESTIMATED: 39 ML/MIN/1.73M2
GLUCOSE BLD-MCNC: 104 MG/DL (ref 74–99)
GLUCOSE BLD-MCNC: 149 MG/DL (ref 74–99)
GLUCOSE BLD-MCNC: 162 MG/DL (ref 74–99)
GLUCOSE FLD-MCNC: 106 MG/DL
GLUCOSE SERPL-MCNC: 102 MG/DL (ref 74–99)
GLUCOSE SERPL-MCNC: 161 MG/DL (ref 74–99)
GLUCOSE SERPL-MCNC: 96 MG/DL (ref 74–99)
HBV SURFACE AB SERPL IA-ACNC: 3.5 MIU/ML
HBV SURFACE AG SERPL QL IA: NONREACTIVE
HCO3 ARTERIAL: 28.4 MMOL/L (ref 21–28)
HCO3 VENOUS: 27.5 MMOL/L (ref 22–29)
HCO3 VENOUS: 29.1 MMOL/L (ref 22–29)
LACTATE BLDV-SCNC: 1 MMOL/L (ref 0.4–2)
METHEMOGLOBIN: 0 % (ref 0.5–1.5)
METHEMOGLOBIN: 0.4 % (ref 0.5–1.5)
METHEMOGLOBIN: 0.5 % (ref 0.5–1.5)
MONOCYTES NFR FLD: 87 %
NEGATIVE BASE EXCESS, VEN: 2.6 MMOL/L (ref 0–3)
NEUTROPHILS NFR FLD: 14 %
OXYHGB MFR BLD: 41.9 %
OXYHGB MFR BLD: 47.6 %
OXYHGB MFR BLD: 94.2 %
PCO2 ARTERIAL: 55.7 MMHG (ref 35–48)
PCO2 VENOUS: 67.8 MM HG (ref 38–54)
PCO2 VENOUS: 79.7 MM HG (ref 38–54)
PH ARTERIAL: 7.33 (ref 7.35–7.45)
PH VENOUS: 7.16 (ref 7.32–7.43)
PH VENOUS: 7.25 (ref 7.32–7.43)
PO2 ARTERIAL: 74.3 MMHG (ref 83–108)
PO2 VENOUS: 23.7 MM HG (ref 23–48)
PO2 VENOUS: ABNORMAL MM HG (ref 30–50)
POSITIVE BASE EXCESS, ART: 1.7 MMOL/L (ref 0–3)
POSITIVE BASE EXCESS, VEN: 0.8 MMOL/L (ref 0–3)
POTASSIUM SERPL-SCNC: 4.7 MMOL/L (ref 3.5–5.1)
POTASSIUM SERPL-SCNC: 6 MMOL/L (ref 3.5–5.1)
POTASSIUM SERPL-SCNC: 6.7 MMOL/L (ref 3.5–5.1)
POTASSIUM SERPL-SCNC: 6.8 MMOL/L (ref 3.5–5.1)
PROT FLD-MCNC: 2.7 G/DL
PROT SERPL-MCNC: 6.5 G/DL (ref 6.4–8.2)
RBC # FLD: <2000 CELLS/UL
SODIUM SERPL-SCNC: 121 MMOL/L (ref 136–145)
SODIUM SERPL-SCNC: 124 MMOL/L (ref 136–145)
SODIUM SERPL-SCNC: 125 MMOL/L (ref 136–145)
SODIUM SERPL-SCNC: 132 MMOL/L (ref 136–145)
TEXT FOR RESPIRATORY: ABNORMAL
TEXT FOR RESPIRATORY: ABNORMAL
WBC # FLD: 59 CELLS/UL

## 2025-02-03 PROCEDURE — 6360000002 HC RX W HCPCS: Performed by: INTERNAL MEDICINE

## 2025-02-03 PROCEDURE — 6360000002 HC RX W HCPCS: Performed by: EMERGENCY MEDICINE

## 2025-02-03 PROCEDURE — 96374 THER/PROPH/DIAG INJ IV PUSH: CPT

## 2025-02-03 PROCEDURE — 83605 ASSAY OF LACTIC ACID: CPT

## 2025-02-03 PROCEDURE — 6370000000 HC RX 637 (ALT 250 FOR IP): Performed by: EMERGENCY MEDICINE

## 2025-02-03 PROCEDURE — 87075 CULTR BACTERIA EXCEPT BLOOD: CPT

## 2025-02-03 PROCEDURE — 2500000003 HC RX 250 WO HCPCS: Performed by: INTERNAL MEDICINE

## 2025-02-03 PROCEDURE — 2500000003 HC RX 250 WO HCPCS: Performed by: PHYSICIAN ASSISTANT

## 2025-02-03 PROCEDURE — 36600 WITHDRAWAL OF ARTERIAL BLOOD: CPT

## 2025-02-03 PROCEDURE — 89051 BODY FLUID CELL COUNT: CPT

## 2025-02-03 PROCEDURE — 94640 AIRWAY INHALATION TREATMENT: CPT

## 2025-02-03 PROCEDURE — P9047 ALBUMIN (HUMAN), 25%, 50ML: HCPCS | Performed by: INTERNAL MEDICINE

## 2025-02-03 PROCEDURE — 6370000000 HC RX 637 (ALT 250 FOR IP): Performed by: INTERNAL MEDICINE

## 2025-02-03 PROCEDURE — 87070 CULTURE OTHR SPECIMN AEROBIC: CPT

## 2025-02-03 PROCEDURE — 6360000002 HC RX W HCPCS: Performed by: PHYSICIAN ASSISTANT

## 2025-02-03 PROCEDURE — 94002 VENT MGMT INPAT INIT DAY: CPT

## 2025-02-03 PROCEDURE — 82962 GLUCOSE BLOOD TEST: CPT

## 2025-02-03 PROCEDURE — 80051 ELECTROLYTE PANEL: CPT

## 2025-02-03 PROCEDURE — 87040 BLOOD CULTURE FOR BACTERIA: CPT

## 2025-02-03 PROCEDURE — 82805 BLOOD GASES W/O2 SATURATION: CPT

## 2025-02-03 PROCEDURE — 87340 HEPATITIS B SURFACE AG IA: CPT

## 2025-02-03 PROCEDURE — 89220 SPUTUM SPECIMEN COLLECTION: CPT

## 2025-02-03 PROCEDURE — 2500000003 HC RX 250 WO HCPCS

## 2025-02-03 PROCEDURE — 84157 ASSAY OF PROTEIN OTHER: CPT

## 2025-02-03 PROCEDURE — 94761 N-INVAS EAR/PLS OXIMETRY MLT: CPT

## 2025-02-03 PROCEDURE — 86317 IMMUNOASSAY INFECTIOUS AGENT: CPT

## 2025-02-03 PROCEDURE — 36415 COLL VENOUS BLD VENIPUNCTURE: CPT

## 2025-02-03 PROCEDURE — 3E033XZ INTRODUCTION OF VASOPRESSOR INTO PERIPHERAL VEIN, PERCUTANEOUS APPROACH: ICD-10-PCS | Performed by: INTERNAL MEDICINE

## 2025-02-03 PROCEDURE — 80048 BASIC METABOLIC PNL TOTAL CA: CPT

## 2025-02-03 PROCEDURE — 2700000000 HC OXYGEN THERAPY PER DAY

## 2025-02-03 PROCEDURE — 87205 SMEAR GRAM STAIN: CPT

## 2025-02-03 PROCEDURE — 2000000000 HC ICU R&B

## 2025-02-03 PROCEDURE — 2580000003 HC RX 258: Performed by: INTERNAL MEDICINE

## 2025-02-03 PROCEDURE — 5A1945Z RESPIRATORY VENTILATION, 24-96 CONSECUTIVE HOURS: ICD-10-PCS | Performed by: RADIOLOGY

## 2025-02-03 PROCEDURE — 93005 ELECTROCARDIOGRAM TRACING: CPT | Performed by: EMERGENCY MEDICINE

## 2025-02-03 PROCEDURE — 0BH17EZ INSERTION OF ENDOTRACHEAL AIRWAY INTO TRACHEA, VIA NATURAL OR ARTIFICIAL OPENING: ICD-10-PCS | Performed by: RADIOLOGY

## 2025-02-03 PROCEDURE — 82945 GLUCOSE OTHER FLUID: CPT

## 2025-02-03 RX ORDER — DEXTROSE MONOHYDRATE 100 MG/ML
INJECTION, SOLUTION INTRAVENOUS CONTINUOUS PRN
Status: DISCONTINUED | OUTPATIENT
Start: 2025-02-03 | End: 2025-02-07 | Stop reason: HOSPADM

## 2025-02-03 RX ORDER — NOREPINEPHRINE BITARTRATE 0.06 MG/ML
1-100 INJECTION, SOLUTION INTRAVENOUS CONTINUOUS
Status: DISCONTINUED | OUTPATIENT
Start: 2025-02-03 | End: 2025-02-03

## 2025-02-03 RX ORDER — IPRATROPIUM BROMIDE AND ALBUTEROL SULFATE 2.5; .5 MG/3ML; MG/3ML
1 SOLUTION RESPIRATORY (INHALATION)
Status: DISCONTINUED | OUTPATIENT
Start: 2025-02-03 | End: 2025-02-05

## 2025-02-03 RX ORDER — GLUCAGON 1 MG/ML
1 KIT INJECTION PRN
Status: DISCONTINUED | OUTPATIENT
Start: 2025-02-03 | End: 2025-02-07 | Stop reason: HOSPADM

## 2025-02-03 RX ORDER — ATORVASTATIN CALCIUM 40 MG/1
40 TABLET, FILM COATED ORAL NIGHTLY
Status: DISCONTINUED | OUTPATIENT
Start: 2025-02-03 | End: 2025-02-07 | Stop reason: HOSPADM

## 2025-02-03 RX ORDER — MIDODRINE HYDROCHLORIDE 5 MG/1
10 TABLET ORAL ONCE
Status: COMPLETED | OUTPATIENT
Start: 2025-02-03 | End: 2025-02-03

## 2025-02-03 RX ORDER — LORAZEPAM 2 MG/ML
1 INJECTION INTRAMUSCULAR EVERY 6 HOURS PRN
Status: DISCONTINUED | OUTPATIENT
Start: 2025-02-03 | End: 2025-02-05

## 2025-02-03 RX ORDER — LACTULOSE 10 G/15ML
20 SOLUTION ORAL 3 TIMES DAILY
Status: DISCONTINUED | OUTPATIENT
Start: 2025-02-03 | End: 2025-02-07 | Stop reason: HOSPADM

## 2025-02-03 RX ORDER — ALBUMIN (HUMAN) 12.5 G/50ML
12.5 SOLUTION INTRAVENOUS
Status: COMPLETED | OUTPATIENT
Start: 2025-02-03 | End: 2025-02-03

## 2025-02-03 RX ORDER — TRAZODONE HYDROCHLORIDE 50 MG/1
50 TABLET, FILM COATED ORAL NIGHTLY
Status: DISCONTINUED | OUTPATIENT
Start: 2025-02-03 | End: 2025-02-07 | Stop reason: HOSPADM

## 2025-02-03 RX ORDER — SODIUM CHLORIDE 0.9 % (FLUSH) 0.9 %
5-40 SYRINGE (ML) INJECTION PRN
Status: DISCONTINUED | OUTPATIENT
Start: 2025-02-03 | End: 2025-02-07 | Stop reason: HOSPADM

## 2025-02-03 RX ORDER — MIDODRINE HYDROCHLORIDE 5 MG/1
15 TABLET ORAL ONCE
Status: DISCONTINUED | OUTPATIENT
Start: 2025-02-03 | End: 2025-02-03

## 2025-02-03 RX ORDER — SODIUM CHLORIDE 9 MG/ML
INJECTION, SOLUTION INTRAVENOUS PRN
Status: DISCONTINUED | OUTPATIENT
Start: 2025-02-03 | End: 2025-02-07 | Stop reason: HOSPADM

## 2025-02-03 RX ORDER — GABAPENTIN 100 MG/1
100 CAPSULE ORAL DAILY
Status: DISCONTINUED | OUTPATIENT
Start: 2025-02-03 | End: 2025-02-07 | Stop reason: HOSPADM

## 2025-02-03 RX ORDER — MIDODRINE HYDROCHLORIDE 5 MG/1
15 TABLET ORAL EVERY 8 HOURS
Status: DISCONTINUED | OUTPATIENT
Start: 2025-02-03 | End: 2025-02-07 | Stop reason: HOSPADM

## 2025-02-03 RX ORDER — CALCIUM GLUCONATE 20 MG/ML
1000 INJECTION, SOLUTION INTRAVENOUS ONCE
Status: COMPLETED | OUTPATIENT
Start: 2025-02-03 | End: 2025-02-03

## 2025-02-03 RX ORDER — FENTANYL CITRATE 50 UG/ML
25 INJECTION, SOLUTION INTRAMUSCULAR; INTRAVENOUS
Status: DISCONTINUED | OUTPATIENT
Start: 2025-02-03 | End: 2025-02-04

## 2025-02-03 RX ORDER — HEPARIN SODIUM 5000 [USP'U]/ML
5000 INJECTION, SOLUTION INTRAVENOUS; SUBCUTANEOUS EVERY 8 HOURS SCHEDULED
Status: DISCONTINUED | OUTPATIENT
Start: 2025-02-03 | End: 2025-02-07 | Stop reason: HOSPADM

## 2025-02-03 RX ORDER — POLYETHYLENE GLYCOL 3350 17 G/17G
17 POWDER, FOR SOLUTION ORAL DAILY PRN
Status: DISCONTINUED | OUTPATIENT
Start: 2025-02-03 | End: 2025-02-07 | Stop reason: HOSPADM

## 2025-02-03 RX ORDER — ALBUMIN (HUMAN) 12.5 G/50ML
25 SOLUTION INTRAVENOUS ONCE
Status: COMPLETED | OUTPATIENT
Start: 2025-02-03 | End: 2025-02-03

## 2025-02-03 RX ORDER — ONDANSETRON 2 MG/ML
4 INJECTION INTRAMUSCULAR; INTRAVENOUS EVERY 6 HOURS PRN
Status: DISCONTINUED | OUTPATIENT
Start: 2025-02-03 | End: 2025-02-07 | Stop reason: HOSPADM

## 2025-02-03 RX ORDER — ONDANSETRON 4 MG/1
4 TABLET, ORALLY DISINTEGRATING ORAL EVERY 8 HOURS PRN
Status: DISCONTINUED | OUTPATIENT
Start: 2025-02-03 | End: 2025-02-07 | Stop reason: HOSPADM

## 2025-02-03 RX ORDER — SUCRALFATE 1 G/1
1 TABLET ORAL EVERY 8 HOURS SCHEDULED
Status: DISCONTINUED | OUTPATIENT
Start: 2025-02-03 | End: 2025-02-07 | Stop reason: HOSPADM

## 2025-02-03 RX ORDER — SODIUM CHLORIDE 0.9 % (FLUSH) 0.9 %
5-40 SYRINGE (ML) INJECTION EVERY 12 HOURS SCHEDULED
Status: DISCONTINUED | OUTPATIENT
Start: 2025-02-03 | End: 2025-02-07 | Stop reason: HOSPADM

## 2025-02-03 RX ORDER — ALBUTEROL SULFATE 0.83 MG/ML
15 SOLUTION RESPIRATORY (INHALATION) ONCE
Status: DISCONTINUED | OUTPATIENT
Start: 2025-02-03 | End: 2025-02-03

## 2025-02-03 RX ORDER — NOREPINEPHRINE BITARTRATE 0.06 MG/ML
1-100 INJECTION, SOLUTION INTRAVENOUS CONTINUOUS
Status: DISCONTINUED | OUTPATIENT
Start: 2025-02-03 | End: 2025-02-04

## 2025-02-03 RX ADMIN — SUCRALFATE 1 G: 1 TABLET ORAL at 06:13

## 2025-02-03 RX ADMIN — FENTANYL CITRATE 25 MCG: 50 INJECTION, SOLUTION INTRAMUSCULAR; INTRAVENOUS at 14:49

## 2025-02-03 RX ADMIN — ONDANSETRON 4 MG: 2 INJECTION INTRAMUSCULAR; INTRAVENOUS at 02:35

## 2025-02-03 RX ADMIN — FENTANYL CITRATE 25 MCG: 50 INJECTION, SOLUTION INTRAMUSCULAR; INTRAVENOUS at 18:35

## 2025-02-03 RX ADMIN — HEPARIN SODIUM 5000 UNITS: 5000 INJECTION INTRAVENOUS; SUBCUTANEOUS at 06:13

## 2025-02-03 RX ADMIN — IPRATROPIUM BROMIDE AND ALBUTEROL SULFATE 1 DOSE: 2.5; .5 SOLUTION RESPIRATORY (INHALATION) at 12:05

## 2025-02-03 RX ADMIN — RIFAXIMIN 550 MG: 550 TABLET ORAL at 23:05

## 2025-02-03 RX ADMIN — MIDODRINE HYDROCHLORIDE 15 MG: 5 TABLET ORAL at 23:05

## 2025-02-03 RX ADMIN — INSULIN HUMAN 10 UNITS: 100 INJECTION, SOLUTION PARENTERAL at 04:29

## 2025-02-03 RX ADMIN — IPRATROPIUM BROMIDE AND ALBUTEROL SULFATE 1 DOSE: 2.5; .5 SOLUTION RESPIRATORY (INHALATION) at 16:29

## 2025-02-03 RX ADMIN — ALBUMIN (HUMAN) 25 G: 0.25 INJECTION, SOLUTION INTRAVENOUS at 06:35

## 2025-02-03 RX ADMIN — CALCIUM GLUCONATE 1000 MG: 20 INJECTION, SOLUTION INTRAVENOUS at 01:31

## 2025-02-03 RX ADMIN — LACTULOSE 20 G: 20 SOLUTION ORAL at 23:06

## 2025-02-03 RX ADMIN — NOREPINEPHRINE BITARTRATE 5 MCG/MIN: 0.06 INJECTION, SOLUTION INTRAVENOUS at 07:08

## 2025-02-03 RX ADMIN — DEXTROSE MONOHYDRATE 250 ML: 100 INJECTION, SOLUTION INTRAVENOUS at 04:30

## 2025-02-03 RX ADMIN — LORAZEPAM 1 MG: 2 INJECTION INTRAMUSCULAR; INTRAVENOUS at 18:23

## 2025-02-03 RX ADMIN — HEPARIN SODIUM 5000 UNITS: 5000 INJECTION INTRAVENOUS; SUBCUTANEOUS at 14:39

## 2025-02-03 RX ADMIN — ALBUMIN (HUMAN) 25 G: 0.25 INJECTION, SOLUTION INTRAVENOUS at 04:25

## 2025-02-03 RX ADMIN — SODIUM POLYSTYRENE SULFONATE 15 G: 15 SUSPENSION ORAL; RECTAL at 01:28

## 2025-02-03 RX ADMIN — SUCRALFATE 1 G: 1 TABLET ORAL at 23:05

## 2025-02-03 RX ADMIN — FENTANYL CITRATE 25 MCG: 50 INJECTION, SOLUTION INTRAMUSCULAR; INTRAVENOUS at 12:57

## 2025-02-03 RX ADMIN — MIDODRINE HYDROCHLORIDE 15 MG: 5 TABLET ORAL at 06:10

## 2025-02-03 RX ADMIN — HEPARIN SODIUM 5000 UNITS: 5000 INJECTION INTRAVENOUS; SUBCUTANEOUS at 23:06

## 2025-02-03 RX ADMIN — NOREPINEPHRINE BITARTRATE 7 MCG/MIN: 0.06 INJECTION, SOLUTION INTRAVENOUS at 12:26

## 2025-02-03 RX ADMIN — ATORVASTATIN CALCIUM 40 MG: 40 TABLET, FILM COATED ORAL at 23:05

## 2025-02-03 RX ADMIN — SODIUM BICARBONATE 50 MEQ: 84 INJECTION, SOLUTION INTRAVENOUS at 02:44

## 2025-02-03 RX ADMIN — SODIUM CHLORIDE, PRESERVATIVE FREE 10 ML: 5 INJECTION INTRAVENOUS at 10:55

## 2025-02-03 RX ADMIN — IPRATROPIUM BROMIDE AND ALBUTEROL SULFATE 1 DOSE: 2.5; .5 SOLUTION RESPIRATORY (INHALATION) at 20:34

## 2025-02-03 RX ADMIN — OXYCODONE HYDROCHLORIDE 5 MG: 5 TABLET ORAL at 00:34

## 2025-02-03 RX ADMIN — SODIUM CHLORIDE, PRESERVATIVE FREE 10 ML: 5 INJECTION INTRAVENOUS at 23:06

## 2025-02-03 RX ADMIN — FENTANYL CITRATE 25 MCG: 50 INJECTION, SOLUTION INTRAMUSCULAR; INTRAVENOUS at 23:25

## 2025-02-03 RX ADMIN — Medication 50 MEQ: at 02:44

## 2025-02-03 RX ADMIN — MIDODRINE HYDROCHLORIDE 10 MG: 5 TABLET ORAL at 00:34

## 2025-02-03 RX ADMIN — TRAZODONE HYDROCHLORIDE 50 MG: 50 TABLET ORAL at 23:05

## 2025-02-03 RX ADMIN — ALBUMIN (HUMAN) 12.5 G: 0.25 INJECTION, SOLUTION INTRAVENOUS at 08:20

## 2025-02-03 ASSESSMENT — PAIN SCALES - GENERAL
PAINLEVEL_OUTOF10: 0
PAINLEVEL_OUTOF10: 0
PAINLEVEL_OUTOF10: 5
PAINLEVEL_OUTOF10: 4
PAINLEVEL_OUTOF10: 9

## 2025-02-03 ASSESSMENT — ENCOUNTER SYMPTOMS
SORE THROAT: 0
COUGH: 0
SINUS PRESSURE: 0
NAUSEA: 1
SHORTNESS OF BREATH: 1
ABDOMINAL PAIN: 1
ABDOMINAL DISTENTION: 1
VOMITING: 1

## 2025-02-03 ASSESSMENT — PULMONARY FUNCTION TESTS
PIF_VALUE: 33
PIF_VALUE: 30
PIF_VALUE: 26
PIF_VALUE: 35
PIF_VALUE: 21
PIF_VALUE: 34
PIF_VALUE: 17

## 2025-02-03 ASSESSMENT — PAIN DESCRIPTION - DESCRIPTORS: DESCRIPTORS: ACHING

## 2025-02-03 ASSESSMENT — PAIN DESCRIPTION - LOCATION: LOCATION: BACK

## 2025-02-03 ASSESSMENT — PAIN DESCRIPTION - ONSET: ONSET: ON-GOING

## 2025-02-03 ASSESSMENT — PAIN DESCRIPTION - FREQUENCY: FREQUENCY: CONTINUOUS

## 2025-02-03 ASSESSMENT — PAIN - FUNCTIONAL ASSESSMENT: PAIN_FUNCTIONAL_ASSESSMENT: ACTIVITIES ARE NOT PREVENTED

## 2025-02-03 ASSESSMENT — PAIN SCALES - WONG BAKER: WONGBAKER_NUMERICALRESPONSE: NO HURT

## 2025-02-03 ASSESSMENT — PAIN DESCRIPTION - ORIENTATION: ORIENTATION: LOWER;MID

## 2025-02-03 NOTE — DISCHARGE INSTR - COC
certify the above information and transfer of Tico Day  is necessary for the continuing treatment of the diagnosis listed and that he requires Skilled Nursing Facility for greater 30 days.     Update Admission H&P: No change in H&P    PHYSICIAN SIGNATURE:  Electronically signed by Edwin Yung MD on 2/7/25 at 2:42 PM EST

## 2025-02-03 NOTE — ED PROVIDER NOTES
Emergency Department Encounter  [facility] EMERGENCY DEPARTMENT    Patient: Tico Day  MRN: [id number]  : 1960  Date of Evaluation: 2025  ED Supervising Physician: Keven Carcamo MD    I independently examined and evaluated Tico Day.    In brief, Tico Day is a 64 y.o. male that presents to the emergency department with difficulty breathing from nursing facility.  Patient with history of chronic respiratory failure status post tracheostomy.  He is usually on heated high flow.  Also with history of decompensated cirrhosis and has had a paracentesis in the past.  He has had increasing swelling in his abdomen with difficulty breathing.  No reported fever.    Focused exam: No acute distress.  Slightly labored respirations.  Coarse lung sounds bilaterally.  Abdomen is distended but soft.    Brief ED course/MDM: Patient presents as above with mild respiratory distress with ascites but has not hypoxic.  VBG is indicating hypercapnia.  Suspect hypoventilation from increasing abdominal pressure.  Patient was almost supine when I saw him and he was repositioned to 30 to 45 degrees.  Respiratory therapy called for assisted ventilations on BiPAP settings.    Patient's uncuffed trach was changed to a cuffed 7.5 Shiley so patient can be put on a vent for hypercapnic respiratory failure.  This is likely secondary to hypervolemia.  However, patient cannot be aggressively diuresed secondary to soft blood pressures.  He was restarted on home dose of midodrine.  He otherwise remained hemodynamically stable and was tolerating vent settings well.  Blood work significant for hyperkalemia although this was moderately hemolyzed.  He was given calcium, Kayexalate and albuterol.  Hyponatremia likely secondary to hypervolemic state.      EKG: Normal sinus rhythm with bifascicular block.  No specific ST or T wave changes.  Age-indeterminate lateral infarct.  QTc normal    I directly delivered

## 2025-02-03 NOTE — CARE COORDINATION
Chart reviewed. Pt known to this Cm from his hospitalization in Dec 2024. Prior to this admisson per record pt was trach dependent and was on ? 02 by trach mask but it at this time requiring vent settings. Pt also is on dialysis at [facility].  Pt's home is in [place] and pt is at [facility] as it was the closet SNF with ventilator care and dialysis.CM will follow up with pt/spouse as needed for discharge planning.   02/03/25 0801   Service Assessment   Patient Orientation Alert and Oriented;Person;Other (see comment)  (Pt trach dependent at SNF. Currently on vent)   Cognition Alert   History Provided By Medical Record   Primary Caregiver Other (Comment)  (ECF--[facility])   Accompanied By/Relationship N/A   Support Systems Spouse/Significant Other   Patient's Healthcare Decision Maker is: Legal Next of Kin   PCP Verified by CM Yes  ([facility] medical director/NP provide routine care)   Last Visit to PCP Within last 3 months   Prior Functional Level Assistance with the following:;Bathing;Toileting;Mobility;Dressing   Current Functional Level Assistance with the following:;Other (see comment)  (requires total care at present)   Ability to make needs known: Poor   Family able to assist with home care needs: No   Would you like for me to discuss the discharge plan with any other family members/significant others, and if so, who? Yes  (LNOK as needed)   Financial Resources Medicare   Community Resources ECF/Home Care  ([facility])   CM/SW Referral Other (see comment)  (discharge planning assessment and coordination of return to SNF at discharge.)

## 2025-02-03 NOTE — H&P
[facility] Critical Care Consult Note      Name:  Tico Day /Age/Sex: 1960  (64 y.o. male)   MRN & CSN:  [id number] & [id number] Encounter Date/Time: 2/3/2025 2:27 AM EST   Location:  ED16/ED-16 PCP: Carlos Nina MD     Attending:Gabriele Nobles Jr., *  Consulting Provider: Marge Hernandez MD      Hospital Day: 2    History Obtained From:    patient, electronic medical record    History of Present Illness:     Chief Complaint: Hyperkalemia    Tico Day is a 64-year-old male with a complicated past medical history significant for cirrhosis, heart failure reduced EF with an ejection fraction of 40%, chronic respiratory failure on chronic ventilator, coronary artery disease status post PCI in , ESRD on hemodialysis  who presented to the ER today with worsening abdominal distention, shortness of breath.  Today patient was hypoxic in the 80s on trach collar, staff attempted to place patient back on the ventilator and the began bagging the patient which improved his oxygen saturations.   The patient's uncuffed trach was exchanged to a 7.5 cuffed trach.  Patient was placed on mechanical ventilation with improvement in patient's symptoms.  Patient was seen upon arrival to ICU and patient was on ventilator and vomiting. Stating that he needed drainage of his abdomen.     Patient underwent IR guided paracentesis 2025 of 14 L (with 87.5 g of albumin given)  Labs and radiological studies reviewed independently. Case discussed with KAI Neri, Agree with transfer to ICU for further evaluation and care.   Review of Systems:    Review of Systems   Constitutional:  Negative for chills, fatigue and fever.   HENT:  Negative for sinus pressure, sneezing and sore throat.    Respiratory:  Positive for shortness of breath. Negative for cough.    Gastrointestinal:  Positive for abdominal distention, abdominal pain, nausea and vomiting.   Genitourinary:  Positive for decreased

## 2025-02-03 NOTE — ED PROVIDER NOTES
Triage Chief Complaint:   Shortness of Breath    [place]:  Today in the ED I had the pleasure of caring for Tico Day who is a 64 y.o. male with a hx of hemodialysis-Monday Wednesday and Friday, chronic systolic heart failure, chronic respiratory failure with tracheostomy since 7/24  that presents today to the ED for evaluation for sob. Apt has hx of chronic respiratory failure. Chronic trachostomy and vent dependent. Pt was taken off of the vent  unknown reason. When nursing staff put him back on the vent he was hypoxic in the 80s. Staff then resurted to bagging pt which improved is oxygen saturation. On arrival of ems he was at 95%. They continued to bag and brought patient here. Pt deneis pain. He does request suction.     Ems suctioned pt withotu significant return.     ROS:  REVIEW OF SYSTEMS    At least 10 systems reviewed      All other review of systems are negative  See HPI and nursing notes for additional information       Past Medical History:   Diagnosis Date    Acute and chronic respiratory failure with hypoxia     Anxiety     COPD (chronic obstructive pulmonary disease) (HCC)     DM type 2 (diabetes mellitus, type 2) (HCC)     Dysphagia, oropharyngeal     End stage renal disease (HCC)     Malnutrition of moderate degree (HCC)     Morbid obesity     Sleep apnea     Ventilator dependent (HCC)      Past Surgical History:   Procedure Laterality Date    IR TUNNELED CVC PLACE WO SQ PORT/PUMP > 5 YEARS  11/6/2024    IR TUNNELED CATHETER PLACEMENT GREATER THAN 5 YEARS [facility]     History reviewed. No pertinent family history.  Social History     Socioeconomic History    Marital status:      Spouse name: Not on file    Number of children: Not on file    Years of education: Not on file    Highest education level: Not on file   Occupational History    Not on file   Tobacco Use    Smoking status: Former     Current packs/day: 0.50     Average packs/day: 0.5 packs/day for 32.9

## 2025-02-03 NOTE — ED TRIAGE NOTES
Per EMS patient is on humidified air at nursing home, nursing home took him off but couldn't get him back on it, patient has been bagged for last hour. Patient suctioned 2x in route. Per EMS nursing home did not say way they took him off of it.

## 2025-02-03 NOTE — PROCEDURES
PROCEDURE NOTE  Date: 2/3/2025   Name: Tico Day  YOB: 1960    Paracentesis    Date/Time: 2/3/2025 4:19 AM    Performed by: Marge Hernandez MD  Authorized by: Marge Hernandez MD  Consent: Verbal consent obtained. Written consent obtained.  Risks and benefits: risks, benefits and alternatives were discussed  Consent given by: patient  Patient understanding: patient states understanding of the procedure being performed  Patient consent: the patient's understanding of the procedure matches consent given  Procedure consent: procedure consent matches procedure scheduled  Relevant documents: relevant documents present and verified  Test results: test results available and properly labeled  Site marked: the operative site was marked  Imaging studies: imaging studies available  Patient identity confirmed: arm band, provided demographic data, verbally with patient and hospital-assigned identification number  Time out: Immediately prior to procedure a \"time out\" was called to verify the correct patient, procedure, equipment, support staff and site/side marked as required.  Procedure purpose: therapeutic  Indications: abdominal discomfort secondary to ascites and respiratory distress secondary to ascites  Anesthesia: local infiltration    Anesthesia:  Local Anesthetic: lidocaine 1% without epinephrine  Anesthetic total (ml): 3 mL.    Sedation:  Patient sedated: no    Preparation: Patient was prepped and draped in the usual sterile fashion.  Needle gauge: 5Fr catheter over needle.  Ultrasound guidance: yes  Puncture site: right lower quadrant  Aspirate amount (ml): 4500mL.  Fluid appearance: serous  Dressing: 4x4 sterile gauze  Patient tolerance: patient tolerated the procedure well with no immediate complications

## 2025-02-04 PROBLEM — E44.0 MODERATE MALNUTRITION (HCC): Chronic | Status: ACTIVE | Noted: 2024-10-27

## 2025-02-04 LAB
ANION GAP SERPL CALCULATED.3IONS-SCNC: 8 MMOL/L (ref 9–17)
BASOPHILS # BLD: 0 K/UL
BASOPHILS NFR BLD: 0 % (ref 0–1)
BUN SERPL-MCNC: 24 MG/DL (ref 7–20)
CALCIUM SERPL-MCNC: 8.9 MG/DL (ref 8.3–10.6)
CHLORIDE SERPL-SCNC: 96 MMOL/L (ref 99–110)
CO2 SERPL-SCNC: 30 MMOL/L (ref 21–32)
CREAT SERPL-MCNC: 2.2 MG/DL (ref 0.8–1.3)
EKG ATRIAL RATE: 71 BPM
EKG DIAGNOSIS: NORMAL
EKG P AXIS: 36 DEGREES
EKG P-R INTERVAL: 184 MS
EKG Q-T INTERVAL: 394 MS
EKG QRS DURATION: 146 MS
EKG QTC CALCULATION (BAZETT): 428 MS
EKG R AXIS: -78 DEGREES
EKG T AXIS: 62 DEGREES
EKG VENTRICULAR RATE: 71 BPM
EOSINOPHIL # BLD: 0.01 K/UL
EOSINOPHILS RELATIVE PERCENT: 0 % (ref 0–3)
ERYTHROCYTE [DISTWIDTH] IN BLOOD BY AUTOMATED COUNT: 15.4 % (ref 11.7–14.9)
GFR, ESTIMATED: 30 ML/MIN/1.73M2
GLUCOSE SERPL-MCNC: 89 MG/DL (ref 74–99)
HCT VFR BLD AUTO: 24.7 % (ref 42–52)
HGB BLD-MCNC: 7.5 G/DL (ref 13.5–18)
IMM GRANULOCYTES # BLD AUTO: 0.02 K/UL
IMM GRANULOCYTES NFR BLD: 0 %
LYMPHOCYTES NFR BLD: 0.62 K/UL
LYMPHOCYTES RELATIVE PERCENT: 10 % (ref 24–44)
MCH RBC QN AUTO: 28.1 PG (ref 27–31)
MCHC RBC AUTO-ENTMCNC: 30.4 G/DL (ref 32–36)
MCV RBC AUTO: 92.5 FL (ref 78–100)
MONOCYTES NFR BLD: 0.42 K/UL
MONOCYTES NFR BLD: 7 % (ref 0–4)
NEUTROPHILS NFR BLD: 83 % (ref 36–66)
NEUTS SEG NFR BLD: 5.21 K/UL
PLATELET # BLD AUTO: 150 K/UL (ref 140–440)
PMV BLD AUTO: 9.5 FL (ref 7.5–11.1)
POTASSIUM SERPL-SCNC: 4.4 MMOL/L (ref 3.5–5.1)
RBC # BLD AUTO: 2.67 M/UL (ref 4.6–6.2)
SODIUM SERPL-SCNC: 134 MMOL/L (ref 136–145)
WBC OTHER # BLD: 6.3 K/UL (ref 4–10.5)

## 2025-02-04 PROCEDURE — 2000000000 HC ICU R&B

## 2025-02-04 PROCEDURE — 85025 COMPLETE CBC W/AUTO DIFF WBC: CPT

## 2025-02-04 PROCEDURE — 86850 RBC ANTIBODY SCREEN: CPT

## 2025-02-04 PROCEDURE — 80048 BASIC METABOLIC PNL TOTAL CA: CPT

## 2025-02-04 PROCEDURE — 93010 ELECTROCARDIOGRAM REPORT: CPT | Performed by: INTERNAL MEDICINE

## 2025-02-04 PROCEDURE — 94003 VENT MGMT INPAT SUBQ DAY: CPT

## 2025-02-04 PROCEDURE — 86900 BLOOD TYPING SEROLOGIC ABO: CPT

## 2025-02-04 PROCEDURE — 86923 COMPATIBILITY TEST ELECTRIC: CPT

## 2025-02-04 PROCEDURE — 89220 SPUTUM SPECIMEN COLLECTION: CPT

## 2025-02-04 PROCEDURE — 6370000000 HC RX 637 (ALT 250 FOR IP): Performed by: INTERNAL MEDICINE

## 2025-02-04 PROCEDURE — 94640 AIRWAY INHALATION TREATMENT: CPT

## 2025-02-04 PROCEDURE — 2500000003 HC RX 250 WO HCPCS: Performed by: INTERNAL MEDICINE

## 2025-02-04 PROCEDURE — 2700000000 HC OXYGEN THERAPY PER DAY

## 2025-02-04 PROCEDURE — P9047 ALBUMIN (HUMAN), 25%, 50ML: HCPCS | Performed by: INTERNAL MEDICINE

## 2025-02-04 PROCEDURE — 6360000002 HC RX W HCPCS: Performed by: PHYSICIAN ASSISTANT

## 2025-02-04 PROCEDURE — 6360000002 HC RX W HCPCS: Performed by: INTERNAL MEDICINE

## 2025-02-04 PROCEDURE — 86901 BLOOD TYPING SEROLOGIC RH(D): CPT

## 2025-02-04 PROCEDURE — 94761 N-INVAS EAR/PLS OXIMETRY MLT: CPT

## 2025-02-04 RX ORDER — ALBUMIN (HUMAN) 12.5 G/50ML
25 SOLUTION INTRAVENOUS ONCE
Status: COMPLETED | OUTPATIENT
Start: 2025-02-04 | End: 2025-02-04

## 2025-02-04 RX ORDER — MORPHINE SULFATE 2 MG/ML
2 INJECTION, SOLUTION INTRAMUSCULAR; INTRAVENOUS EVERY 4 HOURS PRN
Status: DISCONTINUED | OUTPATIENT
Start: 2025-02-04 | End: 2025-02-05

## 2025-02-04 RX ORDER — SODIUM CHLORIDE 9 MG/ML
INJECTION, SOLUTION INTRAVENOUS PRN
Status: DISCONTINUED | OUTPATIENT
Start: 2025-02-04 | End: 2025-02-07 | Stop reason: HOSPADM

## 2025-02-04 RX ADMIN — GABAPENTIN 100 MG: 100 CAPSULE ORAL at 08:51

## 2025-02-04 RX ADMIN — SUCRALFATE 1 G: 1 TABLET ORAL at 05:46

## 2025-02-04 RX ADMIN — IPRATROPIUM BROMIDE AND ALBUTEROL SULFATE 1 DOSE: 2.5; .5 SOLUTION RESPIRATORY (INHALATION) at 19:37

## 2025-02-04 RX ADMIN — FENTANYL CITRATE 25 MCG: 50 INJECTION, SOLUTION INTRAMUSCULAR; INTRAVENOUS at 12:24

## 2025-02-04 RX ADMIN — HEPARIN SODIUM 5000 UNITS: 5000 INJECTION INTRAVENOUS; SUBCUTANEOUS at 15:55

## 2025-02-04 RX ADMIN — MORPHINE SULFATE 2 MG: 2 INJECTION, SOLUTION INTRAMUSCULAR; INTRAVENOUS at 21:00

## 2025-02-04 RX ADMIN — LORAZEPAM 1 MG: 2 INJECTION INTRAMUSCULAR; INTRAVENOUS at 03:05

## 2025-02-04 RX ADMIN — LORAZEPAM 1 MG: 2 INJECTION INTRAMUSCULAR; INTRAVENOUS at 23:55

## 2025-02-04 RX ADMIN — RIFAXIMIN 550 MG: 550 TABLET ORAL at 08:51

## 2025-02-04 RX ADMIN — IPRATROPIUM BROMIDE AND ALBUTEROL SULFATE 1 DOSE: 2.5; .5 SOLUTION RESPIRATORY (INHALATION) at 16:03

## 2025-02-04 RX ADMIN — SODIUM CHLORIDE, PRESERVATIVE FREE 10 ML: 5 INJECTION INTRAVENOUS at 21:00

## 2025-02-04 RX ADMIN — LORAZEPAM 1 MG: 2 INJECTION INTRAMUSCULAR; INTRAVENOUS at 09:11

## 2025-02-04 RX ADMIN — LACTULOSE 20 G: 20 SOLUTION ORAL at 15:55

## 2025-02-04 RX ADMIN — HEPARIN SODIUM 5000 UNITS: 5000 INJECTION INTRAVENOUS; SUBCUTANEOUS at 05:46

## 2025-02-04 RX ADMIN — IPRATROPIUM BROMIDE AND ALBUTEROL SULFATE 1 DOSE: 2.5; .5 SOLUTION RESPIRATORY (INHALATION) at 12:14

## 2025-02-04 RX ADMIN — IPRATROPIUM BROMIDE AND ALBUTEROL SULFATE 1 DOSE: 2.5; .5 SOLUTION RESPIRATORY (INHALATION) at 08:15

## 2025-02-04 RX ADMIN — MIDODRINE HYDROCHLORIDE 15 MG: 5 TABLET ORAL at 05:46

## 2025-02-04 RX ADMIN — ALBUMIN (HUMAN) 25 G: 0.25 INJECTION, SOLUTION INTRAVENOUS at 04:23

## 2025-02-04 RX ADMIN — MIDODRINE HYDROCHLORIDE 15 MG: 5 TABLET ORAL at 15:54

## 2025-02-04 RX ADMIN — LACTULOSE 20 G: 20 SOLUTION ORAL at 08:51

## 2025-02-04 RX ADMIN — SUCRALFATE 1 G: 1 TABLET ORAL at 15:54

## 2025-02-04 RX ADMIN — HEPARIN SODIUM 5000 UNITS: 5000 INJECTION INTRAVENOUS; SUBCUTANEOUS at 21:00

## 2025-02-04 RX ADMIN — FENTANYL CITRATE 25 MCG: 50 INJECTION, SOLUTION INTRAMUSCULAR; INTRAVENOUS at 18:50

## 2025-02-04 RX ADMIN — FENTANYL CITRATE 25 MCG: 50 INJECTION, SOLUTION INTRAMUSCULAR; INTRAVENOUS at 08:03

## 2025-02-04 RX ADMIN — LORAZEPAM 1 MG: 2 INJECTION INTRAMUSCULAR; INTRAVENOUS at 15:54

## 2025-02-04 ASSESSMENT — PAIN DESCRIPTION - LOCATION
LOCATION: BACK

## 2025-02-04 ASSESSMENT — PAIN DESCRIPTION - ONSET
ONSET: ON-GOING

## 2025-02-04 ASSESSMENT — PULMONARY FUNCTION TESTS
PIF_VALUE: 16
PIF_VALUE: 16
PIF_VALUE: 8
PIF_VALUE: 18
PIF_VALUE: 16
PIF_VALUE: 18
PIF_VALUE: 18
PIF_VALUE: 16
PIF_VALUE: 16
PIF_VALUE: 20
PIF_VALUE: 16
PIF_VALUE: 16

## 2025-02-04 ASSESSMENT — PAIN SCALES - GENERAL
PAINLEVEL_OUTOF10: 9
PAINLEVEL_OUTOF10: 9
PAINLEVEL_OUTOF10: 6
PAINLEVEL_OUTOF10: 9
PAINLEVEL_OUTOF10: 0
PAINLEVEL_OUTOF10: 9
PAINLEVEL_OUTOF10: 9
PAINLEVEL_OUTOF10: 8
PAINLEVEL_OUTOF10: 9
PAINLEVEL_OUTOF10: 0

## 2025-02-04 ASSESSMENT — PAIN DESCRIPTION - ORIENTATION
ORIENTATION: MID;LOWER

## 2025-02-04 ASSESSMENT — PAIN DESCRIPTION - FREQUENCY
FREQUENCY: CONTINUOUS

## 2025-02-04 ASSESSMENT — PAIN - FUNCTIONAL ASSESSMENT
PAIN_FUNCTIONAL_ASSESSMENT: ACTIVITIES ARE NOT PREVENTED

## 2025-02-04 ASSESSMENT — PAIN DESCRIPTION - DESCRIPTORS
DESCRIPTORS: OTHER (COMMENT)
DESCRIPTORS: OTHER (COMMENT)

## 2025-02-04 NOTE — CARE COORDINATION
Spoke to Marisa SEE. Patient is now on trach mask O2. Will follow for medical stability to return to [facility]. Candy Valles RN

## 2025-02-05 LAB
ANION GAP SERPL CALCULATED.3IONS-SCNC: 7 MMOL/L (ref 9–17)
BUN SERPL-MCNC: 13 MG/DL (ref 7–20)
CALCIUM SERPL-MCNC: 8.5 MG/DL (ref 8.3–10.6)
CHLORIDE SERPL-SCNC: 97 MMOL/L (ref 99–110)
CO2 SERPL-SCNC: 31 MMOL/L (ref 21–32)
CREAT SERPL-MCNC: 1.4 MG/DL (ref 0.8–1.3)
GFR, ESTIMATED: 52 ML/MIN/1.73M2
GLUCOSE SERPL-MCNC: 73 MG/DL (ref 74–99)
MAGNESIUM SERPL-MCNC: 1.9 MG/DL (ref 1.8–2.4)
POTASSIUM SERPL-SCNC: 3.4 MMOL/L (ref 3.5–5.1)
SODIUM SERPL-SCNC: 135 MMOL/L (ref 136–145)

## 2025-02-05 PROCEDURE — 6360000002 HC RX W HCPCS: Performed by: INTERNAL MEDICINE

## 2025-02-05 PROCEDURE — 94761 N-INVAS EAR/PLS OXIMETRY MLT: CPT

## 2025-02-05 PROCEDURE — 6370000000 HC RX 637 (ALT 250 FOR IP): Performed by: INTERNAL MEDICINE

## 2025-02-05 PROCEDURE — 6370000000 HC RX 637 (ALT 250 FOR IP): Performed by: STUDENT IN AN ORGANIZED HEALTH CARE EDUCATION/TRAINING PROGRAM

## 2025-02-05 PROCEDURE — 2700000000 HC OXYGEN THERAPY PER DAY

## 2025-02-05 PROCEDURE — 2500000003 HC RX 250 WO HCPCS: Performed by: INTERNAL MEDICINE

## 2025-02-05 PROCEDURE — 80048 BASIC METABOLIC PNL TOTAL CA: CPT

## 2025-02-05 PROCEDURE — 90935 HEMODIALYSIS ONE EVALUATION: CPT

## 2025-02-05 PROCEDURE — 1200000000 HC SEMI PRIVATE

## 2025-02-05 PROCEDURE — 89220 SPUTUM SPECIMEN COLLECTION: CPT

## 2025-02-05 PROCEDURE — 6360000002 HC RX W HCPCS: Performed by: PHYSICIAN ASSISTANT

## 2025-02-05 PROCEDURE — 94640 AIRWAY INHALATION TREATMENT: CPT

## 2025-02-05 PROCEDURE — 83735 ASSAY OF MAGNESIUM: CPT

## 2025-02-05 RX ORDER — LORAZEPAM 0.5 MG/1
0.5 TABLET ORAL EVERY 8 HOURS PRN
Status: DISCONTINUED | OUTPATIENT
Start: 2025-02-05 | End: 2025-02-06

## 2025-02-05 RX ORDER — OXYCODONE HYDROCHLORIDE 5 MG/1
5 TABLET ORAL EVERY 4 HOURS PRN
Status: DISCONTINUED | OUTPATIENT
Start: 2025-02-05 | End: 2025-02-06

## 2025-02-05 RX ORDER — IPRATROPIUM BROMIDE AND ALBUTEROL SULFATE 2.5; .5 MG/3ML; MG/3ML
1 SOLUTION RESPIRATORY (INHALATION) EVERY 6 HOURS PRN
Status: DISCONTINUED | OUTPATIENT
Start: 2025-02-05 | End: 2025-02-07 | Stop reason: HOSPADM

## 2025-02-05 RX ORDER — IPRATROPIUM BROMIDE AND ALBUTEROL SULFATE 2.5; .5 MG/3ML; MG/3ML
1 SOLUTION RESPIRATORY (INHALATION)
Status: DISCONTINUED | OUTPATIENT
Start: 2025-02-05 | End: 2025-02-07 | Stop reason: HOSPADM

## 2025-02-05 RX ORDER — ALBUMIN (HUMAN) 12.5 G/50ML
12.5 SOLUTION INTRAVENOUS
Status: ACTIVE | OUTPATIENT
Start: 2025-02-05 | End: 2025-02-05

## 2025-02-05 RX ADMIN — IPRATROPIUM BROMIDE AND ALBUTEROL SULFATE 1 DOSE: 2.5; .5 SOLUTION RESPIRATORY (INHALATION) at 07:23

## 2025-02-05 RX ADMIN — SUCRALFATE 1 G: 1 TABLET ORAL at 22:29

## 2025-02-05 RX ADMIN — IPRATROPIUM BROMIDE AND ALBUTEROL SULFATE 1 DOSE: 2.5; .5 SOLUTION RESPIRATORY (INHALATION) at 14:55

## 2025-02-05 RX ADMIN — OXYCODONE HYDROCHLORIDE 5 MG: 5 TABLET ORAL at 18:18

## 2025-02-05 RX ADMIN — LORAZEPAM 0.5 MG: 0.5 TABLET ORAL at 22:29

## 2025-02-05 RX ADMIN — MIDODRINE HYDROCHLORIDE 15 MG: 5 TABLET ORAL at 14:18

## 2025-02-05 RX ADMIN — OXYCODONE HYDROCHLORIDE 5 MG: 5 TABLET ORAL at 14:17

## 2025-02-05 RX ADMIN — LACTULOSE 20 G: 20 SOLUTION ORAL at 14:18

## 2025-02-05 RX ADMIN — ATORVASTATIN CALCIUM 40 MG: 40 TABLET, FILM COATED ORAL at 22:29

## 2025-02-05 RX ADMIN — HEPARIN SODIUM 5000 UNITS: 5000 INJECTION INTRAVENOUS; SUBCUTANEOUS at 22:29

## 2025-02-05 RX ADMIN — EPOETIN ALFA-EPBX 8000 UNITS: 4000 INJECTION, SOLUTION INTRAVENOUS; SUBCUTANEOUS at 10:07

## 2025-02-05 RX ADMIN — IPRATROPIUM BROMIDE AND ALBUTEROL SULFATE 1 DOSE: 2.5; .5 SOLUTION RESPIRATORY (INHALATION) at 11:01

## 2025-02-05 RX ADMIN — LORAZEPAM 1 MG: 2 INJECTION INTRAMUSCULAR; INTRAVENOUS at 08:14

## 2025-02-05 RX ADMIN — OXYCODONE HYDROCHLORIDE 5 MG: 5 TABLET ORAL at 22:29

## 2025-02-05 RX ADMIN — MORPHINE SULFATE 2 MG: 2 INJECTION, SOLUTION INTRAMUSCULAR; INTRAVENOUS at 01:38

## 2025-02-05 RX ADMIN — HEPARIN SODIUM 5000 UNITS: 5000 INJECTION INTRAVENOUS; SUBCUTANEOUS at 14:18

## 2025-02-05 RX ADMIN — TRAZODONE HYDROCHLORIDE 50 MG: 50 TABLET ORAL at 22:29

## 2025-02-05 RX ADMIN — LORAZEPAM 0.5 MG: 0.5 TABLET ORAL at 14:18

## 2025-02-05 RX ADMIN — RIFAXIMIN 550 MG: 550 TABLET ORAL at 22:29

## 2025-02-05 RX ADMIN — GABAPENTIN 100 MG: 100 CAPSULE ORAL at 14:17

## 2025-02-05 RX ADMIN — MIDODRINE HYDROCHLORIDE 15 MG: 5 TABLET ORAL at 05:56

## 2025-02-05 RX ADMIN — SUCRALFATE 1 G: 1 TABLET ORAL at 05:56

## 2025-02-05 RX ADMIN — LACTULOSE 20 G: 20 SOLUTION ORAL at 08:14

## 2025-02-05 RX ADMIN — HEPARIN SODIUM 5000 UNITS: 5000 INJECTION INTRAVENOUS; SUBCUTANEOUS at 05:55

## 2025-02-05 RX ADMIN — RIFAXIMIN 550 MG: 550 TABLET ORAL at 08:14

## 2025-02-05 RX ADMIN — LACTULOSE 20 G: 20 SOLUTION ORAL at 22:29

## 2025-02-05 RX ADMIN — MIDODRINE HYDROCHLORIDE 15 MG: 5 TABLET ORAL at 22:29

## 2025-02-05 RX ADMIN — SODIUM CHLORIDE, PRESERVATIVE FREE 10 ML: 5 INJECTION INTRAVENOUS at 08:14

## 2025-02-05 ASSESSMENT — PAIN DESCRIPTION - ORIENTATION
ORIENTATION: LEFT;MID
ORIENTATION: MID
ORIENTATION: MID

## 2025-02-05 ASSESSMENT — ENCOUNTER SYMPTOMS
CONSTIPATION: 0
ABDOMINAL DISTENTION: 0
DIARRHEA: 0
EYE DISCHARGE: 0
ABDOMINAL PAIN: 1
SHORTNESS OF BREATH: 1
BACK PAIN: 0
NAUSEA: 0
COUGH: 0
WHEEZING: 0
SORE THROAT: 0

## 2025-02-05 ASSESSMENT — PAIN DESCRIPTION - FREQUENCY: FREQUENCY: CONTINUOUS

## 2025-02-05 ASSESSMENT — PAIN DESCRIPTION - LOCATION
LOCATION: ABDOMEN
LOCATION: BACK
LOCATION: ABDOMEN

## 2025-02-05 ASSESSMENT — PAIN - FUNCTIONAL ASSESSMENT
PAIN_FUNCTIONAL_ASSESSMENT: ACTIVITIES ARE NOT PREVENTED

## 2025-02-05 ASSESSMENT — PAIN SCALES - GENERAL
PAINLEVEL_OUTOF10: 7
PAINLEVEL_OUTOF10: 7
PAINLEVEL_OUTOF10: 9

## 2025-02-05 ASSESSMENT — PAIN DESCRIPTION - DESCRIPTORS
DESCRIPTORS: ACHING
DESCRIPTORS: ACHING

## 2025-02-05 ASSESSMENT — PAIN DESCRIPTION - PAIN TYPE: TYPE: CHRONIC PAIN

## 2025-02-05 ASSESSMENT — PAIN DESCRIPTION - ONSET: ONSET: ON-GOING

## 2025-02-05 NOTE — RT PROTOCOL NOTE
RT Nebulizer Bronchodilator Protocol Note    There is a bronchodilator order in the chart from a provider indicating to follow the RT Bronchodilator Protocol and there is an “Initiate RT Bronchodilator Protocol” order as well (see protocol at bottom of note).    CXR Findings:  No results found.    The findings from the last RT Protocol Assessment were as follows:  Smoking: Smoker 15 pack years or more  Respiratory Pattern: Regular pattern and RR 12-20 bpm  Breath Sounds: Slightly diminished and/or crackles  Cough: Strong, productive  Indication for Bronchodilator Therapy: Decreased or absent breath sounds  Bronchodilator Assessment Score: 4    Aerosolized bronchodilator medication orders have been revised according to the RT Nebulizer Bronchodilator Protocol below.    Respiratory Therapist to perform RT Therapy Protocol Assessment initially then follow the protocol.  Repeat RT Therapy Protocol Assessment PRN for score 0-3 or on second treatment, BID, and PRN for scores above 3.    No Indications - adjust the frequency to every 6 hours PRN wheezing or bronchospasm, if no treatments needed after 48 hours then discontinue using Per Protocol order mode.     If indication present, adjust the RT bronchodilator orders based on the Bronchodilator Assessment Score as indicated below.  If a patient is on this medication at home then do not decrease Frequency below that used at home.    0-3 - enter or revise RT bronchodilator order(s) to equivalent RT Bronchodilator order with Frequency of every 4 hours PRN for wheezing or increased work of breathing using Per Protocol order mode.       4-6 - enter or revise RT Bronchodilator order(s) to two equivalent RT bronchodilator orders with one order with BID Frequency and one order with Frequency of every 4 hours PRN wheezing or increased work of breathing using Per Protocol order mode.         7-10 - enter or revise RT Bronchodilator order(s) to two equivalent RT bronchodilator

## 2025-02-05 NOTE — CARE COORDINATION
Contacted [facility] intake; spoke to Davida. Patient can return to [facility] when medically stable. No precert or PASRR needed. Candy Valles RN

## 2025-02-06 ENCOUNTER — APPOINTMENT (OUTPATIENT)
Dept: INTERVENTIONAL RADIOLOGY/VASCULAR | Age: 65
DRG: 208 | End: 2025-02-06
Attending: STUDENT IN AN ORGANIZED HEALTH CARE EDUCATION/TRAINING PROGRAM
Payer: MEDICARE

## 2025-02-06 LAB
ANION GAP SERPL CALCULATED.3IONS-SCNC: 8 MMOL/L (ref 9–17)
BASOPHILS # BLD: 0.03 K/UL
BASOPHILS NFR BLD: 1 % (ref 0–1)
BUN SERPL-MCNC: 22 MG/DL (ref 7–20)
CALCIUM SERPL-MCNC: 9.1 MG/DL (ref 8.3–10.6)
CHLORIDE SERPL-SCNC: 95 MMOL/L (ref 99–110)
CO2 SERPL-SCNC: 30 MMOL/L (ref 21–32)
CREAT SERPL-MCNC: 2.5 MG/DL (ref 0.8–1.3)
EOSINOPHIL # BLD: 0.08 K/UL
EOSINOPHILS RELATIVE PERCENT: 2 % (ref 0–3)
ERYTHROCYTE [DISTWIDTH] IN BLOOD BY AUTOMATED COUNT: 15.4 % (ref 11.7–14.9)
GFR, ESTIMATED: 26 ML/MIN/1.73M2
GLUCOSE BLD-MCNC: 39 MG/DL (ref 74–99)
GLUCOSE BLD-MCNC: 41 MG/DL (ref 74–99)
GLUCOSE BLD-MCNC: 78 MG/DL (ref 74–99)
GLUCOSE BLD-MCNC: 90 MG/DL (ref 74–99)
GLUCOSE BLD-MCNC: 96 MG/DL (ref 74–99)
GLUCOSE SERPL-MCNC: 39 MG/DL (ref 74–99)
HCT VFR BLD AUTO: 30.5 % (ref 42–52)
HGB BLD-MCNC: 9.1 G/DL (ref 13.5–18)
IMM GRANULOCYTES # BLD AUTO: 0.04 K/UL
IMM GRANULOCYTES NFR BLD: 1 %
INR PPP: 1.1
LYMPHOCYTES NFR BLD: 0.95 K/UL
LYMPHOCYTES RELATIVE PERCENT: 17 % (ref 24–44)
MCH RBC QN AUTO: 28.3 PG (ref 27–31)
MCHC RBC AUTO-ENTMCNC: 29.8 G/DL (ref 32–36)
MCV RBC AUTO: 95 FL (ref 78–100)
MICROORGANISM SPEC CULT: NORMAL
MICROORGANISM/AGENT SPEC: NORMAL
MONOCYTES NFR BLD: 0.29 K/UL
MONOCYTES NFR BLD: 5 % (ref 0–4)
NEUTROPHILS NFR BLD: 75 % (ref 36–66)
NEUTS SEG NFR BLD: 4.11 K/UL
PARTIAL THROMBOPLASTIN TIME: 37.1 SEC (ref 25.1–37.1)
PLATELET # BLD AUTO: 181 K/UL (ref 140–440)
PMV BLD AUTO: 9 FL (ref 7.5–11.1)
POTASSIUM SERPL-SCNC: 4.1 MMOL/L (ref 3.5–5.1)
PROTHROMBIN TIME: 14.4 SEC (ref 11.7–14.5)
RBC # BLD AUTO: 3.21 M/UL (ref 4.6–6.2)
SERVICE CMNT-IMP: NORMAL
SODIUM SERPL-SCNC: 133 MMOL/L (ref 136–145)
SPECIMEN DESCRIPTION: NORMAL
WBC OTHER # BLD: 5.5 K/UL (ref 4–10.5)

## 2025-02-06 PROCEDURE — 2580000003 HC RX 258: Performed by: INTERNAL MEDICINE

## 2025-02-06 PROCEDURE — C1729 CATH, DRAINAGE: HCPCS

## 2025-02-06 PROCEDURE — 80048 BASIC METABOLIC PNL TOTAL CA: CPT

## 2025-02-06 PROCEDURE — 85610 PROTHROMBIN TIME: CPT

## 2025-02-06 PROCEDURE — 89220 SPUTUM SPECIMEN COLLECTION: CPT

## 2025-02-06 PROCEDURE — 2500000003 HC RX 250 WO HCPCS: Performed by: STUDENT IN AN ORGANIZED HEALTH CARE EDUCATION/TRAINING PROGRAM

## 2025-02-06 PROCEDURE — 6370000000 HC RX 637 (ALT 250 FOR IP): Performed by: INTERNAL MEDICINE

## 2025-02-06 PROCEDURE — 2700000000 HC OXYGEN THERAPY PER DAY

## 2025-02-06 PROCEDURE — 1200000000 HC SEMI PRIVATE

## 2025-02-06 PROCEDURE — 6370000000 HC RX 637 (ALT 250 FOR IP): Performed by: STUDENT IN AN ORGANIZED HEALTH CARE EDUCATION/TRAINING PROGRAM

## 2025-02-06 PROCEDURE — 85730 THROMBOPLASTIN TIME PARTIAL: CPT

## 2025-02-06 PROCEDURE — 6360000002 HC RX W HCPCS: Performed by: INTERNAL MEDICINE

## 2025-02-06 PROCEDURE — 94664 DEMO&/EVAL PT USE INHALER: CPT

## 2025-02-06 PROCEDURE — 2500000003 HC RX 250 WO HCPCS: Performed by: INTERNAL MEDICINE

## 2025-02-06 PROCEDURE — 76937 US GUIDE VASCULAR ACCESS: CPT

## 2025-02-06 PROCEDURE — 0W9G3ZZ DRAINAGE OF PERITONEAL CAVITY, PERCUTANEOUS APPROACH: ICD-10-PCS | Performed by: RADIOLOGY

## 2025-02-06 PROCEDURE — 85025 COMPLETE CBC W/AUTO DIFF WBC: CPT

## 2025-02-06 PROCEDURE — 49083 ABD PARACENTESIS W/IMAGING: CPT

## 2025-02-06 PROCEDURE — 36415 COLL VENOUS BLD VENIPUNCTURE: CPT

## 2025-02-06 PROCEDURE — 82962 GLUCOSE BLOOD TEST: CPT

## 2025-02-06 PROCEDURE — 94761 N-INVAS EAR/PLS OXIMETRY MLT: CPT

## 2025-02-06 PROCEDURE — 94640 AIRWAY INHALATION TREATMENT: CPT

## 2025-02-06 RX ORDER — DEXTROSE MONOHYDRATE 25 G/50ML
25 INJECTION, SOLUTION INTRAVENOUS PRN
Status: DISCONTINUED | OUTPATIENT
Start: 2025-02-06 | End: 2025-02-07 | Stop reason: HOSPADM

## 2025-02-06 RX ORDER — OXYCODONE HYDROCHLORIDE 5 MG/1
5 TABLET ORAL EVERY 8 HOURS PRN
Status: DISCONTINUED | OUTPATIENT
Start: 2025-02-06 | End: 2025-02-07

## 2025-02-06 RX ADMIN — HEPARIN SODIUM 5000 UNITS: 5000 INJECTION INTRAVENOUS; SUBCUTANEOUS at 20:49

## 2025-02-06 RX ADMIN — OXYCODONE HYDROCHLORIDE 5 MG: 5 TABLET ORAL at 10:36

## 2025-02-06 RX ADMIN — GABAPENTIN 100 MG: 100 CAPSULE ORAL at 10:31

## 2025-02-06 RX ADMIN — SUCRALFATE 1 G: 1 TABLET ORAL at 12:59

## 2025-02-06 RX ADMIN — MIDODRINE HYDROCHLORIDE 15 MG: 5 TABLET ORAL at 12:59

## 2025-02-06 RX ADMIN — ATORVASTATIN CALCIUM 40 MG: 40 TABLET, FILM COATED ORAL at 20:49

## 2025-02-06 RX ADMIN — OXYCODONE HYDROCHLORIDE 5 MG: 5 TABLET ORAL at 16:07

## 2025-02-06 RX ADMIN — MIDODRINE HYDROCHLORIDE 15 MG: 5 TABLET ORAL at 06:27

## 2025-02-06 RX ADMIN — LORAZEPAM 0.5 MG: 0.5 TABLET ORAL at 06:27

## 2025-02-06 RX ADMIN — RIFAXIMIN 550 MG: 550 TABLET ORAL at 10:31

## 2025-02-06 RX ADMIN — ONDANSETRON 4 MG: 2 INJECTION INTRAMUSCULAR; INTRAVENOUS at 10:46

## 2025-02-06 RX ADMIN — TRAZODONE HYDROCHLORIDE 50 MG: 50 TABLET ORAL at 20:49

## 2025-02-06 RX ADMIN — ONDANSETRON 4 MG: 4 TABLET, ORALLY DISINTEGRATING ORAL at 21:07

## 2025-02-06 RX ADMIN — SUCRALFATE 1 G: 1 TABLET ORAL at 06:27

## 2025-02-06 RX ADMIN — IPRATROPIUM BROMIDE AND ALBUTEROL SULFATE 1 DOSE: .5; 2.5 SOLUTION RESPIRATORY (INHALATION) at 19:45

## 2025-02-06 RX ADMIN — OXYCODONE HYDROCHLORIDE 5 MG: 5 TABLET ORAL at 06:27

## 2025-02-06 RX ADMIN — SUCRALFATE 1 G: 1 TABLET ORAL at 20:49

## 2025-02-06 RX ADMIN — SODIUM CHLORIDE, PRESERVATIVE FREE 10 ML: 5 INJECTION INTRAVENOUS at 10:32

## 2025-02-06 RX ADMIN — DEXTROSE MONOHYDRATE: 100 INJECTION, SOLUTION INTRAVENOUS at 17:20

## 2025-02-06 RX ADMIN — HEPARIN SODIUM 5000 UNITS: 5000 INJECTION INTRAVENOUS; SUBCUTANEOUS at 06:27

## 2025-02-06 RX ADMIN — IPRATROPIUM BROMIDE AND ALBUTEROL SULFATE 1 DOSE: .5; 2.5 SOLUTION RESPIRATORY (INHALATION) at 07:45

## 2025-02-06 RX ADMIN — OXYCODONE HYDROCHLORIDE 5 MG: 5 TABLET ORAL at 02:58

## 2025-02-06 RX ADMIN — RIFAXIMIN 550 MG: 550 TABLET ORAL at 20:49

## 2025-02-06 RX ADMIN — LACTULOSE 20 G: 20 SOLUTION ORAL at 20:49

## 2025-02-06 RX ADMIN — DEXTROSE MONOHYDRATE 25 G: 25 INJECTION, SOLUTION INTRAVENOUS at 15:18

## 2025-02-06 RX ADMIN — LACTULOSE 20 G: 20 SOLUTION ORAL at 12:59

## 2025-02-06 RX ADMIN — SODIUM CHLORIDE, PRESERVATIVE FREE 10 ML: 5 INJECTION INTRAVENOUS at 20:49

## 2025-02-06 RX ADMIN — MIDODRINE HYDROCHLORIDE 15 MG: 5 TABLET ORAL at 20:49

## 2025-02-06 ASSESSMENT — ENCOUNTER SYMPTOMS
NAUSEA: 0
SHORTNESS OF BREATH: 1
WHEEZING: 0
SORE THROAT: 0
CONSTIPATION: 0
ABDOMINAL PAIN: 1
ABDOMINAL DISTENTION: 1
EYE DISCHARGE: 0
BACK PAIN: 0
COUGH: 0
DIARRHEA: 0

## 2025-02-06 ASSESSMENT — PAIN SCALES - WONG BAKER
WONGBAKER_NUMERICALRESPONSE: NO HURT
WONGBAKER_NUMERICALRESPONSE: NO HURT

## 2025-02-06 ASSESSMENT — PAIN DESCRIPTION - DESCRIPTORS
DESCRIPTORS: ACHING
DESCRIPTORS: ACHING

## 2025-02-06 ASSESSMENT — PAIN SCALES - GENERAL
PAINLEVEL_OUTOF10: 8
PAINLEVEL_OUTOF10: 5
PAINLEVEL_OUTOF10: 8
PAINLEVEL_OUTOF10: 9
PAINLEVEL_OUTOF10: 0

## 2025-02-06 ASSESSMENT — PAIN DESCRIPTION - LOCATION
LOCATION: BACK
LOCATION: ABDOMEN

## 2025-02-06 ASSESSMENT — PAIN DESCRIPTION - ORIENTATION
ORIENTATION: INNER
ORIENTATION: INNER

## 2025-02-06 NOTE — CONSULTS
[address] [place] [address], [address]  [place] [address]  Phone: [phone]  Office Hours: 8:30AM - 4:30PM  Monday - Friday     Nephrology Service Consultation    Patient:  Tico Day  MRN: [id number]  Consulting physician:  Gabriele Nobles Jr., *  Reason for Consult: ESKD    History Obtained From:  electronic medical record  PCP: Carlos Nina MD    Assessment and Recommendations     Patient Active Problem List   Diagnosis Code    Acute on chronic respiratory failure J96.20    Moderate malnutrition (HCC) E44.0    Pneumonia of both lower lobes due to Pseudomonas species (HCC) J15.1    Central line infection T80.219A    Acute on chronic hypoxic respiratory failure J96.21    Decompensation of cirrhosis of liver (HCC) K72.90, K74.60    Ascites of liver R18.8    Hyperkalemia E87.5       Renal Function Monitoring on dialysis  Dialysis Status ESKD on MWF hemodialysis   Blood Pressure Management- BP low on pressors  Volume Status hypervolemic- just ha paracentesis removal of 4,5 Litres  Electrolytes- K 6.8  Acid/Base acidotic HAGMA  Mineral/Bone Disease Management  Anemia Management    As his K is high despite medical therapy- will start HD asap to correct acidosis and hyperkalemia  Dr Hoskins will follow in am    Poncho [place] MD [facility]    -----------------------------------------------------------------    HISTORY OF PRESENT ILLNESS:   The patient is a 64 y.o. male who presents with a complicated past medical history significant for cirrhosis, heart failure reduced EF with an ejection fraction of 40%, chronic respiratory failure on chronic ventilator, coronary artery disease status post PCI in 2022, ESRD on hemodialysis Monday Wednesday who presented to the ER today with worsening abdominal distention, shortness of breath   Sees dr Hoskins at the NH    Past Medical History:        Diagnosis Date    Acute and chronic respiratory failure with hypoxia     Anxiety     COPD (chronic obstructive pulmonary 
Comprehensive Nutrition Assessment    Type and Reason for Visit:  Initial, Consult, Wound (Wilfrid score)    Nutrition Recommendations/Plan:   Recommend SLP eval prior to starting oral diet  Recommend starting EN: Vital AF 1.2 (peptide based formula) @ 65mL/hr continuous w/ 30mL FWF Q4H to provide 1872kcal, 117g PRO, and 1450mL fluids per day. This will meet 100% est protein needs and 84% est calorie needs.   Monitor GI status, weights, diet advancement/po intakes, labs, skin, PO     Malnutrition Assessment:  Malnutrition Status:  Moderate malnutrition (02/04/25 1305)    Context:  Chronic Illness     Findings of the 6 clinical characteristics of malnutrition:  Energy Intake:  Mild decrease in energy intake  Weight Loss:  Mild weight loss     Body Fat Loss:  Severe body fat loss Buccal region, Triceps, Orbital   Muscle Mass Loss:  Mild muscle mass loss (mild-moderate) Clavicles (pectoralis & deltoids), Thigh (quadriceps), Calf (gastrocnemius)  Fluid Accumulation:  Mild Extremities   Strength:  Not Performed    Nutrition Assessment:    Admitted w/ hyperkalemia, complicated past medical history significant for cirrhosis, heart failure reduced EF with an ejection fraction of 40%, chronic respiratory failure on chronic ventilator, coronary artery disease status post PCI in 2022, ESRD on hemodialysis. Per [facility], pt on mechanical soft diet (per pt does not eat much due to feeling bloated) and Vital 1.5 TF @ 55ml/hr continuous. Pt has lost a non-significant amount of wt x3mo, pt unsure why, likely r/t fluid shifts and increased needs w/ wounds + dialysis. Pt does have mild-severe fat/muscle loss, meets criteria for malnutrition. Perfectserved MD for SLP eval, pt also asking about a speaking valve. Will provide TF recs. Follow at high nutrition risk.    Nutrition Related Findings:    +lactulose, carafate; hgb 7.5, GFR 30, BUN 24, Cr 2.2 Wound Type: Multiple, Pressure Injury, Stage II, Moisture Associate Skin Damage   
Consult completed. Nexiva 22g 1.75\" peripheral IV initiated into left forearm x 1 attempt using ultrasound guided technique. Brisk blood return, flushes without resistance. Patient tolerated well. Primary nurse notified.     Consult the Vascular Access Team for questions, concerns, or change in patient's condition.   
  Wound Thickness Description not for Pressure Injury Partial thickness 02/03/25 1000   Number of days: 0       Wound 02/03/25 Abdomen Right fold (Active)   Wound Image   02/03/25 1000   Wound Etiology Other 02/03/25 1000   Dressing Status New dressing applied 02/03/25 1000   Wound Cleansed Cleansed with saline 02/03/25 1000   Dressing/Treatment Collagen;Silicone border 02/03/25 1000   Wound Length (cm) 1 cm 02/03/25 1000   Wound Width (cm) 8 cm 02/03/25 1000   Wound Depth (cm) 0.1 cm 02/03/25 1000   Wound Surface Area (cm^2) 8 cm^2 02/03/25 1000   Wound Volume (cm^3) 0.8 cm^3 02/03/25 1000   Distance Tunneling (cm) 0 cm 02/03/25 1000   Tunneling Position ___ O'Clock 0 02/03/25 1000   Undermining Starts ___ O'Clock 0 02/03/25 1000   Undermining Ends___ O'Clock 0 02/03/25 1000   Undermining Maxium Distance (cm) 0 02/03/25 1000   Wound Assessment Pink/red 02/03/25 1000   Drainage Amount Moderate (25-50%) 02/03/25 1000   Drainage Description Serosanguinous 02/03/25 1000   Odor None 02/03/25 1000   Suzette-wound Assessment Intact 02/03/25 1000   Margins Defined edges 02/03/25 1000   Wound Thickness Description not for Pressure Injury Full thickness 02/03/25 1000   Number of days: 0       Wound 02/03/25 Buttocks Right;Lower thigh (Active)   Wound Image   02/03/25 1000   Wound Etiology Pressure Stage 2 02/03/25 1000   Dressing Status New dressing applied 02/03/25 1000   Wound Cleansed Cleansed with saline 02/03/25 1000   Dressing/Treatment Collagen;Silicone border 02/03/25 1000   Wound Length (cm) 0.4 cm 02/03/25 1000   Wound Width (cm) 0.4 cm 02/03/25 1000   Wound Depth (cm) 0.1 cm 02/03/25 1000   Wound Surface Area (cm^2) 0.16 cm^2 02/03/25 1000   Wound Volume (cm^3) 0.016 cm^3 02/03/25 1000   Distance Tunneling (cm) 0 cm 02/03/25 1000   Tunneling Position ___ O'Clock 0 02/03/25 1000   Undermining Starts ___ O'Clock 0 02/03/25 1000   Undermining Ends___ O'Clock 0 02/03/25 1000   Undermining Maxium Distance (cm) 0 02/03/25

## 2025-02-07 VITALS
HEIGHT: 69 IN | HEART RATE: 75 BPM | BODY MASS INDEX: 29.98 KG/M2 | DIASTOLIC BLOOD PRESSURE: 50 MMHG | OXYGEN SATURATION: 93 % | RESPIRATION RATE: 18 BRPM | SYSTOLIC BLOOD PRESSURE: 92 MMHG | WEIGHT: 202.38 LBS | TEMPERATURE: 97.9 F

## 2025-02-07 LAB
ALBUMIN SERPL-MCNC: 2.4 G/DL (ref 3.4–5)
ALBUMIN/GLOB SERPL: 0.7 {RATIO} (ref 1.1–2.2)
ALP SERPL-CCNC: 97 U/L (ref 40–129)
ALT SERPL-CCNC: 5 U/L (ref 10–40)
ANION GAP SERPL CALCULATED.3IONS-SCNC: 7 MMOL/L (ref 9–17)
AST SERPL-CCNC: 17 U/L (ref 15–37)
BASOPHILS # BLD: 0.04 K/UL
BASOPHILS NFR BLD: 1 % (ref 0–1)
BILIRUB SERPL-MCNC: 0.2 MG/DL (ref 0–1)
BUN SERPL-MCNC: 25 MG/DL (ref 7–20)
CALCIUM SERPL-MCNC: 9.3 MG/DL (ref 8.3–10.6)
CHLORIDE SERPL-SCNC: 93 MMOL/L (ref 99–110)
CO2 SERPL-SCNC: 30 MMOL/L (ref 21–32)
CREAT SERPL-MCNC: 2.7 MG/DL (ref 0.8–1.3)
EOSINOPHIL # BLD: 0.21 K/UL
EOSINOPHILS RELATIVE PERCENT: 4 % (ref 0–3)
ERYTHROCYTE [DISTWIDTH] IN BLOOD BY AUTOMATED COUNT: 15.4 % (ref 11.7–14.9)
GFR, ESTIMATED: 24 ML/MIN/1.73M2
GLUCOSE BLD-MCNC: 111 MG/DL (ref 74–99)
GLUCOSE BLD-MCNC: 112 MG/DL (ref 74–99)
GLUCOSE BLD-MCNC: 66 MG/DL (ref 74–99)
GLUCOSE BLD-MCNC: 82 MG/DL (ref 74–99)
GLUCOSE BLD-MCNC: 83 MG/DL (ref 74–99)
GLUCOSE BLD-MCNC: 96 MG/DL (ref 74–99)
GLUCOSE SERPL-MCNC: 87 MG/DL (ref 74–99)
HCT VFR BLD AUTO: 29.7 % (ref 42–52)
HGB BLD-MCNC: 8.9 G/DL (ref 13.5–18)
IMM GRANULOCYTES # BLD AUTO: 0.03 K/UL
IMM GRANULOCYTES NFR BLD: 1 %
LYMPHOCYTES NFR BLD: 1.15 K/UL
LYMPHOCYTES RELATIVE PERCENT: 19 % (ref 24–44)
MAGNESIUM SERPL-MCNC: 2.4 MG/DL (ref 1.8–2.4)
MCH RBC QN AUTO: 28.6 PG (ref 27–31)
MCHC RBC AUTO-ENTMCNC: 30 G/DL (ref 32–36)
MCV RBC AUTO: 95.5 FL (ref 78–100)
MONOCYTES NFR BLD: 0.36 K/UL
MONOCYTES NFR BLD: 6 % (ref 0–4)
NEUTROPHILS NFR BLD: 70 % (ref 36–66)
NEUTS SEG NFR BLD: 4.27 K/UL
PHOSPHATE SERPL-MCNC: 3.4 MG/DL (ref 2.5–4.9)
PLATELET # BLD AUTO: 179 K/UL (ref 140–440)
PMV BLD AUTO: 9.1 FL (ref 7.5–11.1)
POTASSIUM SERPL-SCNC: 4.2 MMOL/L (ref 3.5–5.1)
PROT SERPL-MCNC: 5.5 G/DL (ref 6.4–8.2)
RBC # BLD AUTO: 3.11 M/UL (ref 4.6–6.2)
SODIUM SERPL-SCNC: 129 MMOL/L (ref 136–145)
WBC OTHER # BLD: 6.1 K/UL (ref 4–10.5)

## 2025-02-07 PROCEDURE — 83735 ASSAY OF MAGNESIUM: CPT

## 2025-02-07 PROCEDURE — 85025 COMPLETE CBC W/AUTO DIFF WBC: CPT

## 2025-02-07 PROCEDURE — 6370000000 HC RX 637 (ALT 250 FOR IP)

## 2025-02-07 PROCEDURE — 2700000000 HC OXYGEN THERAPY PER DAY

## 2025-02-07 PROCEDURE — 6370000000 HC RX 637 (ALT 250 FOR IP): Performed by: INTERNAL MEDICINE

## 2025-02-07 PROCEDURE — 6360000002 HC RX W HCPCS: Performed by: INTERNAL MEDICINE

## 2025-02-07 PROCEDURE — 6370000000 HC RX 637 (ALT 250 FOR IP): Performed by: STUDENT IN AN ORGANIZED HEALTH CARE EDUCATION/TRAINING PROGRAM

## 2025-02-07 PROCEDURE — 80053 COMPREHEN METABOLIC PANEL: CPT

## 2025-02-07 PROCEDURE — 82962 GLUCOSE BLOOD TEST: CPT

## 2025-02-07 PROCEDURE — 93005 ELECTROCARDIOGRAM TRACING: CPT | Performed by: STUDENT IN AN ORGANIZED HEALTH CARE EDUCATION/TRAINING PROGRAM

## 2025-02-07 PROCEDURE — 2500000003 HC RX 250 WO HCPCS: Performed by: INTERNAL MEDICINE

## 2025-02-07 PROCEDURE — 84100 ASSAY OF PHOSPHORUS: CPT

## 2025-02-07 PROCEDURE — 5A1D70Z PERFORMANCE OF URINARY FILTRATION, INTERMITTENT, LESS THAN 6 HOURS PER DAY: ICD-10-PCS | Performed by: INTERNAL MEDICINE

## 2025-02-07 PROCEDURE — P9047 ALBUMIN (HUMAN), 25%, 50ML: HCPCS | Performed by: INTERNAL MEDICINE

## 2025-02-07 PROCEDURE — 94761 N-INVAS EAR/PLS OXIMETRY MLT: CPT

## 2025-02-07 PROCEDURE — 94640 AIRWAY INHALATION TREATMENT: CPT

## 2025-02-07 RX ORDER — ALBUMIN (HUMAN) 12.5 G/50ML
12.5 SOLUTION INTRAVENOUS
Status: COMPLETED | OUTPATIENT
Start: 2025-02-07 | End: 2025-02-07

## 2025-02-07 RX ORDER — LORAZEPAM 0.5 MG/1
0.5 TABLET ORAL EVERY 8 HOURS PRN
Status: ON HOLD | COMMUNITY
End: 2025-02-07

## 2025-02-07 RX ORDER — OXYCODONE HYDROCHLORIDE 5 MG/1
5 TABLET ORAL EVERY 4 HOURS PRN
Status: ON HOLD | COMMUNITY
End: 2025-02-07

## 2025-02-07 RX ORDER — ALBUMIN (HUMAN) 12.5 G/50ML
25 SOLUTION INTRAVENOUS 3 TIMES DAILY PRN
Status: DISCONTINUED | OUTPATIENT
Start: 2025-02-07 | End: 2025-02-07 | Stop reason: HOSPADM

## 2025-02-07 RX ORDER — OXYCODONE HYDROCHLORIDE 5 MG/1
5 TABLET ORAL EVERY 4 HOURS PRN
Qty: 4 TABLET | Refills: 0 | Status: SHIPPED | OUTPATIENT
Start: 2025-02-07 | End: 2025-02-08

## 2025-02-07 RX ORDER — LORAZEPAM 0.5 MG/1
0.5 TABLET ORAL EVERY 8 HOURS PRN
Status: DISCONTINUED | OUTPATIENT
Start: 2025-02-07 | End: 2025-02-07 | Stop reason: HOSPADM

## 2025-02-07 RX ORDER — OXYCODONE HYDROCHLORIDE 5 MG/1
5 TABLET ORAL EVERY 4 HOURS PRN
Status: DISCONTINUED | OUTPATIENT
Start: 2025-02-07 | End: 2025-02-07 | Stop reason: HOSPADM

## 2025-02-07 RX ORDER — LORAZEPAM 0.5 MG/1
0.5 TABLET ORAL EVERY 8 HOURS PRN
Qty: 3 TABLET | Refills: 0 | Status: SHIPPED | OUTPATIENT
Start: 2025-02-07 | End: 2025-02-08

## 2025-02-07 RX ORDER — HYDROXYZINE HYDROCHLORIDE 10 MG/1
10 TABLET, FILM COATED ORAL ONCE
Status: COMPLETED | OUTPATIENT
Start: 2025-02-07 | End: 2025-02-07

## 2025-02-07 RX ADMIN — ALBUMIN (HUMAN) 12.5 G: 0.25 INJECTION, SOLUTION INTRAVENOUS at 08:10

## 2025-02-07 RX ADMIN — SUCRALFATE 1 G: 1 TABLET ORAL at 13:57

## 2025-02-07 RX ADMIN — OXYCODONE HYDROCHLORIDE 5 MG: 5 TABLET ORAL at 11:27

## 2025-02-07 RX ADMIN — ONDANSETRON 4 MG: 4 TABLET, ORALLY DISINTEGRATING ORAL at 11:50

## 2025-02-07 RX ADMIN — RIFAXIMIN 550 MG: 550 TABLET ORAL at 11:27

## 2025-02-07 RX ADMIN — EPOETIN ALFA-EPBX 10000 UNITS: 10000 INJECTION, SOLUTION INTRAVENOUS; SUBCUTANEOUS at 09:23

## 2025-02-07 RX ADMIN — SODIUM CHLORIDE, PRESERVATIVE FREE 10 ML: 5 INJECTION INTRAVENOUS at 11:28

## 2025-02-07 RX ADMIN — ALBUMIN (HUMAN) 25 G: 0.25 INJECTION, SOLUTION INTRAVENOUS at 08:53

## 2025-02-07 RX ADMIN — LORAZEPAM 0.5 MG: 0.5 TABLET ORAL at 13:57

## 2025-02-07 RX ADMIN — GABAPENTIN 100 MG: 100 CAPSULE ORAL at 11:27

## 2025-02-07 RX ADMIN — OXYCODONE HYDROCHLORIDE 5 MG: 5 TABLET ORAL at 00:53

## 2025-02-07 RX ADMIN — HEPARIN SODIUM 5000 UNITS: 5000 INJECTION INTRAVENOUS; SUBCUTANEOUS at 13:57

## 2025-02-07 RX ADMIN — HEPARIN SODIUM 5000 UNITS: 5000 INJECTION INTRAVENOUS; SUBCUTANEOUS at 05:24

## 2025-02-07 RX ADMIN — MIDODRINE HYDROCHLORIDE 15 MG: 5 TABLET ORAL at 05:24

## 2025-02-07 RX ADMIN — ALBUMIN (HUMAN) 25 G: 0.25 INJECTION, SOLUTION INTRAVENOUS at 08:38

## 2025-02-07 RX ADMIN — MIDODRINE HYDROCHLORIDE 15 MG: 5 TABLET ORAL at 13:56

## 2025-02-07 RX ADMIN — SUCRALFATE 1 G: 1 TABLET ORAL at 05:24

## 2025-02-07 RX ADMIN — LACTULOSE 20 G: 20 SOLUTION ORAL at 11:27

## 2025-02-07 RX ADMIN — LACTULOSE 20 G: 20 SOLUTION ORAL at 13:56

## 2025-02-07 RX ADMIN — IPRATROPIUM BROMIDE AND ALBUTEROL SULFATE 1 DOSE: .5; 2.5 SOLUTION RESPIRATORY (INHALATION) at 08:19

## 2025-02-07 RX ADMIN — HYDROXYZINE HYDROCHLORIDE 10 MG: 10 TABLET, FILM COATED ORAL at 02:45

## 2025-02-07 ASSESSMENT — PAIN DESCRIPTION - LOCATION
LOCATION: GENERALIZED
LOCATION: BACK;NECK

## 2025-02-07 ASSESSMENT — PAIN - FUNCTIONAL ASSESSMENT
PAIN_FUNCTIONAL_ASSESSMENT: PREVENTS OR INTERFERES WITH MANY ACTIVE NOT PASSIVE ACTIVITIES
PAIN_FUNCTIONAL_ASSESSMENT: ACTIVITIES ARE NOT PREVENTED

## 2025-02-07 ASSESSMENT — PAIN DESCRIPTION - DESCRIPTORS
DESCRIPTORS: DISCOMFORT;ACHING
DESCRIPTORS: ACHING

## 2025-02-07 ASSESSMENT — PAIN SCALES - GENERAL
PAINLEVEL_OUTOF10: 0
PAINLEVEL_OUTOF10: 7
PAINLEVEL_OUTOF10: 9
PAINLEVEL_OUTOF10: 3

## 2025-02-07 ASSESSMENT — PAIN DESCRIPTION - ORIENTATION: ORIENTATION: LOWER;MID

## 2025-02-07 NOTE — PROGRESS NOTES
Inpatient critical care progress note 2/3/2025        iTco Day  1960  [id number]      Assessment/Plan:    Chronic respiratory failure with hypoxemia, ventilator dependency  Tracheostomy status  Chronic systolic heart failure EF 40%  Cirrhosis cryptogenic (status post paracentesis 4 L by Dr. Hernandez 2/3/2025)  End-stage renal disease  Hypotension chronic      Continue mechanical ventilatory support, pulmonary hygiene measures  Hemodynamic support  Dialytic support per nephrology service  Ulcer, DVT prophylaxis  Maintain G-tube to gravity given high output noted  Review of paracentesis results (unlikely SBP)    Complex decisions required for evaluation management reviewed during critical care rounds.    The patient suffers from critical illness including acute on chronic respiratory failure with hypoxemia mandating mechanical ventilatory support, hypotension necessitating persistent adjustment of vasopressor/vasoactive agents, end-stage renal disease.  Without current level aggressive supportive medical measures untoward mortality.  34 minutes critical care time required for today's evaluation management independent of any time required for the performance of procedures.  This time is also in addition to the time required by Dr. Hernandez to perform her evaluation and procedure earlier today.    [facility]  [phone]      Subjective:    Patient remains on full ventilatory support, FiO2 PEEP 60% 5 cm water pressure respectively although SpO2 100%.    Hemodynamic support Levophed infusion, 7 mcg    Arouses, not highly interactive        Review of Systems     Unable to obtain review of system    Physical Exam:           /65   Pulse 82   Temp 98.4 °F (36.9 °C)   Resp 22   Ht 1.753 m (5' 9\")   Wt 91.8 kg (202 lb 6.1 oz)   SpO2 98%   BMI 29.89 kg/m²       General: Chronically ill-appearing male, arouses not highly interactive, vitals reviewed.  Eyes: Pupils, motor intact  ENT: Head 
     Inpatient critical care progress note 2/4/2025        Tico Day  1960  [id number]      Assessment/Plan:    Chronic respiratory failure with hypoxemia, \"ventilator dependency\"  Tracheostomy status  Chronic systolic heart failure EF 40%  Cirrhosis cryptogenic (status post paracentesis 4 L by Dr. Hernandez 2/3/2025 fluid analysis not suggestive of SBP)  End-stage renal disease  Hypotension, chronic      Transition off ventilatory support to tolerance  Hemodynamic support although currently free of continuous vasopressor administration with acceptable blood pressure  Dialytic support per nephrology service  Ulcer, DVT prophylaxis  Maintain G-tube to gravity given high output noted      Complex decisions required for evaluation management reviewed during critical care rounds.    The patient suffers from critical illness including acute on chronic respiratory failure with hypoxemia mandating mechanical ventilatory support, hypotension necessitating persistent adjustment of vasopressor/vasoactive agents, end-stage renal disease.  Without current level aggressive supportive medical measures untoward mortality.  31 minutes critical care time required for today's evaluation management independent of any time required for the performance of procedures.  This time is also in addition to the time required by Dr. Hernandez to perform her evaluation and procedure earlier today.    [facility]  [phone]      Subjective:    The patient's tracheostomy tube was advertently dislodged this morning which I replaced without difficulty.  Since he was comfortable free of ventilatory support, transition to trach mask.    Acceptable hemodynamics currently free of vasopressor requirement (note patient received salt poor albumin overnight in light of hypotension, slated to receive 1 unit of packed red cells however currently on hold in light of acceptable hemodynamic status)    Anuric        Review of Systems     Unable to 
     Inpatient critical care progress note 2/5/2025        Tico Day  1960  [id number]      Assessment/Plan:    Chronic respiratory failure with hypoxemia, \"ventilator dependency\"  Tracheostomy status  Chronic systolic heart failure EF 40%  Cirrhosis cryptogenic (status post paracentesis 4 L by Dr. Hernandez 2/3/2025 fluid analysis not suggestive of SBP)  End-stage renal disease  Hypotension, chronic      Transitioned off ventilatory support to tolerance  Currently free of continuous vasopressor administration (other than midodrine) with acceptable blood pressure  Dialytic support per nephrology service  Ulcer, DVT prophylaxis  Maintain G-tube to gravity given high output noted      Complex decisions required for evaluation management reviewed during critical care rounds.    Transfer out of ICU setting, transition care to hospital medicine service.      [facility]  [phone]      Subjective:    No new issues reported overnight.  The patient is currently free of ventilatory support, acceptable saturation values via trach mask.    Acceptable blood pressure free of intravenous pressor administration    Note high output from G-tube approximately 1.1 L over the past 24 hours (to gravity)    Anuric        Review of Systems     Unable to obtain review of system    Physical Exam:           /66   Pulse 82   Temp 98.2 °F (36.8 °C)   Resp 19   Ht 1.753 m (5' 9\")   Wt 91.8 kg (202 lb 6.1 oz)   SpO2 100%   BMI 29.89 kg/m²       General: Chronically ill-appearing male, arouses not highly interactive but does follow simple commands, vitals reviewed.  Eyes: Pupils, motor intact  ENT: Head normal.  Hearing intact (opens eyes to name).  Neck cuffed Shiley tracheostomy device  Cardiovascular: Regular rate, distant cardiac tones  Respiratory: Coarse breath sounds bilaterally, improve following suctioning  Gastrointestinal: Obese distended abdomen, bowel sounds throughout.  Superficial venous structures over 
    V2.0  [facility] [place]      Name:  Tico Day /Age/Sex: 1960  (64 y.o. male)   MRN & CSN:  [id number] & [id number] Encounter Date/Time: 2/3/2025 8:17 PM EST    Location:  -A PCP: Carlos Nina MD       Hospital Day: 2    Assessment and Plan:   Tico Day is a 64 y.o. male who presents with Hyperkalemia    Assessment and Plan:  Acute on chronic hypoxic hypercarbic respiratory failure with underlying decompensated cirrhosis and ESRD and volume overload.  Plan for therapeutic paracentesis.  Intubated mechanically ventilated with guarded prognosis  Multifactorial shock currently on Levophed with a chronic trach in place 35% FiO2  ESRD on  dialysis per nephrology on board  Hyperkalemia in the setting of ESRD and acidosis last hemodialysis was on Friday received bicarb drip and Kayexalate along with insulin glucose in the ED.  Still having hyperkalemia.  Primarily under ICU level care will follow peripherally until downgraded  Hyponatremia in the setting of above  Chronic systolic heart failure with preserved ejection fraction does not appear to be decompensated  Decompensated cirrhosis    Medical Decision Making:  The following items were considered in medical decision making:  Discussion of patient care with other providers  Reviewed clinical lab tests if any  Reviewed radiology tests if any  Reviewed other diagnostic tests/interventions  Independent review of radiologic images if any  Microbiology cultures and other micro tests if any    Estimated time spent for medical decision-making encompassing complexity of the case, history taking, medication review, physical examination, communication with family, RN, , discussion with specialists, and ancillary staff members to provide accurate care for the patient was around 25 minutes.    MDM (any 2 required for High level billing)     A. Problems (any 1)  [x] Acute/Chronic Illness/injury 
   02/04/25 0835   Oxygen Therapy/Pulse Ox   O2 Therapy Oxygen humidified   O2 Device Trach mask   O2 Flow Rate (L/min) 5 L/min   FiO2  28 %   Pulse 75   Respirations 16   SpO2 100 %     REMOVED PATIENT FROM VENT AND PLACED ON A 5L 28% TRACH COLLAR  
  [address] [place] [address], [address] [place] [address]  Phone: [phone]  Office Hours: 8:30AM - 4:30PM  Monday - Friday      Nephrology  Dialysis Note        PROCEDURE:  Patient seen during hemodialysis      PHYSICIAN:  PK      INDICATION:  Congestive heart failure, End-stage renal disease, Hyperkalemia, Metabolic acidosis (Anion gap)      RX:  See dialysis flowsheet for specifics on access, blood flow rate, dialysate baths, duration of dialysis, anticoagulation and other technical information.      COMMENTS:  stable- on pressors  Tolerating well              Poncho Hill MD FACP [facility]  
  [address] [place] [address], [address] [place] [address]  Phone: [phone]  Office Hours: 8:30AM - 4:30PM  Monday - Friday      Nephrology  Dialysis Note        PROCEDURE:  Patient seen during hemodialysis      PHYSICIAN:  PK      INDICATION:  Congestive heart failure, End-stage renal disease, Hyperkalemia, Metabolic acidosis (Anion gap)      RX:  See dialysis flowsheet for specifics on access, blood flow rate, dialysate baths, duration of dialysis, anticoagulation and other technical information.      COMMENTS:  stable- on pressors  Will use albumin              Poncho Hill MD FACP [facility]  
  [address] [place] [address], [address] [place] [address]  Phone: [phone]  Office Hours: 8:30AM - 4:30PM  Monday - Friday     Nephrology Progress Note  2/4/2025 8:04 AM        Assessment and Plan:     Patient Active Problem List:     Acute on chronic respiratory failure     Moderate malnutrition (HCC)     Pneumonia of both lower lobes due to Pseudomonas species (HCC)     Central line infection     Acute on chronic hypoxic respiratory failure     Decompensation of cirrhosis of liver (HCC)     Ascites of liver     Hyperkalemia      Renal Function Monitoring stable  Dialysis Status MWF  Blood Pressure Management improved  Volume Status stable  Electrolytes K normalized  Acid/Base improved  Mineral/Bone Disease Management  Anemia Management    Poncho Hill MD FACP, FASN, FAS    Subjective:   Admit Date: 2/2/2025  PCP: Carlos Nina MD  Interval History: awake alert    Diet: Diet NPO      Data:   Scheduled Meds:   sodium chloride flush  5-40 mL IntraVENous 2 times per day    ipratropium 0.5 mg-albuterol 2.5 mg  1 Dose Inhalation Q4H WA RT    heparin (porcine)  5,000 Units SubCUTAneous 3 times per day    midodrine  15 mg PEG Tube q8h    atorvastatin  40 mg PEG Tube Nightly    gabapentin  100 mg Per G Tube Daily    lactulose  20 g PEG Tube TID    rifAXIMin  550 mg PEG Tube BID    traZODone  50 mg Oral Nightly    sucralfate  1 g Oral 3 times per day     Continuous Infusions:   sodium chloride      sodium chloride      dextrose      norepinephrine Stopped (02/04/25 0431)     PRN Meds:sodium chloride, sodium chloride flush, sodium chloride, ondansetron **OR** ondansetron, polyethylene glycol, glucose, dextrose bolus **OR** dextrose bolus, glucagon (rDNA), dextrose, fentanNYL, LORazepam  I/O last 3 completed shifts:  In: 656.5 [P.O.:100; I.V.:14.2; IV Piggyback:42.3]  Out: 1595 [Emesis/NG output:1095]  No intake/output data recorded.    Intake/Output Summary (Last 24 hours) at 2/4/2025 0804  Last data filed at 
4 Eyes Skin Assessment     NAME:  Tico Day  YOB: 1960  MEDICAL RECORD NUMBER:  [id number]    The patient is being assessed for  Admission    I agree that at least one RN has performed a thorough Head to Toe Skin Assessment on the patient. ALL assessment sites listed below have been assessed.      Areas assessed by both nurses:    Head, Face, Ears, Shoulders, Back, Chest, Arms, Elbows, Hands, Sacrum. Buttock, Coccyx, Ischium, Legs. Feet and Heels, and Under Medical Devices         Does the Patient have a Wound? Yes wound(s) were present on assessment. LDA wound assessment was Initiated and completed by RN       Wilfrid Prevention initiated by RN: Yes  Wound Care Orders initiated by RN: Yes    Pressure Injury (Stage 3,4, Unstageable, DTI, NWPT, and Complex wounds) if present, place Wound referral order by RN under : Yes    New Ostomies, if present place, Ostomy referral order under : No     Nurse 1 eSignature: Electronically signed by Johanne Mehta RN on 2/3/25 at 3:10 AM EST    **SHARE this note so that the co-signing nurse can place an eSignature**    Nurse 2 eSignature: Electronically signed by Ilsa Causey RN on 2/3/25 at 6:01 AM EST    
Called to verify that morning type and screen had made it to blood bank at this time.   
Gave patient apple juice for a low bg, repeat came up to 88  
IR consult received, will plan on today.  
Nephrology Progress Note        [address] [facility], [address]  [place] [address]  Phone: [phone]  Office Hours: 8:30AM - 4:30PM  Monday - Friday 2/6/2025 7:21 AM  Subjective:   Admit Date: 2/2/2025  PCP: Carlos Nina MD  Interval History:   Alert and awake  Asking for food  Low BP    Diet: Diet NPO  ADULT TUBE FEEDING; PEG; Peptide Based; Continuous; 20; Yes; 10; Q 4 hours; 65; 30; Q 4 hours      Data:   Scheduled Meds:   ipratropium 0.5 mg-albuterol 2.5 mg  1 Dose Inhalation BID RT    sodium chloride flush  5-40 mL IntraVENous 2 times per day    heparin (porcine)  5,000 Units SubCUTAneous 3 times per day    midodrine  15 mg PEG Tube q8h    atorvastatin  40 mg PEG Tube Nightly    gabapentin  100 mg Per G Tube Daily    lactulose  20 g PEG Tube TID    rifAXIMin  550 mg PEG Tube BID    traZODone  50 mg Oral Nightly    sucralfate  1 g Oral 3 times per day     Continuous Infusions:   sodium chloride      sodium chloride      dextrose       PRN Meds:LORazepam, oxyCODONE, ipratropium 0.5 mg-albuterol 2.5 mg, sodium chloride, sodium chloride flush, sodium chloride, ondansetron **OR** ondansetron, polyethylene glycol, glucose, dextrose bolus **OR** dextrose bolus, glucagon (rDNA), dextrose  I/O last 3 completed shifts:  In: 550 [NG/GT:50]  Out: 1870 [Emesis/NG output:365]  No intake/output data recorded.    Intake/Output Summary (Last 24 hours) at 2/6/2025 0721  Last data filed at 2/5/2025 1220  Gross per 24 hour   Intake 500 ml   Output 1505 ml   Net -1005 ml       CBC:   Recent Labs     02/04/25  0400   WBC 6.3   HGB 7.5*          BMP:    Recent Labs     02/03/25  1640 02/04/25  0400 02/05/25  1220   * 134* 135*   K 4.7 4.4 3.4*   CL 95* 96* 97*   CO2 31 30 31   BUN 20 24* 13   CREATININE 1.8* 2.2* 1.4*   GLUCOSE 102* 89 73*   CALCIUM 8.7 8.9 8.5       Objective:   Vitals: BP (!) 89/57   Pulse 84   Temp 98.3 °F (36.8 °C) (Oral)   Resp 17   Ht 1.753 m (5' 9\")   Wt 91.8 kg (202 lb 
Nephrology Progress Note        [address] [facility], [address]  [place] [address]  Phone: [phone]  Office Hours: 8:30AM - 4:30PM  Monday - Friday 2/7/2025 7:01 AM  Subjective:   Admit Date: 2/2/2025  PCP: Carlos Nina MD  Interval History:   On trach collar  Low BP as usual    Diet: Diet NPO  ADULT TUBE FEEDING; PEG; Peptide Based; Continuous; 20; Yes; 10; Q 4 hours; 65; 30; Q 4 hours      Data:   Scheduled Meds:   epoetin gabriel-epbx  10,000 Units IntraVENous Once per day on Monday Wednesday Friday    ipratropium 0.5 mg-albuterol 2.5 mg  1 Dose Inhalation BID RT    sodium chloride flush  5-40 mL IntraVENous 2 times per day    heparin (porcine)  5,000 Units SubCUTAneous 3 times per day    midodrine  15 mg PEG Tube q8h    atorvastatin  40 mg PEG Tube Nightly    gabapentin  100 mg Per G Tube Daily    lactulose  20 g PEG Tube TID    rifAXIMin  550 mg PEG Tube BID    traZODone  50 mg Oral Nightly    sucralfate  1 g Oral 3 times per day     Continuous Infusions:   sodium chloride      sodium chloride      dextrose Stopped (02/07/25 0320)     PRN Meds:oxyCODONE, dextrose, ipratropium 0.5 mg-albuterol 2.5 mg, sodium chloride, sodium chloride flush, sodium chloride, ondansetron **OR** ondansetron, polyethylene glycol, glucose, dextrose bolus **OR** dextrose bolus, glucagon (rDNA), dextrose  I/O last 3 completed shifts:  In: 1090 [I.V.:1000; NG/GT:90]  Out: -   No intake/output data recorded.    Intake/Output Summary (Last 24 hours) at 2/7/2025 0701  Last data filed at 2/7/2025 0400  Gross per 24 hour   Intake 1090 ml   Output --   Net 1090 ml       CBC:   Recent Labs     02/06/25  1343 02/07/25  0600   WBC 5.5 6.1   HGB 9.1* 8.9*    179       BMP:    Recent Labs     02/05/25  1220 02/06/25  1343   * 133*   K 3.4* 4.1   CL 97* 95*   CO2 31 30   BUN 13 22*   CREATININE 1.4* 2.5*   GLUCOSE 73* 39*   CALCIUM 8.5 9.1           Objective:   Vitals: BP 93/65   Pulse 68   Temp 98.3 °F (36.8 °C) 
Patient Name: Tico Day  Patient : 1960  MRN: [id number]     Acct: [id number]  Date of Admission: 2025  Room/Bed: /A  Code Status:  Full Code  Allergies:   Allergies   Allergen Reactions    Reglan [Metoclopramide]     Remeron [Mirtazapine]      Diagnosis:    Patient Active Problem List   Diagnosis    Acute on chronic respiratory failure    Moderate malnutrition (HCC)    Pneumonia of both lower lobes due to Pseudomonas species (HCC)    Central line infection    Acute on chronic hypoxic respiratory failure    Decompensation of cirrhosis of liver (HCC)    Ascites of liver    Hyperkalemia         Treatment:  Hemodialysis 1:1  Priority: 1st Time Acute  Location: ICU    Diabetic: No  NPO: Yes  Isolation Precautions: Contact     Consent for Treatment Verified: Yes  Blood Consent Verified: Not Applicable     Safety Verified: Identify (I), Consent (C), Equipment (E), HepB Status (B), Orders Complete (O), Access Verified (A), and Timeliness (T)  Time out performed prior to access at 0800 hours.    Report Received from Primary RN at 0745 hours.  Primary RN (First Initial, Last Name, Title): SONDRA Figueroa RN  Incapacitated Nurse Education Completed: Yes     HBsAg ONLY:  Date Drawn: February 3, 2025       Results: Negative  HBsAb:  Date Drawn:  February 3, 2025       Results: Susceptible <10    Order  Dialysis Bath  K+ (Potassium): 2  Ca+ (Calcium): 2.5  Na+ (Sodium): 135  HCO3 (Bicarb): 34  Bicarbonate Concentrate Lot No.: [id number]  Acid Concentrate Lot No.: [id number]     Na+ Modeling: Not Applicable  Dialyzer: F180  Dialysate Temperature (C):  36  Blood Flow Rate (BFR):  250   Dialysate Flow Rate (DFR):   800        Access to be Utilized   Access: Tunneled Catheter  Location: Subclavian  Side: Left   Needle gauge:  Not Applicable  + Bruit/Thrill: Not Applicable    First Use X-ray Verified: Not Applicable  OK to use line order: Not Applicable    Site Assessment:  Signs and Symptoms of 
Phlebotomy stated they will be up momentarily to collect patients a.m. labs.   
Superior here to  patient, report given  
Switched patient trach from uncuffed to cuffed 7.5 ID per Dr Carcamo in order to ventilate patient adequately. Atraumatic procedure, new trach went in with no issues. Suction catheter passed, bilat brs heard. Peak airway pressure of 14. Inflated cuff to mov and placed patient on ventilator Spont PS 10 per Dr Carcamo.  
TRANSFER - OUT REPORT:    Verbal report given to Jacqueline heather Day  who remains in ICU for routine post-op       Report consisted of patient's Situation, Background, Assessment and   Recommendations(SBAR).     Information from the following report(s) Nurse Handoff Report was reviewed with the receiving nurse.    Opportunity for questions and clarification was provided.      Patient transported with:   Registered Nurse    
Infection/Inflammation: None  If yes: Not Applicable  Dressing: Dry and Intact  Site Prep: Medical Aseptic Technique  Dressing Changed this Treatment: No  If yes, by whom: NA - not changed today  Date of Last Dressing Change: 2/3/2025  Antimicrobial Patch in place?: Yes  Red Alcohol Caps in place?: Yes  Gauze Dressing?: No  Non Dialysis Use?: No  Comment:    Flows: Good, Patent  If access problem, who was notified:     Pre and Post-Assessment  Patient Vitals for the past 8 hrs:   Level of Consciousness Oriented X Heart Rhythm Respiratory Quality/Effort O2 Device Bilateral Breath Sounds Skin Color Skin Condition/Temp Abdomen Inspection Bowel Sounds (All Quadrants) Edema RUE Edema LUE Edema Pain Level   02/07/25 0400 0 -- -- Unlabored Trach mask -- -- -- Distended;Gross ascites;Rotund;Taut -- Left upper extremity None Non-pitting --   02/07/25 0745 0 4 Regular Unlabored T-Piece Diminished Dusky Dry;Flaky;Warm Distended Active None -- -- 0   02/07/25 0819 -- -- -- -- Trach mask -- -- -- -- -- -- -- -- --     Labs  Recent Labs     02/06/25  1343 02/07/25  0600   WBC 5.5 6.1   HGB 9.1* 8.9*   HCT 30.5* 29.7*    179                                                                  Recent Labs     02/05/25  1220 02/06/25  1343 02/07/25  0600   * 133* 129*   K 3.4* 4.1 4.2   CL 97* 95* 93*   CO2 31 30 30   BUN 13 22* 25*   CREATININE 1.4* 2.5* 2.7*   GLUCOSE 73* 39* 87   PHOS  --   --  3.4     IV Drips and Rate/Dose   sodium chloride      sodium chloride      dextrose Stopped (02/07/25 0320)      Safety - Before each treatment:   Dialysis Machine No.: [id number]   Machine Number: [id number]  Dialyzer Lot No.: [id number]  RO Machine Log Sheet Completed: Yes  Machine Alarm Self Test: Completed;Passed (02/07/25 0745)     Air Foam Detector: Proper Function, Tested, pH Reading  Extracorporeal Circuit Tested for Integrity: Yes  Machine Conductivity: 13.9  Manual Conductivity: 14     Bicarbonate Concentrate Lot No.: 
4:46 AM   Result Value Ref Range    POC Glucose 149 (H) 74 - 99 mg/dL   Culture, Blood 2    Collection Time: 02/03/25  5:15 AM    Specimen: Blood   Result Value Ref Range    Specimen Description .BLOOD     Special Requests          Culture NO GROWTH 1 HOUR    Culture, Blood 1    Collection Time: 02/03/25  5:35 AM    Specimen: Blood   Result Value Ref Range    Specimen Description .BLOOD     Special Requests          Culture NO GROWTH 1 HOUR    POCT Glucose    Collection Time: 02/03/25  5:39 AM   Result Value Ref Range    POC Glucose 162 (H) 74 - 99 mg/dL   Basic Metabolic Panel w/ Reflex to MG    Collection Time: 02/03/25  5:40 AM   Result Value Ref Range    Sodium 125 (L) 136 - 145 mmol/L    Potassium 6.0 (H) 3.5 - 5.1 mmol/L    Chloride 91 (L) 99 - 110 mmol/L    CO2 25 21 - 32 mmol/L    Anion Gap 9 9 - 17 mmol/L    Glucose 161 (H) 74 - 99 mg/dL    BUN 30 (H) 7 - 20 mg/dL    Creatinine 2.5 (H) 0.8 - 1.3 mg/dL    Est, Glom Filt Rate 26 (L) >60 mL/min/1.73m2    Calcium 8.9 8.3 - 10.6 mg/dL   Hep B Surf Ab    Collection Time: 02/03/25  7:47 AM   Result Value Ref Range    Hep B S Ab 3.50 mIU/mL   Hep B Surf Ag    Collection Time: 02/03/25  7:47 AM   Result Value Ref Range    Hepatitis B Surface Ag NONREACTIVE NONREACTIVE   Basic Metabolic Panel    Collection Time: 02/03/25  4:40 PM   Result Value Ref Range    Sodium 132 (L) 136 - 145 mmol/L    Potassium 4.7 3.5 - 5.1 mmol/L    Chloride 95 (L) 99 - 110 mmol/L    CO2 31 21 - 32 mmol/L    Anion Gap 7 (L) 9 - 17 mmol/L    Glucose 102 (H) 74 - 99 mg/dL    BUN 20 7 - 20 mg/dL    Creatinine 1.8 (H) 0.8 - 1.3 mg/dL    Est, Glom Filt Rate 39 (L) >60 mL/min/1.73m2    Calcium 8.7 8.3 - 10.6 mg/dL        Imaging/Diagnostics Last 24 Hours   XR CHEST PORTABLE    Result Date: 2/2/2025  XR CHEST PORTABLE 2/2/2025 22:33 History:  Shortness of breath COMPARISON: None Low lung volumes. Bibasilar opacities may represent atelectasis, aspiration or infection. Fullness of the pulmonary 
IMPRESSION: 1.  Low lung volumes. Bibasilar opacities may represent atelectasis, aspiration or infection. 2.  Fullness of the pulmonary vasculature. Mild cardiomegaly. Findings may be seen with pulmonary edema.  Dictated and Electronically Signed By: Carlos Cano 2/2/2025 22:32          Electronically signed by Edwin Yung MD on 2/5/2025 at 9:05 AM

## 2025-02-07 NOTE — CARE COORDINATION
CM reviewed pt's medical record. CM noted discharge order and arranged transportation for 3:30 PM. CM notified pt's wife of discharge order. CM sent PS to hospitalist requesting AFSHIN to be completed.    Pt returning/new to [facility] at discharge.     Please call report to [phone].    Fax AVS with completed AFSHIN and discharge summaries to [phone].     Place AVS with completed AFSHIN in discharge packet.

## 2025-02-07 NOTE — DISCHARGE SUMMARY
bedtime     sennosides 8.8 MG/5ML syrup  Commonly known as: SENOKOT     sucralfate 1 GM/10ML suspension  Commonly known as: CARAFATE     traZODone 50 MG tablet  Commonly known as: DESYREL             Objective Findings at Discharge:   BP (!) 92/50   Pulse 72   Temp 97.9 °F (36.6 °C)   Resp 17   Ht 1.753 m (5' 9\")   Wt 91.8 kg (202 lb 6.1 oz)   SpO2 100%   BMI 29.89 kg/m²       Physical Exam:   Physical Exam  Vitals and nursing note reviewed.   Constitutional:       General: He is not in acute distress.     Appearance: He is not ill-appearing.   HENT:      Head: Normocephalic and atraumatic.      Mouth/Throat:      Mouth: Mucous membranes are moist.   Eyes:      Extraocular Movements: Extraocular movements intact.      Pupils: Pupils are equal, round, and reactive to light.   Cardiovascular:      Rate and Rhythm: Normal rate and regular rhythm.      Pulses: Normal pulses.      Heart sounds: Normal heart sounds.   Pulmonary:      Effort: Pulmonary effort is normal.      Breath sounds: Normal breath sounds.   Abdominal:      General: There is distension.      Palpations: Abdomen is soft.   Musculoskeletal:         General: No tenderness or signs of injury. Normal range of motion.   Skin:     General: Skin is warm.      Capillary Refill: Capillary refill takes less than 2 seconds.   Neurological:      General: No focal deficit present.      Mental Status: He is alert and oriented to person, place, and time.   Psychiatric:         Mood and Affect: Mood normal.         Behavior: Behavior normal.         Thought Content: Thought content normal.         Judgment: Judgment normal.              Labs and Imaging   XR CHEST PORTABLE    Result Date: 2/2/2025  XR CHEST PORTABLE 2/2/2025 22:33 History:  Shortness of breath COMPARISON: None Low lung volumes. Bibasilar opacities may represent atelectasis, aspiration or infection. Fullness of the pulmonary vasculature. Mild cardiomegaly. Tracheostomy in place. Left dialysis

## 2025-02-07 NOTE — PLAN OF CARE
Problem: Chronic Conditions and Co-morbidities  Goal: Patient's chronic conditions and co-morbidity symptoms are monitored and maintained or improved  Outcome: Progressing     Problem: Discharge Planning  Goal: Discharge to home or other facility with appropriate resources  Outcome: Progressing     Problem: Pain  Goal: Verbalizes/displays adequate comfort level or baseline comfort level  Outcome: Progressing     Problem: Safety - Adult  Goal: Free from fall injury  Outcome: Progressing     Problem: Skin/Tissue Integrity  Goal: Skin integrity remains intact  Description: 1.  Monitor for areas of redness and/or skin breakdown  2.  Assess vascular access sites hourly  3.  Every 4-6 hours minimum:  Change oxygen saturation probe site  4.  Every 4-6 hours:  If on nasal continuous positive airway pressure, respiratory therapy assess nares and determine need for appliance change or resting period  Outcome: Progressing     Problem: Nutrition Deficit:  Goal: Optimize nutritional status  Outcome: Progressing

## 2025-02-08 LAB
ABO/RH: NORMAL
ANTIBODY SCREEN: NEGATIVE
BLOOD BANK COMMENT: NORMAL
BLOOD BANK DISPENSE STATUS: NORMAL
BLOOD BANK SAMPLE EXPIRATION: NORMAL
BPU ID: NORMAL
COMPONENT: NORMAL
CROSSMATCH RESULT: NORMAL
EKG ATRIAL RATE: 65 BPM
EKG DIAGNOSIS: NORMAL
EKG P AXIS: 25 DEGREES
EKG P-R INTERVAL: 176 MS
EKG Q-T INTERVAL: 470 MS
EKG QRS DURATION: 146 MS
EKG QTC CALCULATION (BAZETT): 488 MS
EKG R AXIS: -55 DEGREES
EKG T AXIS: 32 DEGREES
EKG VENTRICULAR RATE: 65 BPM
TRANSFUSION STATUS: NORMAL
UNIT DIVISION: 0

## 2025-02-09 LAB
MICROORGANISM SPEC CULT: NORMAL
MICROORGANISM SPEC CULT: NORMAL
SERVICE CMNT-IMP: NORMAL
SERVICE CMNT-IMP: NORMAL
SPECIMEN DESCRIPTION: NORMAL
SPECIMEN DESCRIPTION: NORMAL

## 2025-02-10 ENCOUNTER — HOSPITAL ENCOUNTER (EMERGENCY)
Age: 65
Discharge: NURSING, REHAB OR LONG-TERM CARE | End: 2025-02-10
Attending: STUDENT IN AN ORGANIZED HEALTH CARE EDUCATION/TRAINING PROGRAM
Payer: MEDICARE

## 2025-02-10 ENCOUNTER — APPOINTMENT (OUTPATIENT)
Dept: GENERAL RADIOLOGY | Age: 65
End: 2025-02-10
Attending: STUDENT IN AN ORGANIZED HEALTH CARE EDUCATION/TRAINING PROGRAM
Payer: MEDICARE

## 2025-02-10 VITALS
SYSTOLIC BLOOD PRESSURE: 98 MMHG | HEART RATE: 80 BPM | HEIGHT: 69 IN | TEMPERATURE: 97.7 F | DIASTOLIC BLOOD PRESSURE: 57 MMHG | OXYGEN SATURATION: 100 % | BODY MASS INDEX: 29.92 KG/M2 | RESPIRATION RATE: 17 BRPM | WEIGHT: 202 LBS

## 2025-02-10 DIAGNOSIS — J95.09 TRACHEOSTOMY OBSTRUCTION (HCC): ICD-10-CM

## 2025-02-10 DIAGNOSIS — J18.9 PNEUMONIA OF RIGHT LOWER LOBE DUE TO INFECTIOUS ORGANISM: ICD-10-CM

## 2025-02-10 DIAGNOSIS — J96.11 CHRONIC RESPIRATORY FAILURE WITH HYPOXIA: Primary | ICD-10-CM

## 2025-02-10 DIAGNOSIS — N18.4 STAGE 4 CHRONIC KIDNEY DISEASE (HCC): ICD-10-CM

## 2025-02-10 LAB
ANION GAP SERPL CALCULATED.3IONS-SCNC: 7 MMOL/L (ref 9–17)
BASOPHILS # BLD: 0.02 K/UL
BASOPHILS NFR BLD: 0 % (ref 0–1)
BUN SERPL-MCNC: 28 MG/DL (ref 7–20)
CALCIUM SERPL-MCNC: 9.5 MG/DL (ref 8.3–10.6)
CHLORIDE SERPL-SCNC: 89 MMOL/L (ref 99–110)
CO2 SERPL-SCNC: 32 MMOL/L (ref 21–32)
CREAT SERPL-MCNC: 2.8 MG/DL (ref 0.8–1.3)
EKG ATRIAL RATE: 80 BPM
EKG DIAGNOSIS: NORMAL
EKG P AXIS: 42 DEGREES
EKG P-R INTERVAL: 174 MS
EKG Q-T INTERVAL: 392 MS
EKG QRS DURATION: 142 MS
EKG QTC CALCULATION (BAZETT): 452 MS
EKG R AXIS: -66 DEGREES
EKG T AXIS: 58 DEGREES
EKG VENTRICULAR RATE: 80 BPM
EOSINOPHIL # BLD: 0.13 K/UL
EOSINOPHILS RELATIVE PERCENT: 2 % (ref 0–3)
ERYTHROCYTE [DISTWIDTH] IN BLOOD BY AUTOMATED COUNT: 15.1 % (ref 11.7–14.9)
GFR, ESTIMATED: 23 ML/MIN/1.73M2
GLUCOSE SERPL-MCNC: 119 MG/DL (ref 74–99)
HCT VFR BLD AUTO: 32.3 % (ref 42–52)
HGB BLD-MCNC: 9.5 G/DL (ref 13.5–18)
IMM GRANULOCYTES # BLD AUTO: 0.03 K/UL
IMM GRANULOCYTES NFR BLD: 1 %
INFLUENZA A BY PCR: NOT DETECTED
INFLUENZA B BY PCR: NOT DETECTED
LYMPHOCYTES NFR BLD: 0.53 K/UL
LYMPHOCYTES RELATIVE PERCENT: 9 % (ref 24–44)
MCH RBC QN AUTO: 28.4 PG (ref 27–31)
MCHC RBC AUTO-ENTMCNC: 29.4 G/DL (ref 32–36)
MCV RBC AUTO: 96.7 FL (ref 78–100)
MONOCYTES NFR BLD: 0.32 K/UL
MONOCYTES NFR BLD: 5 % (ref 0–4)
NEUTROPHILS NFR BLD: 83 % (ref 36–66)
NEUTS SEG NFR BLD: 5.15 K/UL
PLATELET # BLD AUTO: 142 K/UL (ref 140–440)
PMV BLD AUTO: 9.9 FL (ref 7.5–11.1)
POTASSIUM SERPL-SCNC: 5.2 MMOL/L (ref 3.5–5.1)
RBC # BLD AUTO: 3.34 M/UL (ref 4.6–6.2)
SARS-COV-2 RDRP RESP QL NAA+PROBE: NOT DETECTED
SODIUM SERPL-SCNC: 127 MMOL/L (ref 136–145)
SPECIMEN DESCRIPTION: NORMAL
TROPONIN I SERPL HS-MCNC: 194 NG/L (ref 0–22)
WBC OTHER # BLD: 6.2 K/UL (ref 4–10.5)

## 2025-02-10 PROCEDURE — 87502 INFLUENZA DNA AMP PROBE: CPT

## 2025-02-10 PROCEDURE — 80048 BASIC METABOLIC PNL TOTAL CA: CPT

## 2025-02-10 PROCEDURE — 2700000000 HC OXYGEN THERAPY PER DAY

## 2025-02-10 PROCEDURE — 99285 EMERGENCY DEPT VISIT HI MDM: CPT

## 2025-02-10 PROCEDURE — 93005 ELECTROCARDIOGRAM TRACING: CPT | Performed by: STUDENT IN AN ORGANIZED HEALTH CARE EDUCATION/TRAINING PROGRAM

## 2025-02-10 PROCEDURE — 71045 X-RAY EXAM CHEST 1 VIEW: CPT

## 2025-02-10 PROCEDURE — 85025 COMPLETE CBC W/AUTO DIFF WBC: CPT

## 2025-02-10 PROCEDURE — 84484 ASSAY OF TROPONIN QUANT: CPT

## 2025-02-10 PROCEDURE — 93010 ELECTROCARDIOGRAM REPORT: CPT | Performed by: INTERNAL MEDICINE

## 2025-02-10 PROCEDURE — 87635 SARS-COV-2 COVID-19 AMP PRB: CPT

## 2025-02-10 PROCEDURE — 6370000000 HC RX 637 (ALT 250 FOR IP): Performed by: STUDENT IN AN ORGANIZED HEALTH CARE EDUCATION/TRAINING PROGRAM

## 2025-02-10 RX ORDER — GABAPENTIN 100 MG/1
100 CAPSULE ORAL ONCE
Status: COMPLETED | OUTPATIENT
Start: 2025-02-10 | End: 2025-02-10

## 2025-02-10 RX ORDER — DOXYCYCLINE HYCLATE 100 MG
100 TABLET ORAL ONCE
Status: COMPLETED | OUTPATIENT
Start: 2025-02-10 | End: 2025-02-10

## 2025-02-10 RX ORDER — DOXYCYCLINE HYCLATE 100 MG
100 TABLET ORAL 2 TIMES DAILY
Qty: 14 TABLET | Refills: 0 | Status: SHIPPED | OUTPATIENT
Start: 2025-02-10 | End: 2025-02-17

## 2025-02-10 RX ORDER — MIDODRINE HYDROCHLORIDE 5 MG/1
5 TABLET ORAL ONCE
Status: COMPLETED | OUTPATIENT
Start: 2025-02-10 | End: 2025-02-10

## 2025-02-10 RX ADMIN — GABAPENTIN 100 MG: 100 CAPSULE ORAL at 07:09

## 2025-02-10 RX ADMIN — AMOXICILLIN AND CLAVULANATE POTASSIUM 1 TABLET: 875; 125 TABLET, FILM COATED ORAL at 07:09

## 2025-02-10 RX ADMIN — DOXYCYCLINE HYCLATE 100 MG: 100 TABLET, COATED ORAL at 07:10

## 2025-02-10 RX ADMIN — MIDODRINE HYDROCHLORIDE 5 MG: 5 TABLET ORAL at 08:36

## 2025-02-10 ASSESSMENT — PAIN - FUNCTIONAL ASSESSMENT
PAIN_FUNCTIONAL_ASSESSMENT: NONE - DENIES PAIN
PAIN_FUNCTIONAL_ASSESSMENT: NONE - DENIES PAIN

## 2025-02-10 NOTE — ED NOTES
This RN received report from Nelson SEE. The patient is resting in bed, is not in acute distress, is fully on the monitor, has a call light within reach, has been updated on his plan of care, and denies any needs.

## 2025-02-10 NOTE — ED NOTES
Report called adria Maldonado RN at [facility] at this time. Notified of ETA of 0820.

## 2025-02-10 NOTE — ED PROVIDER NOTES
6.20 m/uL    Hemoglobin 9.5 (L) 13.5 - 18.0 g/dL    Hematocrit 32.3 (L) 42.0 - 52.0 %    MCV 96.7 78.0 - 100.0 fL    MCH 28.4 27.0 - 31.0 pg    MCHC 29.4 (L) 32.0 - 36.0 g/dL    RDW 15.1 (H) 11.7 - 14.9 %    Platelets 142 140 - 440 k/uL    MPV 9.9 7.5 - 11.1 fL    Neutrophils % 83 (H) 36 - 66 %    Lymphocytes % 9 (L) 24 - 44 %    Monocytes % 5 (H) 0 - 4 %    Eosinophils % 2 0.0 - 3.0 %    Basophils % 0 0 - 1 %    Immature Granulocytes % 1 (H) 0 %    Neutrophils Absolute 5.15 k/uL    Lymphocytes Absolute 0.53 k/uL    Monocytes Absolute 0.32 k/uL    Eosinophils Absolute 0.13 k/uL    Basophils Absolute 0.02 k/uL    Immature Granulocytes Absolute 0.03 k/uL   BMP   Result Value Ref Range    Sodium 127 (L) 136 - 145 mmol/L    Potassium 5.2 (H) 3.5 - 5.1 mmol/L    Chloride 89 (L) 99 - 110 mmol/L    CO2 32 21 - 32 mmol/L    Anion Gap 7 (L) 9 - 17 mmol/L    Glucose 119 (H) 74 - 99 mg/dL    BUN 28 (H) 7 - 20 mg/dL    Creatinine 2.8 (H) 0.8 - 1.3 mg/dL    Est, Glom Filt Rate 23 (L) >60 mL/min/1.73m2    Calcium 9.5 8.3 - 10.6 mg/dL   Troponin Now and Q1Hr   Result Value Ref Range    Troponin, High Sensitivity 194 (HH) 0 - 22 ng/L   EKG 12 Lead (SOB)   Result Value Ref Range    Ventricular Rate 80 BPM    Atrial Rate 80 BPM    P-R Interval 174 ms    QRS Duration 142 ms    Q-T Interval 392 ms    QTc Calculation (Bazett) 452 ms    P Axis 42 degrees    R Axis -66 degrees    T Axis 58 degrees    Diagnosis       Normal sinus rhythm  Right bundle branch block  Left anterior fascicular block   Bifascicular block   Lateral infarct (cited on or before 25-OCT-2024)  Abnormal ECG  When compared with ECG of 07-FEB-2025 14:28,  Questionable change in initial forces of Lateral leads        Radiographs (if obtained):  Radiologist's Report Reviewed:  XR CHEST PORTABLE    Result Date: 2/10/2025  PROCEDURE: XR CHEST PORTABLE DATE OF EXAM:  2/10/2025 5:46 DEMOGRAPHICS: 64 years old Male INDICATION: SOB COMPARISON: February 2, 2025 FINDINGS:

## 2025-02-12 NOTE — PROGRESS NOTES
Pt tolerated tx well. 1500 UF removed.  Dressing changed. Large amount of pink/yellow drainage noted. Fresh and dried drainage. Insertion site is pink and tender while cleaning. Area cleaned well and new dressing applied.     Patient Name: Tico Day  Patient : 1960  MRN: [id number]     Acct: [id number]  Date of Admission: 10/25/2024  Room/Bed: [facility]/  Code Status:  Full Code  Allergies:   Allergies   Allergen Reactions    Reglan [Metoclopramide]     Remeron [Mirtazapine]      Diagnosis:    Patient Active Problem List   Diagnosis    Acute on chronic respiratory failure    Moderate malnutrition (HCC)         Treatment:  Hemodialysis 1:1  Priority: Routine  Location: ICU    Diabetic: No  NPO: Yes  Isolation Precautions: Contact     Consent for Treatment Verified: Yes  Blood Consent Verified: Not Applicable     Safety Verified: Identify (I), Consent (C), Equipment (E), HepB Status (B), Orders Complete (O), Access Verified (A), and Timeliness (T)  Time out performed prior to access at 1352 hours.    Report Received from Primary RN at 1330 hours.  Primary RN (First Initial, Last Name, Title): JOHAN lauren  Incapacitated Nurse Education Completed: Yes     HBsAg ONLY:  Date Drawn: 2024       Results: Negative  HBsAb:  Date Drawn:  2024       Results: Susceptible <10    Order  Dialysis Bath  K+ (Potassium): 3  Ca+ (Calcium):  (3.5)  Na+ (Sodium): 137  HCO3 (Bicarb): 34  Bicarbonate Concentrate Lot No.: [id number]  Acid Concentrate Lot No.: [id number]     Na+ Modeling: Not Applicable  Dialyzer: F180  Dialysate Temperature (C):  35  Blood Flow Rate (BFR):  300   Dialysate Flow Rate (DFR):   700        Access to be Utilized   Access: Tunneled Catheter  Location: Subclavian  Side: Right     Site Assessment:  Signs and Symptoms of Infection/Inflammation: None  If yes: Not Applicable  Dressing: Dry and Intact  Site Prep: Medical Aseptic Technique  Dressing Changed this Treatment: Yes  If yes,  No       Provider Notification        Handoff complete and report given to Primary RN at 1715 hours.  Primary RN (First Initial, Last Name, Title):  K Branch     Education  Person Educated: Patient   Knowledge Base: Substantial  Barriers to Learning?: None  Preferred method of Learning: Oral  Topic(s): Signs and Symptoms of Infection and Procedural   Teaching Tools: Explanation   Response to Education: Verbalized Understanding     Electronically signed by Clara Fishman on 10/28/2024 at 5:28 PM   VTE Assessment already completed for this visit